# Patient Record
Sex: FEMALE | Race: WHITE | NOT HISPANIC OR LATINO | Employment: FULL TIME | URBAN - METROPOLITAN AREA
[De-identification: names, ages, dates, MRNs, and addresses within clinical notes are randomized per-mention and may not be internally consistent; named-entity substitution may affect disease eponyms.]

---

## 2017-08-02 ENCOUNTER — ALLSCRIPTS OFFICE VISIT (OUTPATIENT)
Dept: OTHER | Facility: OTHER | Age: 32
End: 2017-08-02

## 2017-08-02 DIAGNOSIS — S93.402A SPRAIN OF LIGAMENT OF LEFT ANKLE: ICD-10-CM

## 2018-01-13 VITALS
DIASTOLIC BLOOD PRESSURE: 76 MMHG | WEIGHT: 269.8 LBS | SYSTOLIC BLOOD PRESSURE: 118 MMHG | HEART RATE: 82 BPM | HEIGHT: 63 IN | BODY MASS INDEX: 47.8 KG/M2

## 2018-07-02 ENCOUNTER — OFFICE VISIT (OUTPATIENT)
Dept: FAMILY MEDICINE CLINIC | Facility: CLINIC | Age: 33
End: 2018-07-02

## 2018-07-02 VITALS
BODY MASS INDEX: 45.53 KG/M2 | WEIGHT: 257 LBS | DIASTOLIC BLOOD PRESSURE: 78 MMHG | HEART RATE: 68 BPM | TEMPERATURE: 98.9 F | SYSTOLIC BLOOD PRESSURE: 132 MMHG

## 2018-07-02 DIAGNOSIS — J06.9 UPPER RESPIRATORY TRACT INFECTION, UNSPECIFIED TYPE: Primary | ICD-10-CM

## 2018-07-02 DIAGNOSIS — J45.41 MODERATE PERSISTENT REACTIVE AIRWAY DISEASE WITH ACUTE EXACERBATION: ICD-10-CM

## 2018-07-02 PROBLEM — J45.901 REACTIVE AIRWAY DISEASE WITH ACUTE EXACERBATION: Status: ACTIVE | Noted: 2018-07-02

## 2018-07-02 PROBLEM — E66.01 MORBID OBESITY (HCC): Status: ACTIVE | Noted: 2017-08-02

## 2018-07-02 PROCEDURE — 94640 AIRWAY INHALATION TREATMENT: CPT | Performed by: PHYSICIAN ASSISTANT

## 2018-07-02 PROCEDURE — 99212 OFFICE O/P EST SF 10 MIN: CPT | Performed by: PHYSICIAN ASSISTANT

## 2018-07-02 RX ORDER — PREDNISONE 10 MG/1
TABLET ORAL
Qty: 25 TABLET | Refills: 0 | Status: SHIPPED | OUTPATIENT
Start: 2018-07-02 | End: 2020-03-03 | Stop reason: ALTCHOICE

## 2018-07-02 RX ORDER — AZITHROMYCIN 250 MG/1
TABLET, FILM COATED ORAL
Qty: 6 TABLET | Refills: 0 | Status: SHIPPED | OUTPATIENT
Start: 2018-07-02 | End: 2018-07-06

## 2018-07-02 RX ORDER — ALBUTEROL SULFATE 2.5 MG/3ML
2.5 SOLUTION RESPIRATORY (INHALATION) ONCE
Status: COMPLETED | OUTPATIENT
Start: 2018-07-02 | End: 2018-07-02

## 2018-07-02 RX ORDER — BENZONATATE 200 MG/1
200 CAPSULE ORAL 3 TIMES DAILY PRN
Qty: 30 CAPSULE | Refills: 0 | Status: SHIPPED | OUTPATIENT
Start: 2018-07-02 | End: 2018-07-12

## 2018-07-02 RX ADMIN — ALBUTEROL SULFATE 2.5 MG: 2.5 SOLUTION RESPIRATORY (INHALATION) at 10:11

## 2018-07-02 NOTE — PATIENT INSTRUCTIONS
Problem List Items Addressed This Visit        Respiratory    Upper respiratory infection - Primary     Antibiotic as directed  Suggest Mucinex DM generic as directed  Relevant Medications    azithromycin (ZITHROMAX) 250 mg tablet    benzonatate (TESSALON) 200 MG capsule    Other Relevant Orders    Mini neb    Reactive airway disease with acute exacerbation     Prednisone taper as directed  Nebulizer given the office today           Relevant Medications    predniSONE 10 mg tablet    albuterol inhalation solution 2 5 mg (Start on 7/2/2018 10:15 AM)    Other Relevant Orders    Mini neb

## 2018-07-02 NOTE — PROGRESS NOTES
Assessment/Plan:    Reactive airway disease with acute exacerbation  Prednisone taper as directed  Nebulizer given the office today  Upper respiratory infection  Antibiotic as directed  Suggest Mucinex DM generic as directed  Diagnoses and all orders for this visit:    Upper respiratory tract infection, unspecified type  -     Mini neb  -     azithromycin (ZITHROMAX) 250 mg tablet; Take two tablets on day one and then one tablet daily for the next four days  -     benzonatate (TESSALON) 200 MG capsule; Take 1 capsule (200 mg total) by mouth 3 (three) times a day as needed for cough for up to 10 days    Moderate persistent reactive airway disease with acute exacerbation  -     Mini neb  -     predniSONE 10 mg tablet; Day 1= 50 mg at once, date 2+3= 40 mg once daily, day 4+5= 30 mg once daily, day 6+7= 20 mg once daily, and day 8+9= 10 mg once daily  -     albuterol inhalation solution 2 5 mg; Take 3 mL (2 5 mg total) by nebulization once           Subjective:   CC: c/o cough and wheezing, pt  States 2132 wks ago she started with cold sx which have resolved but the cough remains  [facility]   Patient ID: Ami Cortez is a 28 y o  female  Patient states that over 1 week ago she developed head in throat congestion sinus pressure runny nose ear discomfort postnasal drip and that turned in to coughing with chest involvement  She states that she is not able to work secondary to incessant coughing despite using over-the-counter cough medications without relief  No one else that she knows is sick  No nausea vomiting or diarrhea she has no history of asthma  The following portions of the patient's history were reviewed and updated as appropriate: allergies, current medications, past family history, past medical history, past social history, past surgical history and problem list     Review of Systems   Respiratory: Positive for cough and wheezing               Mini neb     Date/Time 7/2/2018 10:06 AM Performed by  Mary Carmen Tran by Mata Jimenez       Consent: Verbal consent obtained  Risks and benefits: risks, benefits and alternatives were discussed     Procedure Details   Procedure Notes: Nebulizer used to deliver episode ordered medication/s  Patient did well, and was clinically reviewed after the nebulizer treatment was completed  Patient tolerance: Patient tolerated the procedure well with no immediate complications             Objective:      Vitals:    07/02/18 0949   BP: 132/78   BP Location: Left arm   Patient Position: Sitting   Pulse: 68   Temp: 98 9 °F (37 2 °C)   TempSrc: Tympanic   Weight: 117 kg (257 lb)            Physical Exam   Constitutional: She is oriented to person, place, and time  She appears well-developed and well-nourished  No distress  HENT:   Head: Normocephalic and atraumatic  Right Ear: External ear normal    Left Ear: External ear normal    Nose: Nose normal    Mouth/Throat: Oropharynx is clear and moist  No oropharyngeal exudate  Eyes: Conjunctivae are normal  Right eye exhibits no discharge  Left eye exhibits no discharge  Neck: Neck supple  Carotid bruit is not present  Cardiovascular: Normal rate, regular rhythm and normal heart sounds  Exam reveals no gallop and no friction rub  No murmur heard  Pulmonary/Chest: Effort normal and breath sounds normal  No respiratory distress  She has no wheezes  She has no rales  Difficult to assess breathing due to patient's incessant nonproductive cough  Breathing in word without inspiratory wheezes  Patient given nebulizer  S/p neb outward breath sounds rough bu no hilario wheezing  Neurological: She is alert and oriented to person, place, and time  Skin: Skin is warm and dry  She is not diaphoretic  Psychiatric: She has a normal mood and affect  Judgment normal    Nursing note and vitals reviewed

## 2018-07-02 NOTE — LETTER
July 2, 2018     Patient: Pedro Robbins   YOB: 1985   Date of Visit: 7/2/2018       To Whom it May Concern:    Pedro Robbins is under my professional care  She was seen in my office on 7/2/2018  She may return to work on July 5th, acute upper respiratory infection with reactive airway       If you have any questions or concerns, please don't hesitate to call           Sincerely,          Pat Antonio PA-C        CC: No Recipients

## 2018-07-05 ENCOUNTER — TELEPHONE (OUTPATIENT)
Dept: FAMILY MEDICINE CLINIC | Facility: CLINIC | Age: 33
End: 2018-07-05

## 2018-07-05 NOTE — TELEPHONE ENCOUNTER
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   PLEASE ADVISE

## 2020-03-03 ENCOUNTER — OFFICE VISIT (OUTPATIENT)
Dept: FAMILY MEDICINE CLINIC | Facility: CLINIC | Age: 35
End: 2020-03-03

## 2020-03-03 VITALS
HEIGHT: 63 IN | BODY MASS INDEX: 47.8 KG/M2 | DIASTOLIC BLOOD PRESSURE: 72 MMHG | HEART RATE: 88 BPM | SYSTOLIC BLOOD PRESSURE: 114 MMHG | WEIGHT: 269.8 LBS

## 2020-03-03 DIAGNOSIS — S09.90XA INJURY OF HEAD, INITIAL ENCOUNTER: Primary | ICD-10-CM

## 2020-03-03 PROCEDURE — 1036F TOBACCO NON-USER: CPT | Performed by: FAMILY MEDICINE

## 2020-03-03 PROCEDURE — 99213 OFFICE O/P EST LOW 20 MIN: CPT | Performed by: FAMILY MEDICINE

## 2020-03-03 PROCEDURE — 3008F BODY MASS INDEX DOCD: CPT | Performed by: FAMILY MEDICINE

## 2020-03-03 NOTE — ASSESSMENT & PLAN NOTE
Minor head injury with some photophobia, i e  Bright lights bother her  Consider follow-up with Neurology, therapy  For the time being, try to limit screen, i e  Turned down the brightness  Try to be in a dark room as far as vision with fluorescent lighting  If something bothers her more as she is working, she should stop  Should be reasonable for her to return to work at this point  If her workplace is not able to accommodate lighting changes, or decreasing the brightness on her screen, and alternate would be darker glasses

## 2020-03-03 NOTE — PATIENT INSTRUCTIONS
Problem List Items Addressed This Visit     Head injury - Primary     Minor head injury with some photophobia, i e  Bright lights bother her  Consider follow-up with Neurology, therapy  For the time being, try to limit screen, i e  Turned down the brightness  Try to be in a dark room as far as vision with fluorescent lighting  If something bothers her more as she is working, she should stop  Should be reasonable for her to return to work at this point  If her workplace is not able to accommodate lighting changes, or decreasing the brightness on her screen, and alternate would be darker glasses

## 2020-03-03 NOTE — LETTER
March 3, 2020     Patient: Nichole Her   YOB: 1985   Date of Visit: 3/3/2020       To Whom it May Concern:    Nichole Her is under my professional care  She was seen in my office on 3/3/2020  She may return to work with limitations 4 March 2020  Limitations include decreasing brightness ever screen, and decreasing brightness of lighting in her office area  If that cannot be accomplished, and alternate of allowing her to wear dark glasses would be reasonable       If you have any questions or concerns, please don't hesitate to call           Sincerely,          Teddy Fisher MD        CC: No Recipients

## 2020-03-03 NOTE — PROGRESS NOTES
Assessment and Plan:    Problem List Items Addressed This Visit     Head injury - Primary     Minor head injury with some photophobia, i e  Bright lights bother her  Consider follow-up with Neurology, therapy  For the time being, try to limit screen, i e  Turned down the brightness  Try to be in a dark room as far as vision with fluorescent lighting  If something bothers her more as she is working, she should stop  Should be reasonable for her to return to work at this point  If her workplace is not able to accommodate lighting changes, or decreasing the brightness on her screen, and alternate would be darker glasses  Diagnoses and all orders for this visit:    Injury of head, initial encounter              Subjective:      Patient ID: Kristin Faulkner is a 38 y o  female  CC:    Chief Complaint   Patient presents with    Back Pain     Pt states she hit her head on Saturday getting out of the car and since she has had headaches, slight dizziness and low back pain from the hit  kw    Headache    Dizziness       HPI:    Please see chief complaint  Patient was exiting a vehicle, and unfortunately hit her head  Patient currently has some irritation across the frontal aspect of the head, vision changes including light sensitivity, up and some low back pain  Some pain initially on the right side of the C-spine  No LOC  Remembers the entire event  No other neurologic loss at the moment  Used ibuprofen, heating pad  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, which means that she is working on computer in office with [employer]  The following portions of the patient's history were reviewed and updated as appropriate: allergies, current medications and problem list       Review of Systems   Constitutional: Negative  HENT: Negative  Eyes: Positive for photophobia and visual disturbance  Respiratory: Negative  Cardiovascular: Negative  Gastrointestinal: Negative  Endocrine: Negative  Genitourinary: Negative  Musculoskeletal: Negative  Skin: Negative  Allergic/Immunologic: Negative  Neurological:        Some changes in vision, i e  Light sensitivity, some problems tracking  Hematological: Negative  Psychiatric/Behavioral: Negative  Data to review:       Objective:    Vitals:    03/03/20 1455   BP: 114/72   BP Location: Left arm   Patient Position: Sitting   Pulse: 88   Weight: 122 kg (269 lb 12 8 oz)   Height: 5' 3" (1 6 m)        Physical Exam   Constitutional: She is oriented to person, place, and time  She appears well-developed and well-nourished  HENT:   Head: Normocephalic and atraumatic  Cardiovascular: Normal rate, regular rhythm and normal heart sounds  Pulses:       Carotid pulses are 2+ on the right side, and 2+ on the left side  Pulmonary/Chest: Effort normal and breath sounds normal  She has no wheezes  She has no rales  She exhibits no tenderness  Neurological: She is alert and oriented to person, place, and time  She displays normal reflexes  No cranial nerve deficit or sensory deficit  She exhibits normal muscle tone  Coordination normal    Some slight difficulty with tracking ocular motion, but otherwise normal    Nursing note and vitals reviewed  BMI Counseling: Body mass index is 47 79 kg/m²  The BMI is above normal  Nutrition recommendations include decreasing portion sizes, encouraging healthy choices of fruits and vegetables, decreasing fast food intake, consuming healthier snacks, limiting drinks that contain sugar, moderation in carbohydrate intake, increasing intake of lean protein, reducing intake of saturated and trans fat and reducing intake of cholesterol  Exercise recommendations include exercising 3-5 times per week  No pharmacotherapy was ordered

## 2020-03-04 ENCOUNTER — APPOINTMENT (EMERGENCY)
Dept: CT IMAGING | Facility: HOSPITAL | Age: 35
End: 2020-03-04
Payer: COMMERCIAL

## 2020-03-04 ENCOUNTER — HOSPITAL ENCOUNTER (EMERGENCY)
Facility: HOSPITAL | Age: 35
Discharge: HOME | End: 2020-03-04
Attending: EMERGENCY MEDICINE | Admitting: EMERGENCY MEDICINE
Payer: COMMERCIAL

## 2020-03-04 VITALS
RESPIRATION RATE: 16 BRPM | HEART RATE: 80 BPM | SYSTOLIC BLOOD PRESSURE: 124 MMHG | OXYGEN SATURATION: 99 % | WEIGHT: 271.61 LBS | TEMPERATURE: 97.8 F | DIASTOLIC BLOOD PRESSURE: 60 MMHG | BODY MASS INDEX: 48.11 KG/M2

## 2020-03-04 DIAGNOSIS — S06.0X9A CONCUSSION: Primary | ICD-10-CM

## 2020-03-04 PROCEDURE — NC001 PR NO CHARGE: Performed by: EMERGENCY MEDICINE

## 2020-03-04 PROCEDURE — 99284 EMERGENCY DEPT VISIT MOD MDM: CPT

## 2020-03-04 PROCEDURE — 99284 EMERGENCY DEPT VISIT MOD MDM: CPT | Performed by: EMERGENCY MEDICINE

## 2020-03-04 PROCEDURE — 70450 CT HEAD/BRAIN W/O DYE: CPT

## 2020-03-04 NOTE — ED PROVIDER NOTES
History  Chief Complaint   Patient presents with    Dizziness     pt states  on saturday when she tried to get out of vehicle hit head on door  pt states she saw pcp for clearance for work yesterday needed ct scan but unable to get  pt states this am pt states she woke up dizzy and weak  attempted to see pcp but fell did not hit head  c/o pressure to head       35y  o female with no significant PMH presents to the ER for evaluation  Patient states she hit her head on her car on Saturday  She was seen by her pcp and was cleared to return to work  Today, while getting out of the car, she became dizzy and fell over  She denies hitting her head again or LOC  She did hit her right knee but states it does not hurt  This fall occurred around 09:55 today  She denies starting or stopping any medications  She describes her dizziness as room spinning that comes and goes with quick movements  Associated symptoms: photophobia  She denies fever, chills, headache, vision changes, chest pain, dyspnea, N/V/D, abdominal pain, weakness or paresthesias  History provided by:  Patient   used: No        None       History reviewed  No pertinent past medical history  Past Surgical History:   Procedure Laterality Date    CHOLECYSTECTOMY         History reviewed  No pertinent family history  I have reviewed and agree with the history as documented  E-Cigarette/Vaping    E-Cigarette Use Never User      E-Cigarette/Vaping Substances     Social History     Tobacco Use    Smoking status: Never Smoker    Smokeless tobacco: Never Used   Substance Use Topics    Alcohol use: Never     Frequency: Never    Drug use: Never       Review of Systems   Constitutional: Negative for chills and fever  HENT: Negative for facial swelling  Eyes: Positive for photophobia  Negative for visual disturbance  Respiratory: Negative for shortness of breath  Cardiovascular: Negative for chest pain     Gastrointestinal: Negative for abdominal pain, diarrhea, nausea and vomiting  Musculoskeletal: Negative for back pain, neck pain and neck stiffness  Skin: Negative for rash  Allergic/Immunologic: Negative for food allergies  Neurological: Positive for dizziness  Negative for weakness, numbness and headaches  Physical Exam  Physical Exam   Constitutional:  Non-toxic appearance  No distress  HENT:   Head: Normocephalic and atraumatic  Right Ear: Tympanic membrane, external ear and ear canal normal  No drainage, swelling or tenderness  No foreign bodies  Tympanic membrane is not erythematous  No hemotympanum  Left Ear: Tympanic membrane, external ear and ear canal normal  No drainage, swelling or tenderness  No foreign bodies  Tympanic membrane is not erythematous  No hemotympanum  Nose: Nose normal    Mouth/Throat: Uvula is midline, oropharynx is clear and moist and mucous membranes are normal  No uvula swelling  No posterior oropharyngeal edema, posterior oropharyngeal erythema or tonsillar abscesses  No tonsillar exudate  Eyes: Pupils are equal, round, and reactive to light  Conjunctivae and EOM are normal    Neck: Normal range of motion and phonation normal  Neck supple  No tracheal deviation present  Cardiovascular: Normal rate, regular rhythm, S1 normal, S2 normal and normal heart sounds  Exam reveals no gallop and no friction rub  No murmur heard  Pulmonary/Chest: Effort normal and breath sounds normal  No respiratory distress  She has no decreased breath sounds  She has no wheezes  She has no rhonchi  She has no rales  She exhibits no tenderness  Abdominal: Soft  Bowel sounds are normal  She exhibits no distension  There is no tenderness  There is no rebound and no guarding  Musculoskeletal: Normal range of motion  She exhibits no edema or deformity          Right knee: Normal         Left knee: Normal         Cervical back: Normal         Thoracic back: Normal         Lumbar back: Normal  Neurological: She is alert  She has normal strength  No cranial nerve deficit or sensory deficit  She exhibits normal muscle tone  Gait normal  GCS eye subscore is 4  GCS verbal subscore is 5  GCS motor subscore is 6  Normal finger to nose  No facial asymmetry  Normal NORA  Normal gait  Skin: Skin is warm and dry  No rash noted  Psychiatric: She has a normal mood and affect  Nursing note and vitals reviewed  Vital Signs  ED Triage Vitals [03/04/20 1339]   Temperature Pulse Respirations Blood Pressure SpO2   97 8 °F (36 6 °C) 87 14 127/58 98 %      Temp Source Heart Rate Source Patient Position - Orthostatic VS BP Location FiO2 (%)   Temporal Monitor Sitting Right arm --      Pain Score       4           Vitals:    03/04/20 1339 03/04/20 1540   BP: 127/58 124/60   Pulse: 87 80   Patient Position - Orthostatic VS: Sitting          Visual Acuity  Visual Acuity      Most Recent Value   L Pupil Size (mm)  3   R Pupil Size (mm)  3          ED Medications  Medications - No data to display    Diagnostic Studies  Results Reviewed     None                 CT head without contrast   Final Result by Catia Riggins MD (03/04 1635)      No acute intracranial abnormality  Workstation performed: [id number]                    Procedures  Procedures         ED Course                               MDM  Number of Diagnoses or Management Options  Concussion: new and requires workup  Diagnosis management comments: DDX consists of but not limited to: concussion, intracranial abnormality    Will check pregnancy and CT head  Patient signed out to [facility] awaiting [facility]  Patient stable         Amount and/or Complexity of Data Reviewed  Clinical lab tests: ordered and reviewed  Tests in the radiology section of CPT®: ordered and reviewed  Discuss the patient with other providers: yes    Patient Progress  Patient progress: stable        Disposition  Final diagnoses:   Concussion     Time reflects when diagnosis was documented in both MDM as applicable and the Disposition within this note     Time User Action Codes Description Comment    3/4/2020  4:49 PM Rosalba Sethi Add [S06 0X9A] Concussion       ED Disposition     ED Disposition Condition Date/Time Comment    Discharge Stable Wed Mar 4, 2020  4:49 PM Marybeth Big discharge to home/self care  Follow-up Information     Follow up With Specialties Details Why [address]  Schedule an appointment as soon as possible for a visit   [place]  [phone]          There are no discharge medications for this patient  No discharge procedures on file      PDMP Review     None          ED Provider  Electronically Signed by           Kay Smith PA-C  03/05/20 1636

## 2020-03-04 NOTE — ED NOTES
Pt finished first cup of water  Pt attempted to provided a urine specimen and was unsuccessful  Per RN provided Pt with another cup of water        Lea Tinoco  83/42/36 6009

## 2020-03-04 NOTE — ED NOTES
Per provided please provide Pt with cup of water due to Pt being unable to provide a urine specimen        Telly Lima  99/08/57 1442

## 2020-03-04 NOTE — ED NOTES
Pt still unable to provide adequate urine sample for urine HCG test; Pt denies pregnancy and is requesting to "sign something" to be able to have CT of head without urine HCG results  Darya Gaspar PA-C notified and will permit CT to be done without urine HCG result; CT tech notified and is aware of situation; Pt notified that per her request CT of head will be preformed without urine HCG result;  Pt verbalized understanding      Zakiya Nye RN  03/04/20 5622

## 2020-03-04 NOTE — ED NOTES
Pt states she is unable to void at this time  Informed Pt that a specimen is ordered and informed Pt of where the restroom is        Naveen Grain  99/70/34 2857

## 2020-03-04 NOTE — DISCHARGE INSTRUCTIONS
Please refer to the attached information for strict return instructions  If symptoms worsen or new symptoms develop please return to the ER  Please follow up with sports medicine

## 2020-03-05 NOTE — ED PROVIDER NOTES
Emergency Department Sign Out Note        Sign out and transfer of care from [place], [place]  See Separate Emergency Department note  The patient, Tee Booth, was evaluated by the previous provider for head trauma, dizziness, photophobia after head strike on Saturday  Workup Completed:  CT head without contrast   Final Result      No acute intracranial abnormality  Workstation performed: [id number]               ED Course / Workup Pending (followup):                          ED Course as of Mar 04 2310   Wed Mar 04, 2020   1545 Pt declines pregnancy test, verbalizes understanding of risks of CT during pregnancy  Procedures  MDM  Number of Diagnoses or Management Options  Concussion:   Diagnosis management comments: Intermittent lightheadedness/dizziness, photophobia after head injury Saturday  CT head w/o contrast unremarkable  History/exam c/w concussion  Follow up with sports medicine recommended  Strict return indications discussed with pt and family  Amount and/or Complexity of Data Reviewed  Tests in the radiology section of CPT®: reviewed    Patient Progress  Patient progress: stable      Disposition  Final diagnoses:   Concussion     Time reflects when diagnosis was documented in both MDM as applicable and the Disposition within this note     Time User Action Codes Description Comment    3/4/2020  4:49 PM Travis Jaimes Add [S06 0X9A] Concussion       ED Disposition     ED Disposition Condition Date/Time Comment    Discharge Stable Wed Mar 4, 2020  4:49 PM Tee Booth discharge to home/self care  Follow-up Information     Follow up With Specialties Details Why [address]  Schedule an appointment as soon as possible for a visit   [place]  [phone]        There are no discharge medications for this patient  No discharge procedures on file         ED Provider  Electronically Signed by     Bella Santos Darrion Baptiste PA-C  03/04/20 9616

## 2020-03-09 ENCOUNTER — OFFICE VISIT (OUTPATIENT)
Dept: OBGYN CLINIC | Facility: OTHER | Age: 35
End: 2020-03-09

## 2020-03-09 VITALS
BODY MASS INDEX: 46.1 KG/M2 | HEART RATE: 101 BPM | HEIGHT: 64 IN | WEIGHT: 270 LBS | DIASTOLIC BLOOD PRESSURE: 85 MMHG | SYSTOLIC BLOOD PRESSURE: 126 MMHG

## 2020-03-09 DIAGNOSIS — S06.0X0A CONCUSSION WITHOUT LOSS OF CONSCIOUSNESS, INITIAL ENCOUNTER: Primary | ICD-10-CM

## 2020-03-09 PROCEDURE — 99204 OFFICE O/P NEW MOD 45 MIN: CPT | Performed by: ORTHOPAEDIC SURGERY

## 2020-03-09 PROCEDURE — 3008F BODY MASS INDEX DOCD: CPT | Performed by: ORTHOPAEDIC SURGERY

## 2020-03-09 PROCEDURE — 1036F TOBACCO NON-USER: CPT | Performed by: ORTHOPAEDIC SURGERY
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
## 2020-03-09 NOTE — LETTER
March 9, 2020     Patient: Yolanda Santo   YOB: 1985   Date of Visit: 3/9/2020       To Whom it May Concern:    Yolanda Santo is under my professional care  She was seen in my office on 3/9/2020  She may return to work with limitations   Fist no heavy physical exertion  Allow wearing anti glare glasses/shades at work  Please allow for 5-10 minutes break every 60 minutes as needed at work  No climbing ladders or working at a height       If you have any questions or concerns, please don't hesitate to call           Sincerely,          Miguel Medina MD        CC: Yolanda Barraganil

## 2020-03-11 ENCOUNTER — TELEPHONE (OUTPATIENT)
Dept: OBGYN CLINIC | Facility: HOSPITAL | Age: 35
End: 2020-03-11

## 2020-03-11 NOTE — TELEPHONE ENCOUNTER
Patient of Dr Maura Ashton  Re: Letter  # [phone]    Patient called asking for the letter written by Dr Maura Ashton to be adjusted  Patients employee will not let her return to work with "intermittent breaks"   Patient needs the following sentence removed from the letter     "  Please allow for 5-10 minutes break every 60 minutes as needed at work"    Patient asked that we fax it to:   Naomie Alvarez

## 2020-03-12 ENCOUNTER — TELEPHONE (OUTPATIENT)
Dept: OBGYN CLINIC | Facility: CLINIC | Age: 35
End: 2020-03-12

## 2020-03-12 ENCOUNTER — TELEPHONE (OUTPATIENT)
Dept: OBGYN CLINIC | Facility: HOSPITAL | Age: 35
End: 2020-03-12

## 2020-03-12 NOTE — TELEPHONE ENCOUNTER
Tray Rome [phone] called to follow up on the message about her employer not allowing her to take intermittent breaks  She has been out of work for two weeks and needs to get back to work for financial reasons  Please remove, "Please allow for 5-10 minutes break every 60 minutes as needed at work" and fax to:  [phone]    Thank you

## 2020-03-12 NOTE — TELEPHONE ENCOUNTER
Patient is calling to request the work status letter from today, to be mailed to her to her physical address      [address] [place] [address]

## 2020-03-12 NOTE — LETTER
March 12, 2020     Patient: Onelia Reyes   YOB: 1985   Date of Visit: 03/09/2020       To Whom it May Concern:    Onelia Reyes is under my professional care  She was seen in my office on 03/09/2020  She may return back to work but suggest avoiding heavy physical exertion activity, climbing ladders or working at a height  If you have any questions or concerns, please don't hesitate to call           Sincerely,          Curt Gil MD        CC: No Recipients

## 2020-03-12 NOTE — TELEPHONE ENCOUNTER
Patient called stating the fax # she provided isnt working  She is going to callback with a new one

## 2020-03-13 ENCOUNTER — TELEPHONE (OUTPATIENT)
Dept: OBGYN CLINIC | Facility: OTHER | Age: 35
End: 2020-03-13

## 2020-03-18 ENCOUNTER — EVALUATION (OUTPATIENT)
Dept: PHYSICAL THERAPY | Facility: REHABILITATION | Age: 35
End: 2020-03-18
Payer: COMMERCIAL

## 2020-03-18 DIAGNOSIS — S06.0X0D CONCUSSION WITHOUT LOSS OF CONSCIOUSNESS, SUBSEQUENT ENCOUNTER: ICD-10-CM

## 2020-03-18 PROCEDURE — 97162 PT EVAL MOD COMPLEX 30 MIN: CPT
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
## 2020-03-18 NOTE — TELEPHONE ENCOUNTER
Contacted patient since I have not heard back in regards to a new fax number  She stated she did call back and that who she spoke to said they would mail the letter out

## 2020-03-20 ENCOUNTER — TELEPHONE (OUTPATIENT)
Dept: OBGYN CLINIC | Facility: OTHER | Age: 35
End: 2020-03-20

## 2020-03-20 NOTE — TELEPHONE ENCOUNTER
Left message for patient regarding appointment with Dr Gerhardt Last for Monday, 3/23  Dr Gerhardt Last would like to offer patient to be seen via virtual visit

## 2020-03-25 ENCOUNTER — APPOINTMENT (OUTPATIENT)
Dept: PHYSICAL THERAPY | Facility: REHABILITATION | Age: 35
End: 2020-03-25
Payer: COMMERCIAL

## 2020-04-03 ENCOUNTER — APPOINTMENT (OUTPATIENT)
Dept: PHYSICAL THERAPY | Facility: REHABILITATION | Age: 35
End: 2020-04-03
Payer: COMMERCIAL

## 2020-04-08 ENCOUNTER — OFFICE VISIT (OUTPATIENT)
Dept: PHYSICAL THERAPY | Facility: REHABILITATION | Age: 35
End: 2020-04-08
Payer: COMMERCIAL

## 2020-04-08 DIAGNOSIS — S06.0X0D CONCUSSION WITHOUT LOSS OF CONSCIOUSNESS, SUBSEQUENT ENCOUNTER: Primary | ICD-10-CM

## 2020-04-08 PROCEDURE — 97112 NEUROMUSCULAR REEDUCATION: CPT

## 2020-04-08 PROCEDURE — 97110 THERAPEUTIC EXERCISES: CPT

## 2020-04-15 ENCOUNTER — APPOINTMENT (OUTPATIENT)
Dept: PHYSICAL THERAPY | Facility: REHABILITATION | Age: 35
End: 2020-04-15
Payer: COMMERCIAL

## 2020-04-22 ENCOUNTER — APPOINTMENT (OUTPATIENT)
Dept: PHYSICAL THERAPY | Facility: REHABILITATION | Age: 35
End: 2020-04-22
Payer: COMMERCIAL

## 2020-04-29 ENCOUNTER — EVALUATION (OUTPATIENT)
Dept: PHYSICAL THERAPY | Facility: REHABILITATION | Age: 35
End: 2020-04-29
Payer: COMMERCIAL

## 2020-04-29 DIAGNOSIS — S06.0X0D CONCUSSION WITHOUT LOSS OF CONSCIOUSNESS, SUBSEQUENT ENCOUNTER: Primary | ICD-10-CM

## 2020-04-29 PROCEDURE — 97110 THERAPEUTIC EXERCISES: CPT

## 2020-04-29 PROCEDURE — 97112 NEUROMUSCULAR REEDUCATION: CPT

## 2021-06-30 ENCOUNTER — APPOINTMENT (EMERGENCY)
Dept: NON INVASIVE DIAGNOSTICS | Facility: HOSPITAL | Age: 36
End: 2021-06-30
Payer: COMMERCIAL

## 2021-06-30 ENCOUNTER — HOSPITAL ENCOUNTER (EMERGENCY)
Facility: HOSPITAL | Age: 36
Discharge: HOME | End: 2021-06-30
Attending: EMERGENCY MEDICINE
Payer: COMMERCIAL

## 2021-06-30 VITALS
DIASTOLIC BLOOD PRESSURE: 86 MMHG | SYSTOLIC BLOOD PRESSURE: 142 MMHG | OXYGEN SATURATION: 98 % | WEIGHT: 264.77 LBS | RESPIRATION RATE: 18 BRPM | HEART RATE: 97 BPM | TEMPERATURE: 98.8 F | BODY MASS INDEX: 45.81 KG/M2

## 2021-06-30 DIAGNOSIS — R60.0 EDEMA OF LEFT LOWER EXTREMITY: Primary | ICD-10-CM

## 2021-06-30 PROCEDURE — 93971 EXTREMITY STUDY: CPT

## 2021-06-30 PROCEDURE — 99284 EMERGENCY DEPT VISIT MOD MDM: CPT | Performed by: PHYSICIAN ASSISTANT

## 2021-06-30 PROCEDURE — 99283 EMERGENCY DEPT VISIT LOW MDM: CPT

## 2021-06-30 RX ORDER — NAPROXEN 500 MG/1
500 TABLET ORAL 2 TIMES DAILY PRN
Qty: 30 TABLET | Refills: 0 | Status: SHIPPED | OUTPATIENT
Start: 2021-06-30 | End: 2021-07-12

## 2021-07-01 PROCEDURE — 93971 EXTREMITY STUDY: CPT | Performed by: SURGERY

## 2021-07-01 NOTE — ED PROVIDER NOTES
History  Chief Complaint   Patient presents with    Leg Swelling     Pt reports on and off swelling in lower left leg for 5 days, pain in upper left thigh  Sent by PCP for concern of DVT  Pt reports swelling increases as day goes and and when leg is not elevated  Sandor Cuevas is a 38 yo F presenting with waxing/waning left lower extremity swelling and pain over the past 5 days  She reports swelling to lower leg which seems to worsen with standing and improve with elevation and overnight  She also notes cramping pain along posterior calf as well as radiation of pain into posterior upper leg  She reports her swelling is currently improved, but it had been significantly worse earlier today  No recent trauma or surgery  No prolonged immobilization or recent travel  No history of DVT or PE  No family history of blood clots or coagulation disorder  No exogenous estrogen/OCP use  Patient reports she was referred to ED for DVT exclusion  History provided by:  Patient   used: No    Leg Pain  Location:  Leg  Time since incident:  5 days  Injury: no    Leg location:  L lower leg  Pain details:     Quality:  Aching and cramping    Onset quality:  Gradual    Timing:  Constant    Progression:  Waxing and waning  Chronicity:  New  Relieved by:  Elevation and rest  Exacerbated by: Standing  Associated symptoms: swelling    Associated symptoms: no back pain, no fever, no muscle weakness, no neck pain, no numbness and no tingling        None       History reviewed  No pertinent past medical history  Past Surgical History:   Procedure Laterality Date    CHOLECYSTECTOMY         History reviewed  No pertinent family history  I have reviewed and agree with the history as documented      E-Cigarette/Vaping    E-Cigarette Use Never User      E-Cigarette/Vaping Substances     Social History     Tobacco Use    Smoking status: Never Smoker    Smokeless tobacco: Never Used   Vaping Use    Vaping Use: Never used   Substance Use Topics    Alcohol use: Never    Drug use: Never       Review of Systems   Constitutional: Negative for chills and fever  HENT: Negative for congestion, rhinorrhea and sore throat  Eyes: Negative for pain and visual disturbance  Respiratory: Negative for cough, shortness of breath and wheezing  Cardiovascular: Positive for leg swelling  Negative for chest pain and palpitations  Gastrointestinal: Negative for abdominal pain, nausea and vomiting  Genitourinary: Negative for dysuria, frequency and urgency  Musculoskeletal: Negative for back pain, neck pain and neck stiffness  Skin: Negative for rash and wound  Neurological: Negative for dizziness, weakness, light-headedness and numbness  Physical Exam  Physical Exam  Constitutional:       General: She is not in acute distress  Appearance: She is well-developed  She is not diaphoretic  HENT:      Head: Normocephalic and atraumatic  Right Ear: External ear normal       Left Ear: External ear normal    Eyes:      Conjunctiva/sclera: Conjunctivae normal       Pupils: Pupils are equal, round, and reactive to light  Cardiovascular:      Rate and Rhythm: Normal rate and regular rhythm  Heart sounds: Normal heart sounds  No murmur heard  No friction rub  No gallop  Pulmonary:      Effort: Pulmonary effort is normal  No respiratory distress  Breath sounds: Normal breath sounds  No wheezing  Abdominal:      General: There is no distension  Palpations: Abdomen is soft  Tenderness: There is no abdominal tenderness  Musculoskeletal:         General: Tenderness present  Cervical back: Normal range of motion and neck supple  Comments: Mild TTP along posterior L calf as well as posterior L upper leg  Trace non-pitting edema noted L>R  2+ dorsalis pedis, posterior tibial pulses b/l with brisk cap refill  No decreased ROM or deformity to lower extremities      Lymphadenopathy: Cervical: No cervical adenopathy  Skin:     General: Skin is warm and dry  Capillary Refill: Capillary refill takes less than 2 seconds  Findings: No erythema or rash  Neurological:      Mental Status: She is alert and oriented to person, place, and time  Motor: No abnormal muscle tone  Coordination: Coordination normal    Psychiatric:         Behavior: Behavior normal          Thought Content: Thought content normal          Judgment: Judgment normal          Vital Signs  ED Triage Vitals [06/30/21 2037]   Temperature Pulse Respirations Blood Pressure SpO2   98 8 °F (37 1 °C) 97 18 142/86 98 %      Temp Source Heart Rate Source Patient Position - Orthostatic VS BP Location FiO2 (%)   Oral Monitor Lying Right arm --      Pain Score       3           Vitals:    06/30/21 2037   BP: 142/86   Pulse: 97   Patient Position - Orthostatic VS: Lying         Visual Acuity      ED Medications  Medications - No data to display    Diagnostic Studies  Results Reviewed     None                 VAS lower limb venous duplex study, unilateral/limited    (Results Pending)              Procedures  Procedures         ED Course                             SBIRT 22yo+      Most Recent Value   SBIRT (22 yo +)   In order to provide better care to our patients, we are screening all of our patients for alcohol and drug use  Would it be okay to ask you these screening questions? No Filed at: 06/30/2021 2039                    MDM  Number of Diagnoses or Management Options  Edema of left lower extremity  Diagnosis management comments: Atraumatic left lower leg pain with occasional radiation into posterior L calf  Waxing/waning swelling worse with standing for long periods and improved with rest/elevation  Mild TTP along posterior calf/thigh with minimal L>R lower extremity edema  Will order LE duplex US for DVT exclusion   Pending negative result, supportive treatment with NSAIDs for msk pain, PCP follow up for re-evaluation  Amount and/or Complexity of Data Reviewed  Tests in the radiology section of CPT®: ordered    Patient Progress  Patient progress: stable      Disposition  Final diagnoses:   Edema of left lower extremity     Time reflects when diagnosis was documented in both MDM as applicable and the Disposition within this note     Time User Action Codes Description Comment    6/30/2021 10:27 PM Jones Saldana Add [R60 0] Edema of left lower extremity       ED Disposition     ED Disposition Condition Date/Time Comment    Discharge Stable Wed Jun 30, 2021 10:27 PM Arland Opitz discharge to home/self care  Follow-up Information     Follow up With Specialties Details Why Contact Info Additional Information    Gem Fuentes MD Family Medicine Schedule an appointment as soon as possible for a visit   [address] [place] [phone] [id number] [facility] Emergency Department Emergency Medicine  If symptoms worsen [employer] [id number]  [address] [place] [address] Emergency Department, [address] [place] [address], [phone]          Discharge Medication List as of 6/30/2021 10:36 PM      START taking these medications    Details   naproxen (NAPROSYN) 500 mg tablet Take 1 tablet (500 mg total) by mouth 2 (two) times a day as needed for mild pain or moderate pain, Starting Wed 6/30/2021, Normal           No discharge procedures on file      PDMP Review     None          ED Provider  Electronically Signed by           Conner Olea PA-C  07/01/21 0107

## 2021-07-12 ENCOUNTER — OFFICE VISIT (OUTPATIENT)
Dept: FAMILY MEDICINE CLINIC | Facility: CLINIC | Age: 36
End: 2021-07-12

## 2021-07-12 VITALS
HEIGHT: 63 IN | DIASTOLIC BLOOD PRESSURE: 82 MMHG | RESPIRATION RATE: 18 BRPM | SYSTOLIC BLOOD PRESSURE: 130 MMHG | WEIGHT: 258 LBS | BODY MASS INDEX: 45.71 KG/M2 | HEART RATE: 80 BPM

## 2021-07-12 DIAGNOSIS — Z13.29 SCREENING FOR THYROID DISORDER: ICD-10-CM

## 2021-07-12 DIAGNOSIS — E66.01 MORBID OBESITY (HCC): ICD-10-CM

## 2021-07-12 DIAGNOSIS — Z13.1 SCREENING FOR DIABETES MELLITUS: ICD-10-CM

## 2021-07-12 DIAGNOSIS — R60.0 EDEMA OF LEFT LOWER EXTREMITY: Primary | ICD-10-CM

## 2021-07-12 DIAGNOSIS — S09.90XD INJURY OF HEAD, SUBSEQUENT ENCOUNTER: ICD-10-CM

## 2021-07-12 DIAGNOSIS — Z13.220 SCREENING FOR LIPID DISORDERS: ICD-10-CM

## 2021-07-12 DIAGNOSIS — Z13.0 SCREENING FOR DEFICIENCY ANEMIA: ICD-10-CM

## 2021-07-12 PROCEDURE — 99214 OFFICE O/P EST MOD 30 MIN: CPT | Performed by: NURSE PRACTITIONER

## 2021-07-12 NOTE — PROGRESS NOTES
Assessment and Plan:    Problem List Items Addressed This Visit        Other    Morbid obesity ([facility] [address] )    Head injury     This appears to be resolved at this point  RESOLVED: Edema of left lower extremity - Primary     This appears to be resolved at this point  Patient reports no complications related to this  Other Visit Diagnoses     Screening for deficiency anemia        Relevant Orders    CBC and Platelet    Screening for diabetes mellitus        Relevant Orders    Comprehensive metabolic panel    UA w Reflex to Microscopic w Reflex to Culture -Lab Collect    Screening for lipid disorders        Relevant Orders    Lipid Panel with Direct LDL reflex    Screening for thyroid disorder        Relevant Orders    TSH, 3rd generation                 Diagnoses and all orders for this visit:    Edema of left lower extremity    Screening for deficiency anemia  -     CBC and Platelet; Future    Screening for diabetes mellitus  -     Comprehensive metabolic panel; Future  -     UA w Reflex to Microscopic w Reflex to Culture -Lab Collect    Screening for lipid disorders  -     Lipid Panel with Direct LDL reflex; Future    Screening for thyroid disorder  -     TSH, 3rd generation; Future    Morbid obesity ([facility] [address] )    Injury of head, subsequent encounter              Subjective:      Patient ID: Jessika Irving is a 39 y o  female  CC:    Chief Complaint   Patient presents with    Follow-up     ER visit, pain and swelling in left leg       HPI:    Patient is here for follow-up after ED visit on 06/30/2021 after having pain in her left thigh area and swelling in her left calf area for a few days  Duplex study was negative for DVT of the left lower extremity  Patient was started on naproxen as needed for pain and discharged  The patient reports that since leaving the ED she has been having no pain in the area  She denies any SOB, chest pain, or orthopnea  She reports the swelling in calf area has improved   She has not been using the Naproxen as she has not needed it  She reports prior to the episode of pain in her left lower extremity she stated she was feeling down and somewhat depressed for about a week and she states she was not doing much physical activity  Therefore, she feels when she began to move around again she may have strained a muscle in her leg  However, she now feels like this has resolved  Concussion:  She was diagnosed with this after hitting her head in March 2020  The patient was seen by Physical therapy for 2 weeks and was then discharged  She denies any current issues regarding this other than occasional photophobia  She states she will use the exercises PT taught her to relieve her symptoms  Morbid obesity:  Spoke with patient about dietary and exercise modifications that she can use to promote weight loss  The following portions of the patient's history were reviewed and updated as appropriate: allergies, current medications, past family history, past medical history, past social history, past surgical history and problem list       Review of Systems   Constitutional: Negative for chills and fever  HENT: Negative for ear pain and sore throat  Eyes: Positive for visual disturbance (Intermittent photophobia)  Negative for pain  Respiratory: Negative for cough, chest tightness, shortness of breath and wheezing  Cardiovascular: Negative for chest pain, palpitations and leg swelling  Gastrointestinal: Negative for abdominal pain, constipation, diarrhea, nausea and vomiting  Endocrine: Negative for cold intolerance and heat intolerance  Genitourinary: Negative for decreased urine volume, dysuria and hematuria  Musculoskeletal: Negative for arthralgias, back pain and myalgias  Skin: Negative for color change and rash  Allergic/Immunologic: Negative for environmental allergies     Neurological: Negative for dizziness, seizures, syncope, weakness, light-headedness, numbness and headaches  Hematological: Negative for adenopathy  Psychiatric/Behavioral: Negative for confusion  The patient is not nervous/anxious  All other systems reviewed and are negative  Data to review:       Objective:    Vitals:    07/12/21 1540   BP: 130/82   BP Location: Right arm   Patient Position: Sitting   Cuff Size: Adult   Pulse: 80   Resp: 18   Weight: 117 kg (258 lb)   Height: 5' 3" (1 6 m)        Physical Exam  Vitals and nursing note reviewed  Constitutional:       General: She is not in acute distress  Appearance: Normal appearance  She is well-developed  She is not ill-appearing  HENT:      Head: Normocephalic and atraumatic  Eyes:      Extraocular Movements: Extraocular movements intact  Right eye: Normal extraocular motion and no nystagmus  Left eye: Normal extraocular motion and no nystagmus  Conjunctiva/sclera: Conjunctivae normal    Cardiovascular:      Rate and Rhythm: Normal rate and regular rhythm  Pulses: Normal pulses  Carotid pulses are 2+ on the right side and 2+ on the left side  Posterior tibial pulses are 2+ on the right side and 2+ on the left side  Heart sounds: Normal heart sounds  No murmur heard  Comments: No edema noted in left lower extremity  Homans sign negative and left lower extremity  Pulmonary:      Effort: Pulmonary effort is normal  No respiratory distress  Breath sounds: Normal breath sounds  No wheezing or rhonchi  Abdominal:      General: Abdomen is flat  Bowel sounds are normal  There is no distension  Palpations: Abdomen is soft  Tenderness: There is no abdominal tenderness  There is no guarding  Musculoskeletal:         General: Normal range of motion  Cervical back: Normal range of motion and neck supple  Lumbar back: No spasms, tenderness or bony tenderness   Negative right straight leg raise test and negative left straight leg raise test       Right lower leg: No edema  Left lower leg: No swelling, tenderness or bony tenderness  No edema  Skin:     General: Skin is warm and dry  Capillary Refill: Capillary refill takes less than 2 seconds  Neurological:      General: No focal deficit present  Mental Status: She is alert and oriented to person, place, and time  Cranial Nerves: Cranial nerves are intact  Sensory: Sensation is intact  Motor: Motor function is intact  Coordination: Coordination is intact  Gait: Gait is intact  Deep Tendon Reflexes: Reflexes are normal and symmetric  Psychiatric:         Mood and Affect: Mood normal          Behavior: Behavior normal          Thought Content: Thought content normal          Judgment: Judgment normal            BMI Counseling: Body mass index is 45 7 kg/m²  The BMI is above normal  Nutrition recommendations include decreasing portion sizes, encouraging healthy choices of fruits and vegetables, moderation in carbohydrate intake and increasing intake of lean protein  Exercise recommendations include exercising 3-5 times per week and obtaining a gym membership  No pharmacotherapy was ordered  BMI Counseling: Body mass index is 45 7 kg/m²  The BMI is above normal  Nutrition recommendations include reducing portion sizes, decreasing overall calorie intake, 3-5 servings of fruits/vegetables daily and reducing fast food intake  Exercise recommendations include exercising 3-5 times per week and joining a gym

## 2021-07-12 NOTE — PATIENT INSTRUCTIONS
Musculoskeletal Pain   WHAT YOU NEED TO KNOW:   Muscle pain can be dull, achy, or sharp  You may have pain and tenderness to the touch as well  It can occur anywhere on your body and is often brought on by exercise  Muscle pain may occur from an injury, such as a sprain, tendonitis, or bone fracture  Muscle pain can also be the result of medical conditions, such as polymyositis, fibromyalgia, and connective tissue disorders  DISCHARGE INSTRUCTIONS:   Self care:   · Rest  as directed and avoid activity that causes pain  You may be able to return to normal activity when you can move without pain  Follow directions for rest and activity  You are at risk for injury for 3 weeks after your symptoms go away  · Ice  your painful muscle area to decrease pain and swelling  Use an ice pack, or put ice in a plastic bag and cover it with a towel  Always  put a cloth between the ice and your skin  Apply the ice as often as directed for the first 24 to 48 hours  · Compression  with a splint, brace, or elastic bandage helps decrease pain and swelling  This may be needed for muscle pain in arms or legs  A splint, brace, or bandage will also help protect the painful area when you move around  · Elevate  a painful arm or leg to reduce swelling and pain  Elevate your limb while you are sitting or lying down  Prop a painful leg on pillows to keep it above the level of your heart  Medicines:   · NSAIDs  help decrease swelling and pain or fever  This medicine is available with or without a doctor's order  NSAIDs can cause stomach bleeding or kidney problems in certain people  If you take blood thinner medicine, always ask your healthcare provider if NSAIDs are safe for you  Always read the medicine label and follow directions  · Acetaminophen  is used to decrease pain  It is available without a doctor's order  Ask your healthcare provider how much to take and when to take it  Follow directions   Acetaminophen can cause liver damage if not taken correctly  Do not take more than one medicine that contains acetaminophen unless directed  · Muscle relaxers  help relax your muscles to decrease pain and muscle spasms  · Steroids  may be given to decrease redness, pain, and swelling  · Take your medicine as directed  Contact your healthcare provider if you think your medicine is not helping or if you have side effects  Tell him if you are allergic to any medicine  Keep a list of the medicines, vitamins, and herbs you take  Include the amounts, and when and why you take them  Bring the list or the pill bottles to follow-up visits  Carry your medicine list with you in case of an emergency  Follow up with your healthcare provider as directed: You may need more tests to help healthcare providers find the cause of your muscle pain  You may need physical therapy to learn muscle strengthening exercises  Write down your questions so you remember to ask them during your visits  Contact your healthcare provider if:   · You have a fever  · You have trouble sleeping because of your pain  · Your painful area becomes more tender, red, and warm to the touch  · You have decreased movement of the painful area  · You have questions or concerns about your condition or care  Return to the emergency department if:   · You have increased severe pain when you move the painful muscle area  · You lose feeling in your painful muscle area  · You have new or worse swelling in the painful area  Your skin may feel tight  · You have increased muscle pain or pain that does not improve with treatment  © Copyright [address] Information is for End User's use only and may not be sold, redistributed or otherwise used for commercial purposes  All illustrations and images included in CareNotes® are the copyrighted property of [employer]  or Kev Contreras  The above information is an  only   It is not intended as medical advice for individual conditions or treatments  Talk to your doctor, nurse or pharmacist before following any medical regimen to see if it is safe and effective for you

## 2021-11-02 ENCOUNTER — OFFICE VISIT (OUTPATIENT)
Dept: FAMILY MEDICINE CLINIC | Facility: CLINIC | Age: 36
End: 2021-11-02
Payer: COMMERCIAL

## 2021-11-02 ENCOUNTER — TELEPHONE (OUTPATIENT)
Dept: FAMILY MEDICINE CLINIC | Facility: CLINIC | Age: 36
End: 2021-11-02

## 2021-11-02 ENCOUNTER — APPOINTMENT (EMERGENCY)
Dept: CT IMAGING | Facility: HOSPITAL | Age: 36
End: 2021-11-02
Payer: COMMERCIAL

## 2021-11-02 ENCOUNTER — HOSPITAL ENCOUNTER (EMERGENCY)
Facility: HOSPITAL | Age: 36
Discharge: HOME | End: 2021-11-02
Attending: EMERGENCY MEDICINE
Payer: COMMERCIAL

## 2021-11-02 VITALS
WEIGHT: 268 LBS | HEIGHT: 63 IN | HEART RATE: 64 BPM | DIASTOLIC BLOOD PRESSURE: 81 MMHG | BODY MASS INDEX: 47.48 KG/M2 | TEMPERATURE: 98 F | RESPIRATION RATE: 16 BRPM | SYSTOLIC BLOOD PRESSURE: 131 MMHG | OXYGEN SATURATION: 100 %

## 2021-11-02 VITALS
DIASTOLIC BLOOD PRESSURE: 80 MMHG | SYSTOLIC BLOOD PRESSURE: 110 MMHG | HEIGHT: 63 IN | TEMPERATURE: 98.4 F | BODY MASS INDEX: 47.48 KG/M2 | HEART RATE: 80 BPM | WEIGHT: 268 LBS

## 2021-11-02 DIAGNOSIS — S09.90XD INJURY OF HEAD, SUBSEQUENT ENCOUNTER: ICD-10-CM

## 2021-11-02 DIAGNOSIS — H57.02 PUPIL ASYMMETRY: ICD-10-CM

## 2021-11-02 DIAGNOSIS — R51.9 HEADACHE: Primary | ICD-10-CM

## 2021-11-02 DIAGNOSIS — R51.9 ACUTE INTRACTABLE HEADACHE, UNSPECIFIED HEADACHE TYPE: Primary | ICD-10-CM

## 2021-11-02 DIAGNOSIS — R42 VERTIGO: ICD-10-CM

## 2021-11-02 DIAGNOSIS — E66.01 CLASS 3 SEVERE OBESITY DUE TO EXCESS CALORIES WITHOUT SERIOUS COMORBIDITY WITH BODY MASS INDEX (BMI) OF 45.0 TO 49.9 IN ADULT (HCC): ICD-10-CM

## 2021-11-02 PROBLEM — J45.901 REACTIVE AIRWAY DISEASE WITH ACUTE EXACERBATION: Status: RESOLVED | Noted: 2018-07-02 | Resolved: 2021-11-02

## 2021-11-02 PROBLEM — E66.9 OBESITY: Status: ACTIVE | Noted: 2017-08-02

## 2021-11-02 PROBLEM — J06.9 UPPER RESPIRATORY INFECTION: Status: RESOLVED | Noted: 2018-07-02 | Resolved: 2021-11-02

## 2021-11-02 LAB
ALBUMIN SERPL BCP-MCNC: 3.4 G/DL (ref 3.5–5)
ALP SERPL-CCNC: 64 U/L (ref 46–116)
ALT SERPL W P-5'-P-CCNC: 21 U/L (ref 12–78)
ANION GAP SERPL CALCULATED.3IONS-SCNC: 11 MMOL/L (ref 4–13)
APTT PPP: 23 SECONDS (ref 23–37)
AST SERPL W P-5'-P-CCNC: 19 U/L (ref 5–45)
BACTERIA UR QL AUTO: ABNORMAL /HPF
BASOPHILS # BLD AUTO: 0.04 THOUSANDS/ΜL (ref 0–0.1)
BASOPHILS NFR BLD AUTO: 1 % (ref 0–1)
BILIRUB SERPL-MCNC: 0.4 MG/DL (ref 0.2–1)
BILIRUB UR QL STRIP: NEGATIVE
BUN SERPL-MCNC: 12 MG/DL (ref 5–25)
CALCIUM ALBUM COR SERPL-MCNC: 9.3 MG/DL (ref 8.3–10.1)
CALCIUM SERPL-MCNC: 8.8 MG/DL (ref 8.3–10.1)
CHLORIDE SERPL-SCNC: 104 MMOL/L (ref 100–108)
CLARITY UR: CLEAR
CO2 SERPL-SCNC: 24 MMOL/L (ref 21–32)
COLOR UR: YELLOW
CREAT SERPL-MCNC: 0.96 MG/DL (ref 0.6–1.3)
EOSINOPHIL # BLD AUTO: 0.06 THOUSAND/ΜL (ref 0–0.61)
EOSINOPHIL NFR BLD AUTO: 1 % (ref 0–6)
ERYTHROCYTE [DISTWIDTH] IN BLOOD BY AUTOMATED COUNT: 11.9 % (ref 11.6–15.1)
EXT PREG TEST URINE: NEGATIVE
EXT. CONTROL ED NAV: NORMAL
GFR SERPL CREATININE-BSD FRML MDRD: 76 ML/MIN/1.73SQ M
GLUCOSE SERPL-MCNC: 98 MG/DL (ref 65–140)
GLUCOSE UR STRIP-MCNC: NEGATIVE MG/DL
HCT VFR BLD AUTO: 38.8 % (ref 34.8–46.1)
HGB BLD-MCNC: 13 G/DL (ref 11.5–15.4)
HGB UR QL STRIP.AUTO: NEGATIVE
IMM GRANULOCYTES # BLD AUTO: 0.02 THOUSAND/UL (ref 0–0.2)
IMM GRANULOCYTES NFR BLD AUTO: 0 % (ref 0–2)
INR PPP: 0.91 (ref 0.84–1.19)
KETONES UR STRIP-MCNC: NEGATIVE MG/DL
LEUKOCYTE ESTERASE UR QL STRIP: ABNORMAL
LYMPHOCYTES # BLD AUTO: 2.37 THOUSANDS/ΜL (ref 0.6–4.47)
LYMPHOCYTES NFR BLD AUTO: 35 % (ref 14–44)
MAGNESIUM SERPL-MCNC: 2.2 MG/DL (ref 1.6–2.6)
MCH RBC QN AUTO: 31 PG (ref 26.8–34.3)
MCHC RBC AUTO-ENTMCNC: 33.5 G/DL (ref 31.4–37.4)
MCV RBC AUTO: 93 FL (ref 82–98)
MONOCYTES # BLD AUTO: 0.42 THOUSAND/ΜL (ref 0.17–1.22)
MONOCYTES NFR BLD AUTO: 6 % (ref 4–12)
NEUTROPHILS # BLD AUTO: 3.95 THOUSANDS/ΜL (ref 1.85–7.62)
NEUTS SEG NFR BLD AUTO: 57 % (ref 43–75)
NITRITE UR QL STRIP: NEGATIVE
NON-SQ EPI CELLS URNS QL MICRO: ABNORMAL /HPF
NRBC BLD AUTO-RTO: 0 /100 WBCS
PH UR STRIP.AUTO: 7 [PH] (ref 4.5–8)
PLATELET # BLD AUTO: 273 THOUSANDS/UL (ref 149–390)
PMV BLD AUTO: 9.3 FL (ref 8.9–12.7)
POTASSIUM SERPL-SCNC: 5 MMOL/L (ref 3.5–5.3)
PROT SERPL-MCNC: 7.2 G/DL (ref 6.4–8.2)
PROT UR STRIP-MCNC: NEGATIVE MG/DL
PROTHROMBIN TIME: 12 SECONDS (ref 11.6–14.5)
RBC # BLD AUTO: 4.19 MILLION/UL (ref 3.81–5.12)
RBC #/AREA URNS AUTO: ABNORMAL /HPF
SODIUM SERPL-SCNC: 139 MMOL/L (ref 136–145)
SP GR UR STRIP.AUTO: 1.02 (ref 1–1.03)
TSH SERPL DL<=0.05 MIU/L-ACNC: 1.47 UIU/ML (ref 0.36–3.74)
UROBILINOGEN UR QL STRIP.AUTO: 0.2 E.U./DL
WBC # BLD AUTO: 6.86 THOUSAND/UL (ref 4.31–10.16)
WBC #/AREA URNS AUTO: ABNORMAL /HPF

## 2021-11-02 PROCEDURE — 36415 COLL VENOUS BLD VENIPUNCTURE: CPT | Performed by: EMERGENCY MEDICINE

## 2021-11-02 PROCEDURE — 96365 THER/PROPH/DIAG IV INF INIT: CPT

## 2021-11-02 PROCEDURE — 96366 THER/PROPH/DIAG IV INF ADDON: CPT

## 2021-11-02 PROCEDURE — 81001 URINALYSIS AUTO W/SCOPE: CPT

## 2021-11-02 PROCEDURE — 99284 EMERGENCY DEPT VISIT MOD MDM: CPT | Performed by: EMERGENCY MEDICINE

## 2021-11-02 PROCEDURE — 80053 COMPREHEN METABOLIC PANEL: CPT | Performed by: EMERGENCY MEDICINE

## 2021-11-02 PROCEDURE — 99284 EMERGENCY DEPT VISIT MOD MDM: CPT

## 2021-11-02 PROCEDURE — 84443 ASSAY THYROID STIM HORMONE: CPT | Performed by: EMERGENCY MEDICINE

## 2021-11-02 PROCEDURE — 83735 ASSAY OF MAGNESIUM: CPT | Performed by: EMERGENCY MEDICINE

## 2021-11-02 PROCEDURE — 96368 THER/DIAG CONCURRENT INF: CPT

## 2021-11-02 PROCEDURE — 3008F BODY MASS INDEX DOCD: CPT | Performed by: FAMILY MEDICINE

## 2021-11-02 PROCEDURE — 81025 URINE PREGNANCY TEST: CPT | Performed by: EMERGENCY MEDICINE

## 2021-11-02 PROCEDURE — 70498 CT ANGIOGRAPHY NECK: CPT

## 2021-11-02 PROCEDURE — 85610 PROTHROMBIN TIME: CPT | Performed by: EMERGENCY MEDICINE

## 2021-11-02 PROCEDURE — 85730 THROMBOPLASTIN TIME PARTIAL: CPT | Performed by: EMERGENCY MEDICINE

## 2021-11-02 PROCEDURE — 1036F TOBACCO NON-USER: CPT | Performed by: FAMILY MEDICINE

## 2021-11-02 PROCEDURE — 99215 OFFICE O/P EST HI 40 MIN: CPT | Performed by: FAMILY MEDICINE

## 2021-11-02 PROCEDURE — 96375 TX/PRO/DX INJ NEW DRUG ADDON: CPT

## 2021-11-02 PROCEDURE — G1004 CDSM NDSC: HCPCS

## 2021-11-02 PROCEDURE — 70496 CT ANGIOGRAPHY HEAD: CPT

## 2021-11-02 PROCEDURE — 85025 COMPLETE CBC W/AUTO DIFF WBC: CPT | Performed by: EMERGENCY MEDICINE

## 2021-11-02 RX ORDER — KETOROLAC TROMETHAMINE 30 MG/ML
30 INJECTION, SOLUTION INTRAMUSCULAR; INTRAVENOUS ONCE
Status: COMPLETED | OUTPATIENT
Start: 2021-11-02 | End: 2021-11-02

## 2021-11-02 RX ORDER — MAGNESIUM SULFATE HEPTAHYDRATE 40 MG/ML
2 INJECTION, SOLUTION INTRAVENOUS ONCE
Status: COMPLETED | OUTPATIENT
Start: 2021-11-02 | End: 2021-11-02

## 2021-11-02 RX ORDER — MECLIZINE HYDROCHLORIDE 25 MG/1
25 TABLET ORAL 3 TIMES DAILY PRN
Qty: 20 TABLET | Refills: 0 | Status: SHIPPED | OUTPATIENT
Start: 2021-11-02 | End: 2021-12-30 | Stop reason: SDUPTHER

## 2021-11-02 RX ORDER — DIPHENHYDRAMINE HYDROCHLORIDE 50 MG/ML
25 INJECTION INTRAMUSCULAR; INTRAVENOUS ONCE
Status: COMPLETED | OUTPATIENT
Start: 2021-11-02 | End: 2021-11-02

## 2021-11-02 RX ORDER — METOCLOPRAMIDE HYDROCHLORIDE 5 MG/ML
10 INJECTION INTRAMUSCULAR; INTRAVENOUS ONCE
Status: COMPLETED | OUTPATIENT
Start: 2021-11-02 | End: 2021-11-02

## 2021-11-02 RX ORDER — MECLIZINE HCL 12.5 MG/1
25 TABLET ORAL ONCE
Status: COMPLETED | OUTPATIENT
Start: 2021-11-02 | End: 2021-11-02

## 2021-11-02 RX ADMIN — DIPHENHYDRAMINE HYDROCHLORIDE 25 MG: 50 INJECTION, SOLUTION INTRAMUSCULAR; INTRAVENOUS at 12:03

## 2021-11-02 RX ADMIN — MAGNESIUM SULFATE HEPTAHYDRATE 2 G: 40 INJECTION, SOLUTION INTRAVENOUS at 12:04

## 2021-11-02 RX ADMIN — IOHEXOL 100 ML: 350 INJECTION, SOLUTION INTRAVENOUS at 13:34

## 2021-11-02 RX ADMIN — KETOROLAC TROMETHAMINE 30 MG: 30 INJECTION, SOLUTION INTRAMUSCULAR; INTRAVENOUS at 12:04

## 2021-11-02 RX ADMIN — SODIUM CHLORIDE, SODIUM LACTATE, POTASSIUM CHLORIDE, AND CALCIUM CHLORIDE 1000 ML: .6; .31; .03; .02 INJECTION, SOLUTION INTRAVENOUS at 11:56

## 2021-11-02 RX ADMIN — METOCLOPRAMIDE 10 MG: 5 INJECTION, SOLUTION INTRAMUSCULAR; INTRAVENOUS at 12:03

## 2021-11-02 RX ADMIN — MECLIZINE 25 MG: 12.5 TABLET ORAL at 11:29

## 2021-11-03 ENCOUNTER — TELEPHONE (OUTPATIENT)
Dept: FAMILY MEDICINE CLINIC | Facility: CLINIC | Age: 36
End: 2021-11-03

## 2021-12-30 ENCOUNTER — OFFICE VISIT (OUTPATIENT)
Dept: FAMILY MEDICINE CLINIC | Facility: CLINIC | Age: 36
End: 2021-12-30
Payer: COMMERCIAL

## 2021-12-30 VITALS
WEIGHT: 272.25 LBS | HEIGHT: 63 IN | TEMPERATURE: 96.6 F | SYSTOLIC BLOOD PRESSURE: 122 MMHG | DIASTOLIC BLOOD PRESSURE: 80 MMHG | BODY MASS INDEX: 48.24 KG/M2

## 2021-12-30 DIAGNOSIS — R42 VERTIGO: Primary | ICD-10-CM

## 2021-12-30 DIAGNOSIS — H61.23 BILATERAL IMPACTED CERUMEN: ICD-10-CM

## 2021-12-30 DIAGNOSIS — Z13.220 SCREENING FOR LIPID DISORDERS: ICD-10-CM

## 2021-12-30 DIAGNOSIS — Z12.4 SCREENING FOR CERVICAL CANCER: ICD-10-CM

## 2021-12-30 PROCEDURE — 3008F BODY MASS INDEX DOCD: CPT | Performed by: FAMILY MEDICINE

## 2021-12-30 PROCEDURE — 99214 OFFICE O/P EST MOD 30 MIN: CPT | Performed by: NURSE PRACTITIONER

## 2021-12-30 PROCEDURE — 69210 REMOVE IMPACTED EAR WAX UNI: CPT | Performed by: NURSE PRACTITIONER

## 2021-12-30 RX ORDER — MECLIZINE HYDROCHLORIDE 25 MG/1
25 TABLET ORAL 3 TIMES DAILY PRN
Qty: 20 TABLET | Refills: 0 | Status: SHIPPED | OUTPATIENT
Start: 2021-12-30 | End: 2022-02-08 | Stop reason: SDUPTHER

## 2022-01-05 ENCOUNTER — EVALUATION (OUTPATIENT)
Dept: PHYSICAL THERAPY | Facility: REHABILITATION | Age: 37
End: 2022-01-05
Payer: COMMERCIAL

## 2022-01-05 DIAGNOSIS — R42 VERTIGO: Primary | ICD-10-CM

## 2022-01-05 PROCEDURE — 97162 PT EVAL MOD COMPLEX 30 MIN: CPT | Performed by: PHYSICAL THERAPIST

## 2022-01-05 NOTE — PROGRESS NOTES
PHYSICAL THERAPY EVALUATION     Name: Supa Cobb  Date: 22  : 1985  Referring Provider: LYNDSEY Barrera  AUTHORIZATION:   Insurance: Payor: [employer] / Plan: [facility] PLAN 361 / Product Type: Blue Fee for Service /     SUBJECTIVE:  HPI: Supa Cobb is a 39 y o  female referred to outpatient physical therapy for the following diagnosis   Encounter Diagnosis   Name Primary? 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[employer], COVID specialist, works with clients about getting PPE, works on independent skills  Spends a lot of time sitting in front of screen  Has had headaches and issues with lights  Used to have to use sunglasses occasionally  Memory is also an issue  Now wears sunglasses indoors, can last about an hour without glasses  Better at home, can keep lights dim and take more breaks, has difficulty being on a computer a long time  Feels room start tilting, sometimes just 10 seconds, sometimes 2 minutes  Generally settles with sitting, but now increasing in frequency and intensity  Vestibular Screening    Aural fullness  No   Tinnitus  No   Known hearing loss  No   Changes in vision  No      Not taking Meclizine due to appointment today  Meclizine not as effective for recent more intense episodes  Not sleeping well, has always had an issue with insomnia  Concern that this is the new normal, averaging 3-6 hours  Used to walk around the office, 5x/day  Goes up and down stairway outside [address] [place] [address] [facility], hasn't in past two weeks due to this issue      Had prescription for headaches, but usually just takes ibuprofen  Some difficulty in finding words and keeping timelines and details straight  Worse with word-finding with current job  Not comfortable walking unaided, sometimes uses old cane around  Patient goals: resolution of symptoms  Past Medical History:   Diagnosis Date    Edema of left lower extremity 7/12/2021    TBI (traumatic [facility])     [id number] stated by patient       Current Outpatient Medications:     meclizine (ANTIVERT) 25 mg tablet, Take 1 tablet (25 mg total) by mouth 3 (three) times a day as needed for dizziness for up to 20 doses, Disp: 20 tablet, Rfl: 0      OBJECTIVE:  Oculomotor Function    Resting Nystagmus  Normal   Gaze Holding Nystagmus  Normal   Smooth pursuit  Normal   Cross-Cover Testing  Not tested       Coordination Testing Normal / Abnormal Notes   Fingertip to Nose Normal    Rapidly Alternating Hand Movements Normal       Static Balance    Romberg Abnormal     /84 manual    Balance & Mobility Measures 1/05/22   Assistive device used? None   Walking speed    Functional Gait Assessment (see below)    Patient-Reported Outcome Measure:       Positional Vertigo Tests:  Right Left   Neck range of motion Within functional limits    [place] negative negative   Roll Test negative negative   Nystagmus: No   Type: none    4 Item Dynamic Gait Index  1/3 Gait level surface  2/3 Change in gait speed  2/3 Gait with horizontal head turns  2/3 Gait with vertical head turns      ASSESSMENT:  Pipo Valle is a 39year old female with some recrudescence of post-concussion symptoms  Limitations include dizziness, headaches, light sensitivity, and imbalance  We didn't see any positional vertigo  Performance on dynamic balance test is below expected and can somewhat result from slower speed of movements  Pipo Valle had returned to some of her prior exercises including some gaze stabilization exercises    We discussed habituation, how it's great to get moving and challenge ourselves but we want to get the intensity right so that we respond adaptively to normal stimuli, such as light or self-movement  SHORT-TERM GOALS: by 2/06/22  1  Patient scores at least 25/30 on FGA  2  Patient tolerates at least 20 minutes of walking at least 5 times per week  LONG-TERM GOALS: by 3/06/22  1  Patient reports 2/10 symptoms with return to all prior activities  2  Patient scores within normal limits on FGA, 6 minute walk test, and VOMS  Precautions - none  Exercise Diary  1/5/2022     Home exercise program      Endurance      Static and dynamic balance      Repositioning maneuvers                    PLAN OF CARE:  Patient will benefit from physical therapy 1-2 times per week for 1 month  Neuromuscular re-education, therapeutic exercises, and therapeutic activities as outlined in grids      Afla Parks, PT  1/5/2022

## 2022-01-12 ENCOUNTER — APPOINTMENT (OUTPATIENT)
Dept: PHYSICAL THERAPY | Facility: REHABILITATION | Age: 37
End: 2022-01-12
Payer: COMMERCIAL

## 2022-01-19 ENCOUNTER — OFFICE VISIT (OUTPATIENT)
Dept: PHYSICAL THERAPY | Facility: REHABILITATION | Age: 37
End: 2022-01-19
Payer: COMMERCIAL

## 2022-01-19 DIAGNOSIS — R42 VERTIGO: Primary | ICD-10-CM

## 2022-01-19 PROCEDURE — 97110 THERAPEUTIC EXERCISES: CPT | Performed by: PHYSICAL THERAPIST

## 2022-01-19 PROCEDURE — 97112 NEUROMUSCULAR REEDUCATION: CPT | Performed by: PHYSICAL THERAPIST

## 2022-01-19 NOTE — PROGRESS NOTES
PHYSICAL THERAPY TREATMENT    Name: Mini Emanuel  Date: 22  : 1985  Referring Provider: LYNDSEY Simmons  AUTHORIZATION:   Insurance: Payor: [employer] / Plan: [employer] PLAN 361 / Product Type: Blue Fee for Service /     SUBJECTIVE:  HPI: Mini Emanuel is a 39 y o  female referred to outpatient physical therapy for the following diagnosis   Encounter Diagnosis   Name Primary?  Vertigo Yes         Feels about the same, baseline level of dizziness  Sleeping a bit better, 5-6 hours  Feels somewhat dizzy with walking head movement exercises  Headaches with too much exposure to screens, too much time under bright lights, or if a couple bad nights of sleep  Long history of concussions  Patient goals: resolution of symptoms        Past Medical History:   Diagnosis Date    Edema of left lower extremity 2021    [facility] (traumatic [facility]) [facility])     [id number] stated by patient     OBJECTIVE:    Precautions - none  Exercise Diary  22     Home exercise program Overground walking    Walking with intermittent head turns    Right neck rotation stretch, trigger points to superior aspect of left SCM       Endurance SciFit recumbent stepper, level 1 increased to 4 and maintaining at least 90 steps/min until about 8 minutes into exercise, heart rate 120s bpm, at which time patient began with headache; we continued but decreased resistance to 1-2 and decreased speed to under 80 step/min, headache reduced accordingly  12 min total  Headache upon stopping, with continued 2-3 minutes at a very slow pace to allow heart rate to decrease without provoking headache     Static and dynamic balance   Walking head movements horizontally every few steps, 100 feet x 3 sets  Imbalance/dizziness about 1-2 times per 100 feet walked, patient somewhat symptomatic by the end of the 100 feet walked but it dissipated after standing rest         Repositioning maneuvers Trigger point to left SCM followed by right neck rotation stretches in neutral neck position, 10 min                   ASSESSMENT:  Marjorie Childers does have some exertional intolerance, as shown by provocation of symptoms with stationary recumbent stepper  We discussed how she'd benefit from return to purposeful walking for exercise, with gradual ramp-up and ramp-down to minimize symptoms  We discussed that symptoms are not harm, but we want to generally work towards mild to moderate symptoms with things like walking with head movements, we are working on habituation there  She is already doing something similar with screen use and with being able to go without sunglasses, which she did for the entirety of the session today, which is great  Some tightness with end-range neck rotation, also experienced with looking to right while walking, so we want to eliminate any neck stiffness contribution to dizziness  SHORT-TERM GOALS: by 2/06/22  1  Patient scores at least 25/30 on FGA  2  Patient tolerates at least 20 minutes of walking at least 5 times per week  LONG-TERM GOALS: by 3/06/22  1  Patient reports 2/10 symptoms with return to all prior activities  2  Patient scores within normal limits on FGA, 6 minute walk test, and VOMS  PLAN OF CARE:  Patient will benefit from physical therapy 1-2 times per week to 1x/2 weeks for 1 month  Neuromuscular re-education, therapeutic exercises, and therapeutic activities as outlined in zora Day, PT  1/19/2022

## 2022-02-02 ENCOUNTER — OFFICE VISIT (OUTPATIENT)
Dept: NEUROLOGY | Facility: CLINIC | Age: 37
End: 2022-02-02
Payer: COMMERCIAL

## 2022-02-02 ENCOUNTER — EVALUATION (OUTPATIENT)
Dept: PHYSICAL THERAPY | Facility: REHABILITATION | Age: 37
End: 2022-02-02
Payer: COMMERCIAL

## 2022-02-02 VITALS
HEIGHT: 63 IN | BODY MASS INDEX: 47.66 KG/M2 | SYSTOLIC BLOOD PRESSURE: 131 MMHG | HEART RATE: 70 BPM | DIASTOLIC BLOOD PRESSURE: 70 MMHG | WEIGHT: 269 LBS

## 2022-02-02 DIAGNOSIS — R06.83 SNORING: ICD-10-CM

## 2022-02-02 DIAGNOSIS — G43.009 MIGRAINE WITHOUT AURA AND WITHOUT STATUS MIGRAINOSUS, NOT INTRACTABLE: ICD-10-CM

## 2022-02-02 DIAGNOSIS — E55.9 VITAMIN D DEFICIENCY: ICD-10-CM

## 2022-02-02 DIAGNOSIS — R42 VERTIGO: Primary | ICD-10-CM

## 2022-02-02 DIAGNOSIS — R51.9 WORSENING HEADACHES: Primary | ICD-10-CM

## 2022-02-02 DIAGNOSIS — E53.8 B12 DEFICIENCY: ICD-10-CM

## 2022-02-02 PROCEDURE — 97112 NEUROMUSCULAR REEDUCATION: CPT | Performed by: PHYSICAL THERAPIST

## 2022-02-02 PROCEDURE — 97110 THERAPEUTIC EXERCISES: CPT | Performed by: PHYSICAL THERAPIST

## 2022-02-02 PROCEDURE — 99205 OFFICE O/P NEW HI 60 MIN: CPT | Performed by: PHYSICIAN ASSISTANT

## 2022-02-02 NOTE — ASSESSMENT & PLAN NOTE
Preventive therapy:   Reproductive age women: Should take folic acid daily when taking anti-seizure drugs especially Depakote   -Over-the-counter supplements: to decrease intensity and frequency of migraines  - Magnesium Oxide 400mg a day  If any diarrhea or upset stomach, decrease dose  as tolerated  (oral magnesium oxide may be an effective preventive strategy for people with migraine  Some theories about how it works include the idea that magnesium can help to prevent waves of cortical spreading depression and aura  Magnesium, in theory, also reduces the release of inflammatory or activating chemicals that can cause migraine)  - Vitamin B2 400 mg a day  May cause the urine to turn yellow which is normal for B 2 to do and is not a sign that you are dehydrated  (may be an effective preventive medication in some people with migraine)    Abortive: At onset of migraine (headache over 4/10) take ibuprofen 600 mg or Tylenol  May repeat in 6-8 hours if needed    Limit of 3 doses a week

## 2022-02-02 NOTE — PROGRESS NOTES
[facility]  PATIENT:  Supa Cobb  MRN:  [id number]  :  1985  DATE OF SERVICE:  2022      Assessment/Plan:        Problem List Items Addressed This Visit        Cardiovascular and Mediastinum    Migraine without aura and without status migrainosus, not intractable     Preventive therapy:   Reproductive age women: Should take folic acid daily when taking anti-seizure drugs especially Depakote   -Over-the-counter supplements: to decrease intensity and frequency of migraines  - Magnesium Oxide 400mg a day  If any diarrhea or upset stomach, decrease dose  as tolerated  (oral magnesium oxide may be an effective preventive strategy for people with migraine  Some theories about how it works include the idea that magnesium can help to prevent waves of cortical spreading depression and aura  Magnesium, in theory, also reduces the release of inflammatory or activating chemicals that can cause migraine)  - Vitamin B2 400 mg a day  May cause the urine to turn yellow which is normal for B 2 to do and is not a sign that you are dehydrated  (may be an effective preventive medication in some people with migraine)    Abortive: At onset of migraine (headache over 4/10) take ibuprofen 600 mg or Tylenol  May repeat in 6-8 hours if needed  Limit of 3 doses a week         Relevant Orders    MRI brain with and without contrast    BUN    Creatinine, serum       Other    Worsening headaches - Primary     Patient with worsening headaches over the past 4 months  Did have asymmetric and sluggish pupils on exam by PCP in 2021  Patient also has worsening dizziness and gait  Thus we will obtain an MRI of the brain to ensure no hidden pathology causing the worsening headaches, abnormal pupils, worsening dizziness and unsteady gait           Relevant Orders    MRI brain with and without contrast    BUN    Creatinine, serum    B12 deficiency    Relevant Orders    Vitamin B12    Vitamin D deficiency Relevant Orders    Vitamin D 25 hydroxy    Snoring     Will need a sleep referral consult after initial work up is complete  History of Present Illness: We had the pleasure of evaluating Suma Espino in neurological consultation today for headaches  As you know,  she is a 39 y o  left handed  female  Patient is a community  for [facility] independent living  Medical history review:  QTc:  5/2014 429 ms   Tobacco use: never  Obesity  Vertigo    Headaches:     Hx of concussion starting in 9th grade  States that she hit her head during indoor soccer and perhaps had a few seconds of LOC  Has 3+ in her past per patient  Mid 20s states that she has "post concussive symptoms" with any blows to the head  2/29/2020 hit her head on her car  Didn't have LOC  Headaches, motion and light sensitivity started 20 minutes after she hit her head    Did fall over on 3/4/2020 when she was stepping out of her house  Went to ER and then did therapy afterwards  Quit her job after this  Patient started a job in August 2021 after being out of work for 1 5 years  She feels like this was over stimulating for her  She felt noise and light were overwhelming for her  November of 2021 patient presented to her family doctor with a severe headache that was unrelenting for 1 and half weeks  Patient had stabbing on the left side of her head  She had asymmetric pupils during the exam as well per documentation by PCP  Finally, patient reported dizziness and felt unsteady  She was sent to the emergency room for evaluation via ambulance  CTA of the head and neck was done in the emergency room which was negative    Since the ER, states that she developed dizziness 1 week before [place] out of nowhere  Has been working from home since 12/27/2021 due to her dizziness  Does wear her sunglasses frequently during the day indoors    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 unsure  Any family history of brain aneurysms? none    Have you seen someone else for headaches or pain? PCP     Headaches started at what age? Unclear, patient does have light sensitivity, usually in her mid 25s    What is your current pain level? 0/10    How often do the headaches occur? Mild headaches: 1-2 times a week currently (daily when working at her office)  Moderate to severe headaches: only 1 since working from home; prior when at office 1-2 a week    Are you ever headache free? yes     Aura/Warning and how long does it last? no    What time of the day do the headaches start? Mild headaches: 10-11 am  Moderate to severe headaches:  Usually mid morning to early afternoon    How long do the headaches last?   Mild headaches: 20 minutes to rest of the day  Moderate to severe headaches: 1 hour to rest of the day    Where is your headache located? Mild headaches: band of pressure across frontalis to temporalis  Moderate to severe headaches: frontalis and radiates occipitalis    Describe your usual headache? Mild headaches: dull pressure  Moderate to severe headaches: ice pick; pressure, tightening in a vice    What is the intensity of pain?    Mild headaches: 1/10  Moderate to severe headaches: 6-9/10  Ice pick 10/10    Associated symptoms:   - Decreased appetite, Nausea  - Photophobia, Phonophobia, Osmophobia    - Stiff or sore neck   - Dizziness, light headed  - Problems with concentration  - Blurred vision   - Change in pupil size        - Tinnitus   - better with lying down  - Prefer to be in a cool, quiet, dark room    Number of days missed per month because of headaches:  Work (or school) days: has had to leave early once and worked from home once  Social or Family activities: a few (more from dizziness)    Headache are worse if the patient: unsure  Headache triggers:  Bright light, constant sound  What time of the year do headaches occur more frequently? no    Have you had trigger point injection performed and how often? No  Have you had Botox injection performed and how often? No   Have you had epidural injections or transforaminal injections performed? No    Alternative therapies used in the past for headaches? Daith piercing, Massage, physical therapy, acupuncture, acupressure, chiropractor, yoga, biofeedback,  Have you used CBD or THC for your headaches and how often? No  How many caffeine products to drink a day? 1-2 sodas a day  How much water to drink a day? 4-6 glasses a day    Are you current pregnant or planning on getting pregnant? No, not currently sexually active    What medications do you take or have you taken for your headaches/pain/mood? Preventive therapy:   none  Abortive Therapy:   Toradol  Naproxen  Reglan  Tylenol, ibuprofen, naproxen         Sleep Habit:  Is your sleep restful? varies    Do you wake up with headaches? no    How many hours do you actually sleep? 4-5 a night  What time do you go to bed at night? 10 pm to 1 am (difficulty falling asleep)  What time do you wake up in am? 6 am- 730 am  How often do you get up at night? 1-2 times a night    Do you snore while asleep? yes  Have you been told that you stop breathing during sleeping? no  Do you wake up tired in the morning? yes  Do you take frequent naps during the day? occasionally  Do you have jaw pain? no  Do you grind/clench your teeth at night? yes  Do you have restless leg syndrome? no  Do you have nightmare or sleep walk? Has vivid dreams    Have you ever had any Brain imaging? Yes  11/2/2021 CTA Head and neck  Normal     - Reviewed old notes from physician seen in the past  - Reviewed images on Portal   I personally reviewed these images        Past Medical History:   Diagnosis Date    Edema of left lower extremity 7/12/2021    TBI (traumatic brain [facility]) [facility])     [id number] stated by patient       Patient Active Problem List   Diagnosis    Obesity    Head injury    Headache    Vertigo    Worsening headaches    Migraine without aura and without status migrainosus, not intractable    B12 deficiency    Vitamin D deficiency    Snoring       Medications:      Current Outpatient Medications   Medication Sig Dispense Refill    meclizine (ANTIVERT) 25 mg tablet Take 1 tablet (25 mg total) by mouth 3 (three) times a day as needed for dizziness for up to 20 doses 20 tablet 0     No current facility-administered medications for this visit  Allergies:    No Known Allergies    Family History:     Family History   Problem Relation Age of Onset   Gela Ortez Cancer Mother     Heart disease Father        Social History:     Social History     Socioeconomic History    Marital status: Single     Spouse name: Not on file    Number of children: Not on file    Years of education: Not on file    Highest education level: Not on file   Occupational History    Not on file   Tobacco Use    Smoking status: Never Smoker    Smokeless tobacco: Never Used   Vaping Use    Vaping Use: Never used   Substance and Sexual Activity    Alcohol use: Never    Drug use: Never    Sexual activity: Not on file   Other Topics Concern    Not on file   Social History Narrative    Not on file     Social Determinants of Health     Financial Resource Strain: Not on file   Food Insecurity: Not on file   Transportation Needs: Not on file   Physical Activity: Not on file   Stress: Not on file   Social Connections: Not on file   Intimate Partner Violence: Not on file   Housing Stability: Not on file      I have reviewed the patient's medical, social and surgical history as well as medications in detail and updated the computerized patient record        Objective:   Physical Exam: Vitals:               /70 (BP Location: Left arm, Patient Position: Sitting, Cuff Size: Standard)   Pulse 70   Ht 5' 3" (1 6 m)   Wt 122 kg (269 lb)   BMI 47 65 kg/m²   BP Readings from Last 3 Encounters:   02/02/22 131/70   12/30/21 122/80   11/02/21 131/81     Pulse Readings from Last 3 Encounters:   02/02/22 70   11/02/21 64   11/02/21 80              CONSTITUTIONAL: Well developed, well nourished, well groomed  No dysmorphic features  Morbidly obese     Eyes:  PERRLA, EOM normal      Neck:  Normal ROM, neck supple  HEENT:  Normocephalic atraumatic  No meningismus  Oropharynx is clear and moist  No oral mucosal lesions  Chest:  Respirations regular and unlabored  Cardiovascular:  Distal extremities warm without palpable edema or tenderness, no observed significant swelling  Musculoskeletal:  Full range of motion  Skin:  warm and dry   Psychiatric:  Normal behavior and appropriate affect        Neurological Examination:     Mental status/cognitive function: Orientated to time, place and person  Cranial Nerves: 2 to 12 intact    Motor Exam:    5/5 right upper extremity  5/5 left upper extremity  5/5 right lower extremity  5/5 left lower extremity    Sensory:   grossly intact light touch in all extremities  Temperature and vibration normal    Reflexes:   2/4 right upper extremity  2/4 left upper extremity  2/4 right lower extremity  2/4 left lower extremity    Coordination:   - Finger to nose intact pass point on left, normal on right  - No tremor noted    Rapid movement normal    Gait: steady slow wide based gait, unable to do tandem gait, romberg swayed        Review of Systems:   Review of Systems    Constitutional: Negative  Negative for appetite change and fever  HENT: Negative  Negative for hearing loss, tinnitus, trouble swallowing and voice change  Eyes: Positive for visual disturbance (light sensitivity)  Negative for photophobia and pain  Respiratory: Negative  Negative for shortness of breath  Cardiovascular: Negative  Negative for palpitations  Gastrointestinal: Negative  Negative for nausea and vomiting  Endocrine: Negative  Negative for cold intolerance  Genitourinary: Negative  Negative for dysuria, frequency and urgency  Musculoskeletal: Negative  Negative for myalgias and neck pain  Skin: Negative  Negative for rash  Neurological: Positive for dizziness (still having dizzy spells since the ED)  Negative for tremors, seizures, syncope, facial asymmetry, speech difficulty, weakness, light-headedness, numbness and headaches  Hematological: Negative  Does not bruise/bleed easily  Psychiatric/Behavioral: Negative  Negative for confusion, hallucinations and sleep disturbance  All other systems reviewed and are negative      I have spent 60 minutes with Patient  today in which greater than 50% of this time was spent in counseling/coordination of care regarding Diagnostic results, Prognosis, Risks and benefits of tx options, Intructions for management, Patient and family education, Importance of tx compliance, Risk factor reductions, Impressions and Plan of care as above and 30 minutes of non-face to face time      Author:  Harshad Morales PA-C 2/2/2022 3:16 PM

## 2022-02-02 NOTE — PATIENT INSTRUCTIONS
Preventive therapy:   Reproductive age women: Should take folic acid daily when taking anti-seizure drugs especially Depakote   -Over-the-counter supplements: to decrease intensity and frequency of migraines  - Magnesium Oxide 400mg a day  If any diarrhea or upset stomach, decrease dose  as tolerated  (oral magnesium oxide may be an effective preventive strategy for people with migraine  Some theories about how it works include the idea that magnesium can help to prevent waves of cortical spreading depression and aura  Magnesium, in theory, also reduces the release of inflammatory or activating chemicals that can cause migraine)  - Vitamin B2 400 mg a day  May cause the urine to turn yellow which is normal for B 2 to do and is not a sign that you are dehydrated  (may be an effective preventive medication in some people with migraine)    Abortive: At onset of migraine (headache over 4/10) take ibuprofen 600 mg or Tylenol  May repeat in 6-8 hours if needed  Limit of 3 doses a week      We will get your blood work and MRI  With your MRI there will likely be some small white matter changes or small foci  We will call if any alarming but you may reach out to us as well if needed     Headache management instructions  - When patient has a moderate to severe headache, they should seek rest, initiate relaxation and apply cold compresses to the head  - Maintain regular sleep schedule  Adults need at least 7-8 hours of uninterrupted a night  - Limit over the counter medications such as Tylenol, Ibuprofen, Aleve, Excedrin  (No more than 3 times a week)  - Maintain headache diary  We discussed an JEFFERSON for a smart phone is "Migraine brian"  - Limit caffeine to 1-2 cups 8 to 16 oz a day or less  - Avoid dietary trigger  (aged cheese, peanuts, MSG, aspartame and nitrates)  - Patient is to have regular frequent meals to prevent headache onset      - Please drink at least 64 ounces of water a day to help remain hydrated  Please call with any questions or concerns   Office number is [phone]

## 2022-02-02 NOTE — ASSESSMENT & PLAN NOTE
Patient with worsening headaches over the past 4 months  Did have asymmetric and sluggish pupils on exam by PCP in 11/2021  Patient also has worsening dizziness and gait  Thus we will obtain an MRI of the brain to ensure no hidden pathology causing the worsening headaches, abnormal pupils, worsening dizziness and unsteady gait

## 2022-02-02 NOTE — PROGRESS NOTES
Review of Systems   Constitutional: Negative  Negative for appetite change and fever  HENT: Negative  Negative for hearing loss, tinnitus, trouble swallowing and voice change  Eyes: Positive for visual disturbance (light sensitivity)  Negative for photophobia and pain  Respiratory: Negative  Negative for shortness of breath  Cardiovascular: Negative  Negative for palpitations  Gastrointestinal: Negative  Negative for nausea and vomiting  Endocrine: Negative  Negative for cold intolerance  Genitourinary: Negative  Negative for dysuria, frequency and urgency  Musculoskeletal: Negative  Negative for myalgias and neck pain  Skin: Negative  Negative for rash  Neurological: Positive for dizziness (still having dizzy spells since the ED)  Negative for tremors, seizures, syncope, facial asymmetry, speech difficulty, weakness, light-headedness, numbness and headaches  Hematological: Negative  Does not bruise/bleed easily  Psychiatric/Behavioral: Negative  Negative for confusion, hallucinations and sleep disturbance  All other systems reviewed and are negative

## 2022-02-03 NOTE — PROGRESS NOTES
PHYSICAL THERAPY TREATMENT  [facility]     Name: Sarah Thapa  Date: 22  : 1985  Referring Provider: LYNDSEY Coon  AUTHORIZATION:   Insurance: Payor: [employer] / Plan: CAPITAL BC PLAN 361 / Product Type: Blue Fee for Service /     SUBJECTIVE:  HPI: Sarah Thapa is a 39 y o  female referred to outpatient physical therapy for the following diagnosis   Encounter Diagnosis   Name Primary?  Vertigo Yes         Seen by neurology earlier today for continued headache, please see note dated 22  Please see details on headache from that note  Walks intermittently for exercise, does get some dizziness with turning and continues with some imbalance  Continues with sleep disruption, feels her brain doesn't slow down at that time, sleep can be very limited at times  Patient goals: resolution of symptoms  Past Medical History:   Diagnosis Date    Edema of left lower extremity 2021    TBI (traumatic brain [facility]) [facility])     [id number] stated by patient     OBJECTIVE:          Precautions - none  Exercise Diary  22    Home exercise program Overground walking    Walking with intermittent head turns    Right neck rotation stretch, trigger points to superior aspect of left SCM       Endurance SciFit recumbent stepper, level 1 increased to 4 and maintaining at least 90 steps/min until about 8 minutes into exercise, heart rate 120s bpm, at which time patient began with headache; we continued but decreased resistance to 1-2 and decreased speed to under 80 step/min, headache reduced accordingly  12 min total  Headache upon stopping, with continued 2-3 minutes at a very slow pace to allow heart rate to decrease without provoking headache SciFit recumbent stepper, level 1 - 5 increasing from 70 to 90 steps/minute to increase heart rate into high 120s bpm to 130 bpm, worked up over 2-3 minute span and decreasing slowly at the conclusion again over a 2-3 minute span, 24 min total, asymptomatic with this today    Functional Gait Assessment  1/3 Gait level surface  2/3 Change in gait speed  1/3 Gait with horizontal head turns  2/3 Gait with vertical head turns  2/3 Gait and pivot turn  1/3 Step over obstacle  0/3 Gait with narrow base of support  1/3 Gait with eyes closed  1/3 Ambulating backwards  2/3 Steps  13/30 Total score (less than 22/30 indicates increased risk of fall)         Static and dynamic balance   Walking head movements horizontally every few steps, 100 feet x 3 sets  Imbalance/dizziness about 1-2 times per 100 feet walked, patient somewhat symptomatic by the end of the 100 feet walked but it dissipated after standing rest     Reviewed habituation, as Vance Talamantes had done some gaze stabilization exercises previously  Scores as 4 line difference (under 3 is normal) for Dynamic Visual Acuity  Completed slow horizontal VOR x 1 10 sec x 4  Discussed importance of grading exercise  Repositioning maneuvers Trigger point to left SCM followed by right neck rotation stretches in neutral neck position, 10 min                   ASSESSMENT:  Vance Talamantes continues with symptoms but has made good progress in two areas--decreased photosensitivity, and good improvement in tolerance of exertion  She continues to score low with dynamic balance testing, mainly due to slowed pace of movement and intermittent reactive steps as needed  Balance does seem functional for activities around the home or targeted community ambulation  We discussed that we expect symptoms to resolve and that symptoms are not indication of harm  Good understanding  We also discussed habituation, how we want to properly grade activities such that our body re-learns to perceive these things as normal   We discussed sleep hygeine, and how it is difficult to control persistent thoughts but exercise and meditation helps some with this; also, establishing good sleep habits, even if initially unsuccessful  SHORT-TERM GOALS: by 2/06/22  1  Patient scores at least 25/30 on FGA  NOT MET  2  Patient tolerates at least 20 minutes of walking at least 5 times per week  NOT MET CONSISTENTLY  LONG-TERM GOALS: by 3/06/22  1  Patient reports 2/10 symptoms with return to all prior activities  2  Patient scores within normal limits on FGA, 6 minute walk test, and VOMS  PLAN OF CARE:  Patient will benefit from physical therapy 1 times per week to 1x/2 weeks for 1 month  Neuromuscular re-education, therapeutic exercises, and therapeutic activities as outlined in grids      Cande Stark, PT  2/3/2022

## 2022-02-08 ENCOUNTER — OFFICE VISIT (OUTPATIENT)
Dept: FAMILY MEDICINE CLINIC | Facility: CLINIC | Age: 37
End: 2022-02-08
Payer: COMMERCIAL

## 2022-02-08 VITALS
HEART RATE: 88 BPM | DIASTOLIC BLOOD PRESSURE: 72 MMHG | OXYGEN SATURATION: 99 % | RESPIRATION RATE: 16 BRPM | SYSTOLIC BLOOD PRESSURE: 114 MMHG | HEIGHT: 63 IN | WEIGHT: 270 LBS | BODY MASS INDEX: 47.84 KG/M2

## 2022-02-08 DIAGNOSIS — Z12.4 SCREENING FOR CERVICAL CANCER: ICD-10-CM

## 2022-02-08 DIAGNOSIS — E66.01 CLASS 3 SEVERE OBESITY DUE TO EXCESS CALORIES WITHOUT SERIOUS COMORBIDITY WITH BODY MASS INDEX (BMI) OF 45.0 TO 49.9 IN ADULT (HCC): ICD-10-CM

## 2022-02-08 DIAGNOSIS — R51.9 WORSENING HEADACHES: ICD-10-CM

## 2022-02-08 DIAGNOSIS — G43.009 MIGRAINE WITHOUT AURA AND WITHOUT STATUS MIGRAINOSUS, NOT INTRACTABLE: ICD-10-CM

## 2022-02-08 DIAGNOSIS — R42 VERTIGO: Primary | ICD-10-CM

## 2022-02-08 DIAGNOSIS — Z11.59 NEED FOR HEPATITIS C SCREENING TEST: ICD-10-CM

## 2022-02-08 DIAGNOSIS — Z11.4 SCREENING FOR HIV (HUMAN IMMUNODEFICIENCY VIRUS): ICD-10-CM

## 2022-02-08 PROCEDURE — 99214 OFFICE O/P EST MOD 30 MIN: CPT | Performed by: FAMILY MEDICINE

## 2022-02-08 RX ORDER — MECLIZINE HYDROCHLORIDE 25 MG/1
25 TABLET ORAL 3 TIMES DAILY PRN
Qty: 30 TABLET | Refills: 1 | Status: SHIPPED | OUTPATIENT
Start: 2022-02-08

## 2022-02-08 NOTE — ASSESSMENT & PLAN NOTE
Patient's vertigo is getting worse  Has been present since December  This is beyond the 6 week normal course for vertigo  Needs MRI with internal auditory canals  Because she is also having headache pattern that is changing with that, and she was getting much better and then things got significantly worse again, would like to get the MRI stat

## 2022-02-08 NOTE — ASSESSMENT & PLAN NOTE
Patient is having significant increases in headaches recently  Neurology recommended MRI  Would recommend that she have the MRI stat  This is because she is continuing to have changes, as well as increasing vertigo

## 2022-02-08 NOTE — PROGRESS NOTES
Assessment and Plan:    Problem List Items Addressed This Visit     Migraine without aura and without status migrainosus, not intractable     Patient is having significant increases in headaches recently  Neurology recommended MRI  Would recommend that she have the MRI stat  This is because she is continuing to have changes, as well as increasing vertigo  Relevant Orders    MRI brain IAC wo and w contrast    Obesity     BMI is elevated  Follow-up in the future as needed  Limit carbs, increase exercise once vertigo is resolved  Vertigo - Primary     Patient's vertigo is getting worse  Has been present since December  This is beyond the 6 week normal course for vertigo  Needs MRI with internal auditory canals  Because she is also having headache pattern that is changing with that, and she was getting much better and then things got significantly worse again, would like to get the MRI stat  Relevant Medications    meclizine (ANTIVERT) 25 mg tablet    Other Relevant Orders    MRI brain IAC wo and w contrast    Worsening headaches     Per neurology note, MRI, but should be done stat  Relevant Orders    MRI brain IAC wo and w contrast      Other Visit Diagnoses     Need for hepatitis C screening test        Relevant Orders    Hepatitis C Antibody ([facility])    Screening for HIV (human immunodeficiency virus)        Relevant Orders    HIV 1/2 Antigen/Antibody (4th Generation) w Reflex [facility]    Screening for cervical cancer        Relevant Orders    Ambulatory referral to Obstetrics / Gynecology                 Diagnoses and all orders for this visit:    Vertigo  -     MRI brain IAC wo and w contrast; Future  -     meclizine (ANTIVERT) 25 mg tablet;  Take 1 tablet (25 mg total) by mouth 3 (three) times a day as needed for dizziness for up to 20 doses    Migraine without aura and without status migrainosus, not intractable  -     MRI brain IAC wo and w contrast; Future    Worsening headaches  -     MRI brain IAC wo and w contrast; Future    Class 3 severe obesity due to excess calories without serious comorbidity with body mass index (BMI) of 45 0 to 49 9 in adult [facility])    Need for hepatitis C screening test  -     Hepatitis C Antibody ([facility]); Future    Screening for HIV (human immunodeficiency virus)  -     HIV 1/2 Antigen/Antibody (4th Generation) w Reflex [facility]; Future    Screening for cervical cancer  -     Ambulatory referral to Obstetrics / Gynecology; Future              Subjective:      Patient ID: Mary Funez is a 39 y o  female  CC:    Chief Complaint   Patient presents with    Dizziness     pt here with alot of vertigo, i had to help the patient walk to the exam room by hold her arm  R Go       HPI:    Prior treatment for vertigo  Was doing better  Sunday, symptoms restarted  Monday was severe  Today is a bit better than Monday, but still not normal   She is feeling quite dizzy with this  Has been in PT for this so far  The following portions of the patient's history were reviewed and updated as appropriate: allergies, current medications, past family history, past medical history, past social history, past surgical history and problem list       Review of Systems      Data to review:       Objective:    Vitals:    02/08/22 1354   BP: 114/72   BP Location: Left arm   Patient Position: Sitting   Cuff Size: Large   Pulse: 88   Resp: 16   SpO2: 99%   Weight: 122 kg (270 lb)   Height: 5' 3" (1 6 m)        Physical Exam  Vitals and nursing note reviewed  Constitutional:       General: She is not in acute distress  Appearance: She is well-developed  She is not ill-appearing  HENT:      Head: Normocephalic and atraumatic  Right Ear: Tympanic membrane, ear canal and external ear normal       Left Ear: Tympanic membrane, ear canal and external ear normal    Eyes:      General: No scleral icterus  Right eye: No discharge  Left eye: No discharge  Conjunctiva/sclera: Conjunctivae normal       Pupils: Pupils are equal, round, and reactive to light  Cardiovascular:      Rate and Rhythm: Normal rate and regular rhythm  Pulses: Normal pulses  Heart sounds: No murmur heard  No friction rub  No gallop  Pulmonary:      Effort: Pulmonary effort is normal       Breath sounds: Normal breath sounds  Abdominal:      General: Abdomen is flat  There is no distension  Palpations: There is no mass  Tenderness: There is no abdominal tenderness  There is no right CVA tenderness, left CVA tenderness, guarding or rebound  Hernia: No hernia is present  Musculoskeletal:         General: Normal range of motion  Cervical back: Normal range of motion and neck supple  Lymphadenopathy:      Cervical: No cervical adenopathy  Skin:     General: Skin is warm and dry  Coloration: Skin is not jaundiced  Findings: No bruising  Neurological:      General: No focal deficit present  Mental Status: She is alert and oriented to person, place, and time  Mental status is at baseline  Cranial Nerves: No cranial nerve deficit  Sensory: No sensory deficit  Motor: No weakness  Deep Tendon Reflexes: Reflexes normal       Comments: Unable to test coordination and gait due to dizziness  Patient is sitting in the chair, and I observed that she is swaying back and forth  Not able to do full extraocular movements secondary to potential for severe dizziness  Psychiatric:         Mood and Affect: Mood normal          Behavior: Behavior normal          Thought Content:  Thought content normal          Judgment: Judgment normal

## 2022-02-08 NOTE — ASSESSMENT & PLAN NOTE
BMI is elevated  Follow-up in the future as needed  Limit carbs, increase exercise once vertigo is resolved

## 2022-02-08 NOTE — PATIENT INSTRUCTIONS
Problem List Items Addressed This Visit     Migraine without aura and without status migrainosus, not intractable     Patient is having significant increases in headaches recently  Neurology recommended MRI  Would recommend that she have the MRI stat  This is because she is continuing to have changes, as well as increasing vertigo  Relevant Orders    MRI brain IAC wo and w contrast    Obesity     BMI is elevated  Follow-up in the future as needed  Limit carbs, increase exercise once vertigo is resolved  Vertigo - Primary     Patient's vertigo is getting worse  Has been present since December  This is beyond the 6 week normal course for vertigo  Needs MRI with internal auditory canals  Because she is also having headache pattern that is changing with that, and she was getting much better and then things got significantly worse again, would like to get the MRI stat  Relevant Medications    meclizine (ANTIVERT) 25 mg tablet    Other Relevant Orders    MRI brain IAC wo and w contrast    Worsening headaches     Per neurology note, MRI, but should be done stat  Relevant Orders    MRI brain IAC wo and w contrast      Other Visit Diagnoses     Need for hepatitis C screening test        Relevant Orders    Hepatitis C Antibody ([facility])    Screening for HIV (human immunodeficiency virus)        Relevant Orders    HIV 1/2 Antigen/Antibody (4th Generation) w Reflex [facility]    Screening for cervical cancer        Relevant Orders    Ambulatory referral to Obstetrics / Gynecology          COVID 19 Instructions    Dom Suni was advised to limit contact with others to essential tasks such as getting food, medications, and medical care  Proper handwashing reviewed, and Hand sanitzer when washing is not available  If the patient develops symptoms of COVID 19, the patient should call the office as soon as possible      For 6946-9329 Flu season, it is strongly recommended that Flu Vaccinations be obtained  Virtual Visits:  Arsenio: This works on smart phones (any phone with Internet browsing capability)  You should get a text message when the provider is ready to see you  Click on the link in the text message, and the call should start  You will need to type in your name, and allow camera and microphone access  This is HIPPA compliant, and secure  If you have not already done so, get immunized to COVID 19  We are committed to getting you vaccinated as soon as possible and will be closely following CDC and [facility]  guidelines as they are released and revised  Please refer to our COVID-19 vaccine webpage for the most up to date information on the vaccine and our distribution efforts  This site will also have the most up to date recommendations for COVID booster vaccine  MagaliherRadhika rousseau    Call [phone] ([phone]), option 7    OUR NEW LOCATION:    [address], [address] [place], [place] [address], [place], [address]  Fax: [phone]    Lab services and OB/GYN are at this location as well

## 2022-02-09 ENCOUNTER — HOSPITAL ENCOUNTER (OUTPATIENT)
Dept: MRI IMAGING | Facility: HOSPITAL | Age: 37
Discharge: HOME | End: 2022-02-09
Payer: COMMERCIAL

## 2022-02-09 DIAGNOSIS — R51.9 WORSENING HEADACHES: ICD-10-CM

## 2022-02-09 DIAGNOSIS — R42 VERTIGO: ICD-10-CM

## 2022-02-09 DIAGNOSIS — G43.009 MIGRAINE WITHOUT AURA AND WITHOUT STATUS MIGRAINOSUS, NOT INTRACTABLE: ICD-10-CM

## 2022-02-09 PROCEDURE — A9585 GADOBUTROL INJECTION: HCPCS | Performed by: FAMILY MEDICINE

## 2022-02-09 PROCEDURE — G1004 CDSM NDSC: HCPCS

## 2022-02-09 PROCEDURE — 70553 MRI BRAIN STEM W/O & W/DYE: CPT

## 2022-02-09 RX ADMIN — GADOBUTROL 12 ML: 604.72 INJECTION INTRAVENOUS at 07:47

## 2022-02-15 ENCOUNTER — TELEPHONE (OUTPATIENT)
Dept: NEUROLOGY | Facility: CLINIC | Age: 37
End: 2022-02-15

## 2022-02-15 NOTE — TELEPHONE ENCOUNTER
Hi!    This is a patient of [facility]  She is currently scheduled for follow up with Dr Matias Mir  Perhaps, this apt should of have been with [place]

## 2022-02-16 ENCOUNTER — RA CDI HCC (OUTPATIENT)
Dept: OTHER | Facility: HOSPITAL | Age: 37
End: 2022-02-16

## 2022-02-16 NOTE — PROGRESS NOTES
[facility]  coding opportunities       Chart reviewed, no opportunity found: CHART REVIEWED, NO OPPORTUNITY FOUND                        Patients insurance company: [employer] (Medicare Advantage and Commercial)

## 2022-02-22 ENCOUNTER — OFFICE VISIT (OUTPATIENT)
Dept: FAMILY MEDICINE CLINIC | Facility: CLINIC | Age: 37
End: 2022-02-22
Payer: COMMERCIAL

## 2022-02-22 VITALS
HEART RATE: 76 BPM | DIASTOLIC BLOOD PRESSURE: 76 MMHG | HEIGHT: 63 IN | BODY MASS INDEX: 48.9 KG/M2 | SYSTOLIC BLOOD PRESSURE: 130 MMHG | WEIGHT: 276 LBS

## 2022-02-22 DIAGNOSIS — R51.9 ACUTE INTRACTABLE HEADACHE, UNSPECIFIED HEADACHE TYPE: ICD-10-CM

## 2022-02-22 DIAGNOSIS — G43.009 MIGRAINE WITHOUT AURA AND WITHOUT STATUS MIGRAINOSUS, NOT INTRACTABLE: ICD-10-CM

## 2022-02-22 DIAGNOSIS — S09.90XD INJURY OF HEAD, SUBSEQUENT ENCOUNTER: ICD-10-CM

## 2022-02-22 DIAGNOSIS — R42 VERTIGO: Primary | ICD-10-CM

## 2022-02-22 PROCEDURE — 1036F TOBACCO NON-USER: CPT | Performed by: FAMILY MEDICINE

## 2022-02-22 PROCEDURE — 3008F BODY MASS INDEX DOCD: CPT | Performed by: FAMILY MEDICINE

## 2022-02-22 PROCEDURE — 99214 OFFICE O/P EST MOD 30 MIN: CPT | Performed by: FAMILY MEDICINE

## 2022-02-22 NOTE — ASSESSMENT & PLAN NOTE
Patient is having significant amount of symptoms with regard to vertigo  It had gotten better, but now is coming back  The MRI did not show any specific causes for vertigo  Would recommend ENT evaluation, as well as continuing with PT as tolerable  Certainly, some of this could be related to migraines, so she does need to talk with Neurology about that as well

## 2022-02-22 NOTE — PROGRESS NOTES
Assessment and Plan:    Problem List Items Addressed This Visit     Head injury     Patient has been doing well, but has daily HA  Seeing Neuro  No changes  Headache     Post head injury in past  Recommend neuro follow up  Migraine without aura and without status migrainosus, not intractable     Rare migraine HA, but has background irritation frequently  Vertigo - Primary     Patient is having significant amount of symptoms with regard to vertigo  It had gotten better, but now is coming back  The MRI did not show any specific causes for vertigo  Would recommend ENT evaluation, as well as continuing with PT as tolerable  Certainly, some of this could be related to migraines, so she does need to talk with Neurology about that as well  Relevant Orders    Ambulatory Referral to Otolaryngology                 Diagnoses and all orders for this visit:    Vertigo  -     Ambulatory Referral to Otolaryngology; Future    Migraine without aura and without status migrainosus, not intractable    Acute intractable headache, unspecified headache type    Injury of head, subsequent encounter              Subjective:      Patient ID: Brayan Tam is a 39 y o  female  CC:    Chief Complaint   Patient presents with    Follow-up     patient is here for a 2 week f/u re: dizziness  Patient states its not as bad, has bad days 3-4 days/week, but still has dizziness every day  ak       HPI:    Patient is here to follow-up on vertigo  Per chief complaint, she is continuing to have daily symptoms  She did have MRI completed  Patient does have a Neurology follow-up coming in June  Migraine headaches:  Patient does have follow-up with Neurology as above  Unfortunately, she is not getting anywhere as far as headaches  Currently tracking background level of headache  Again, she is following with Neurology  Vertigo:  Patient is usually having symptoms of vertigo daily    She describes it as a 2/10 most times, but it has gotten up as bad as a 5 or 6/10  The following portions of the patient's history were reviewed and updated as appropriate: allergies, current medications, past family history, past medical history, past social history, past surgical history and problem list       Review of Systems   Constitutional: Negative  HENT: Negative  Respiratory: Negative  Cardiovascular: Negative  Neurological: Positive for dizziness and headaches  Data to review:   MRI brain and IAC's:  Normal     Objective:    Vitals:    02/22/22 1415   BP: 130/76   Pulse: 76   Weight: 125 kg (276 lb)   Height: 5' 3" (1 6 m)        Physical Exam  Vitals and nursing note reviewed  Constitutional:       Appearance: Normal appearance  HENT:      Head: Normocephalic  Cardiovascular:      Rate and Rhythm: Normal rate and regular rhythm  Pulses: Normal pulses  Carotid pulses are 2+ on the right side and 2+ on the left side  Heart sounds: Normal heart sounds  No murmur heard  No friction rub  No gallop  Pulmonary:      Effort: Pulmonary effort is normal  No respiratory distress  Breath sounds: Normal breath sounds  No stridor  No wheezing, rhonchi or rales  Chest:      Chest wall: No tenderness  Neurological:      Mental Status: She is alert  BMI Counseling: Body mass index is 48 89 kg/m²  The BMI is above normal  Nutrition recommendations include decreasing portion sizes, encouraging healthy choices of fruits and vegetables, decreasing fast food intake, consuming healthier snacks, limiting drinks that contain sugar, moderation in carbohydrate intake, increasing intake of lean protein, reducing intake of saturated and trans fat and reducing intake of cholesterol  Exercise recommendations include exercising 3-5 times per week  No pharmacotherapy was ordered  Rationale for BMI follow-up plan is due to patient being overweight or obese

## 2022-02-22 NOTE — PATIENT INSTRUCTIONS
Problem List Items Addressed This Visit     Head injury     Patient has been doing well, but has daily HA  Seeing Neuro  No changes  Headache     Post head injury in past  Recommend neuro follow up  Migraine without aura and without status migrainosus, not intractable     Rare migraine HA, but has background irritation frequently  Vertigo - Primary     Patient is having significant amount of symptoms with regard to vertigo  It had gotten better, but now is coming back  The MRI did not show any specific causes for vertigo  Would recommend ENT evaluation, as well as continuing with PT as tolerable  Certainly, some of this could be related to migraines, so she does need to talk with Neurology about that as well  Relevant Orders    Ambulatory Referral to Otolaryngology          COVID 19 Instructions    Jennifer Pastrana was advised to limit contact with others to essential tasks such as getting food, medications, and medical care  Proper handwashing reviewed, and Hand sanitzer when washing is not available  If the patient develops symptoms of COVID 19, the patient should call the office as soon as possible  For 2155-9102 Flu season, it is strongly recommended that Flu Vaccinations be obtained  Virtual Visits:  Arsenio: This works on smart phones (any phone with Internet browsing capability)  You should get a text message when the provider is ready to see you  Click on the link in the text message, and the call should start  You will need to type in your name, and allow camera and microphone access  This is HIPPA compliant, and secure  If you have not already done so, get immunized to COVID 19  We are committed to getting you vaccinated as soon as possible and will be closely following CDC and [facility]  guidelines as they are released and revised    Please refer to our COVID-19 vaccine webpage for the most up to date information on the vaccine and our distribution efforts  This site will also have the most up to date recommendations for COVID booster vaccine  Chio rousseau    Call [phone] ([phone]), option 7    OUR NEW LOCATION:    [address], [facility] [address], [place], [address]  Fax: [phone]    Lab services and OB/GYN are at this location as well

## 2022-02-23 ENCOUNTER — TELEPHONE (OUTPATIENT)
Dept: FAMILY MEDICINE CLINIC | Facility: CLINIC | Age: 37
End: 2022-02-23

## 2022-02-23 NOTE — TELEPHONE ENCOUNTER
Patient needs a 4 week f/u with [place]  Patient is currently scheduled with Dr Freda Stevenson  Please assist in rescheduling patient to Mary's schedule  Thank you!     Candice

## 2022-02-23 NOTE — TELEPHONE ENCOUNTER
PATIENT'S MRI IS BEING DENIED, PLEASE ADVISE IF DR Emelyn Mcqueen WILL DO [id number]    [facility]  Can you please see if the dr can do a P2P on this STAT denial case# [id number]       Your doctor's request for a(n) Brain MRI has been denied  · Your doctor asked for coverage for you to have a(n) Brain MRI to treat the following problem that you have: dizziness  · This request was denied because all the clinical notes needed to make a decision were not sent  · The following notes were requested but have not been sent: doctor's notes that say the reason this test is being done again so soon  · [facility] Clinical Guideline 001 for Brain (head) MRI was used to make this decision  Therefore, Brain MRI is not covered by the [facility]  The contract governing benefit coverage under the Member's health care plan contains a schedule or list of exclusions which provides, in part, that no benefits are provided under this coverage with [email or web address] for services, supplies, or equipment described or [id number] which are not medically necessary as determined by [employer]) or his/her designee(s)  If your treating physician would like to discuss this case with a board certified Elias Abraham physician reviewer you may call JER at [phone]

## 2022-02-23 NOTE — TELEPHONE ENCOUNTER
So, to be clear on this, it appears that [facility] did not send the notes? In the note, it clearly state what was going on  This was related to prolonged episode of benign positional vertigo

## 2022-02-24 NOTE — TELEPHONE ENCOUNTER
Dr Alyssa Conti, I reached out to Ed Pinzon who tried for the Auth and she did send information needed as documented below  Are you willing to do this Peer to Peer so her MRI from 2/9/2022 can be covered? Please advise    [9:08 AM] Carmelita Pain  Good morning, I sent the OV and test results from your dr , i also updated her PT eval and Neuro visit and a previous CTA of the head she had done  2/9 is the test that was denied and that i am asking the p2p be done  [9:09 AM] Carmelita Pain  but i did upload those result from 2/9 hoping that would help but the plan still denied it

## 2022-02-24 NOTE — TELEPHONE ENCOUNTER
I spoke to Pat Tipton and she reached out to the insurance again and she is going to resubmit the request as they were running this off the wrong order  P2P not needed currently

## 2022-03-09 NOTE — TELEPHONE ENCOUNTER
Called and spoke to patient - she is scheduled and confirmed to see Dr Mervat Salter on 3/29/22 at 8:30am in [facility] location  Tried to reschedule patient's apt with [place] to a sooner date  I was not able to find a sooner apt to reschedule patient  I informed her that Vika Adjutant will call her to reschedule her June apt to a sooner date

## 2022-03-09 NOTE — TELEPHONE ENCOUNTER
Problem: SELF HARM/SUICIDALITY  Goal: Will have no self-injury during hospital stay  Description  INTERVENTIONS:  - Q 15 MINUTES: Routine safety checks  - Q WAKING SHIFT & PRN: Assess risk to determine if routine checks are adequate to maintain patient safety  - Encourage patient to participate actively in care by formulating a plan to combat response to suicidal ideation, identify supports and resources  Outcome: Progressing     Problem: DEPRESSION  Goal: Will be euthymic at discharge  Description  INTERVENTIONS:  - Administer medication as ordered  - Provide emotional support via 1:1 interaction with staff  - Encourage involvement in milieu/groups/activities  - Monitor for social isolation  Outcome: Progressing     Problem: ANXIETY  Goal: Will report anxiety at manageable levels  Description  INTERVENTIONS:  - Administer medication as ordered  - Teach and encourage coping skills  - Provide emotional support  - Assess patient/family for anxiety and ability to cope  Outcome: Progressing  Goal: By discharge: Patient will verbalize 2 strategies to deal with anxiety  Description  Interventions:  - Identify any obvious source/trigger to anxiety  - Staff will assist patient in applying identified coping technique/skills  - Encourage attendance of scheduled groups and activities  Outcome: Progressing     Problem: Ineffective Coping  Goal: Participates in unit activities  Description  Interventions:  - Provide therapeutic environment   - Provide required programming   - Redirect inappropriate behaviors   Outcome: Progressing This patient NEEDS to see Dr Matias Mir in a 60 minute spot for discussion of her previous concussions and headaches  Please assist in finding her a spot before she follows up with me  I'd like her seen in the next 3-5 weeks

## 2022-03-10 NOTE — TELEPHONE ENCOUNTER
Called patient and I left a message making her aware that I will place her appointment on a waitlist to bring in

## 2022-03-17 ENCOUNTER — APPOINTMENT (EMERGENCY)
Dept: CT IMAGING | Facility: HOSPITAL | Age: 37
End: 2022-03-17
Payer: COMMERCIAL

## 2022-03-17 ENCOUNTER — HOSPITAL ENCOUNTER (EMERGENCY)
Facility: HOSPITAL | Age: 37
Discharge: HOME | End: 2022-03-17
Attending: EMERGENCY MEDICINE
Payer: COMMERCIAL

## 2022-03-17 VITALS
HEIGHT: 63 IN | OXYGEN SATURATION: 100 % | TEMPERATURE: 98.4 F | HEART RATE: 68 BPM | RESPIRATION RATE: 18 BRPM | SYSTOLIC BLOOD PRESSURE: 112 MMHG | BODY MASS INDEX: 47.66 KG/M2 | WEIGHT: 268.96 LBS | DIASTOLIC BLOOD PRESSURE: 68 MMHG

## 2022-03-17 DIAGNOSIS — S09.90XA INJURY OF HEAD, INITIAL ENCOUNTER: Primary | ICD-10-CM

## 2022-03-17 PROCEDURE — 99284 EMERGENCY DEPT VISIT MOD MDM: CPT | Performed by: EMERGENCY MEDICINE

## 2022-03-17 PROCEDURE — 96365 THER/PROPH/DIAG IV INF INIT: CPT

## 2022-03-17 PROCEDURE — 70450 CT HEAD/BRAIN W/O DYE: CPT

## 2022-03-17 PROCEDURE — 99284 EMERGENCY DEPT VISIT MOD MDM: CPT

## 2022-03-17 PROCEDURE — 96375 TX/PRO/DX INJ NEW DRUG ADDON: CPT

## 2022-03-17 RX ORDER — MAGNESIUM SULFATE HEPTAHYDRATE 40 MG/ML
2 INJECTION, SOLUTION INTRAVENOUS ONCE
Status: COMPLETED | OUTPATIENT
Start: 2022-03-17 | End: 2022-03-17

## 2022-03-17 RX ORDER — METOCLOPRAMIDE HYDROCHLORIDE 5 MG/ML
10 INJECTION INTRAMUSCULAR; INTRAVENOUS ONCE
Status: COMPLETED | OUTPATIENT
Start: 2022-03-17 | End: 2022-03-17

## 2022-03-17 RX ORDER — DIPHENHYDRAMINE HYDROCHLORIDE 50 MG/ML
25 INJECTION INTRAMUSCULAR; INTRAVENOUS ONCE
Status: COMPLETED | OUTPATIENT
Start: 2022-03-17 | End: 2022-03-17

## 2022-03-17 RX ORDER — KETOROLAC TROMETHAMINE 30 MG/ML
15 INJECTION, SOLUTION INTRAMUSCULAR; INTRAVENOUS ONCE
Status: COMPLETED | OUTPATIENT
Start: 2022-03-17 | End: 2022-03-17

## 2022-03-17 RX ADMIN — MAGNESIUM SULFATE 2 G: 2 INJECTION INTRAVENOUS at 12:54

## 2022-03-17 RX ADMIN — KETOROLAC TROMETHAMINE 15 MG: 30 INJECTION, SOLUTION INTRAMUSCULAR at 12:54

## 2022-03-17 RX ADMIN — DIPHENHYDRAMINE HYDROCHLORIDE 25 MG: 50 INJECTION, SOLUTION INTRAMUSCULAR; INTRAVENOUS at 10:52

## 2022-03-17 RX ADMIN — METOCLOPRAMIDE 10 MG: 5 INJECTION, SOLUTION INTRAMUSCULAR; INTRAVENOUS at 10:53

## 2022-03-17 NOTE — Clinical Note
Rakan Burks was seen and treated in our emergency department on 3/17/2022  Diagnosis:     Thor Aranda    She may return on this date:     Pt was seen and evaluated in the ED on 3/17, if she still has headaches, dizziness and nausea tomorrow she is restricted from returning to work until 3/21     If you have any questions or concerns, please don't hesitate to call        Susanne    ______________________________           _______________          _______________  Hospital Representative                              Date                                Time

## 2022-03-17 NOTE — ED PROVIDER NOTES
History  Chief Complaint   Patient presents with    Fall     Pt reports falling this morning and hittign R side of head on end table  Pt reports dizziness, nausea     A 78-year-old female with past medical history significant for vertigo and prior concussions; presents with a headache, dizziness and nausea after striking her head earlier this morning  Patient states she was seated on her couch, when the back of the couch collapsed causing her to strike the right side of her head  Patient denies loss of consciousness  Patient states since the fall she has had progressively worsening room spinning dizziness and a generalized headache  Headache is associated with photophobia and phonophobia  Patient has nausea but denies vomiting  Patient also reports spasming to the right upper extremity with some paresthesias, however states this is improving  Patient otherwise denies visual disturbance, neck pain, back pain, chest pain, shortness of breath, abdominal pain, diarrhea, peripheral edema, rashes and focal weakness  Patient states that she does get daily headaches, however today's headache is more severe  Patient also reports the dizziness is more severe than her baseline  She has not taken anything prior to arrival   A/P:  Head injury, now with headache and dizziness  Patient is neurologically intact  Suspect recurrent concussive symptoms  Will obtain CT head to evaluate for underlying intracranial injury  Will treat symptomatically  History provided by:  Patient, medical records and parent  Fall  Associated symptoms: headaches and nausea        Prior to Admission Medications   Prescriptions Last Dose Informant Patient Reported?  Taking?   meclizine (ANTIVERT) 25 mg tablet Unknown at Unknown time  No No   Sig: Take 1 tablet (25 mg total) by mouth 3 (three) times a day as needed for dizziness for up to 20 doses      Facility-Administered Medications: None       Past Medical History:   Diagnosis Date    Edema of left lower extremity 7/12/2021    TBI (traumatic brain injury) [facility])     [id number] stated by patient       Past Surgical History:   Procedure Laterality Date    CHOLECYSTECTOMY      WISDOM TOOTH EXTRACTION         Family History   Problem Relation Age of Onset    Cancer Mother     Heart disease Father      I have reviewed and agree with the history as documented  E-Cigarette/Vaping    E-Cigarette Use Never User      E-Cigarette/Vaping Substances    Nicotine No     THC No     CBD No     Flavoring No     Other No     Unknown No      Social History     Tobacco Use    Smoking status: Never Smoker    Smokeless tobacco: Never Used   Vaping Use    Vaping Use: Never used   Substance Use Topics    Alcohol use: Never    Drug use: Never       Review of Systems   Gastrointestinal: Positive for nausea  Neurological: Positive for dizziness and headaches  All other systems reviewed and are negative  Physical Exam  Physical Exam  General Appearance: alert and oriented, nad, non toxic appearing  Skin:  Warm, dry, intact  HEENT: atraumatic, normocephalic  Neck: Supple, trachea midline  No midline cervical spine tenderness    Cardiac: RRR; no murmurs, rub, gallops  Pulmonary: lungs CTAB; no wheezes, rales, rhonchi  Gastrointestinal: abdomen soft, nontender, nondistended; no guarding or rebound tenderness; good bowel sounds, no mass or bruits  Extremities:  no pedal edema, 2+ pulses; no calf tenderness, no clubbing, no cyanosis  Neuro:  no focal motor or sensory deficits, CN 2-12 grossly intact  Psych:  Normal mood and affect, normal judgement and insight      Vital Signs  ED Triage Vitals [03/17/22 1013]   Temperature Pulse Respirations Blood Pressure SpO2   98 4 °F (36 9 °C) 86 18 144/88 98 %      Temp Source Heart Rate Source Patient Position - Orthostatic VS BP Location FiO2 (%)   Oral Monitor Sitting Right arm --      Pain Score       7           Vitals:    03/17/22 1013 03/17/22 1119 03/17/22 1249   BP: 144/88 135/75 112/68   Pulse: 86 79 68   Patient Position - Orthostatic VS: Sitting  Lying         Visual Acuity      ED Medications  Medications   metoclopramide (REGLAN) injection 10 mg (10 mg Intravenous Given 3/17/22 1053)   diphenhydrAMINE (BENADRYL) injection 25 mg (25 mg Intravenous Given 3/17/22 1052)   ketorolac (TORADOL) injection 15 mg (15 mg Intravenous Given 3/17/22 1254)   magnesium sulfate 2 g/50 mL IVPB (premix) 2 g (0 g Intravenous Stopped 3/17/22 1400)       Diagnostic Studies  Results Reviewed     None                 CT head without contrast   Final Result by Joshua Trejo MD (03/17 1226)      No acute intracranial abnormality  Workstation performed: [id number]                    Procedures  Procedures         ED Course  ED Course as of 03/17/22 1438   Thu Mar 17, 2022   1239 CT head without contrast  No acute intracranial abnormality  1239 Patient reports nausea has resolved  Headache is improving although still present  Updated on CT results  Likely concussive head injury given patient's history  Will give Toradol and magnesium now for better symptomatic treatment  1344 Pt feeling much better after toradol and magnesium  Will refer to comprehensive concussion program for further management  SBIRT 20yo+      Most Recent Value   SBIRT (22 yo +)    In order to provide better care to our patients, we are screening all of our patients for alcohol and drug use  Would it be okay to ask you these screening questions?  Unable to answer at this time Filed at: 03/17/2022 1025                    MDM    Disposition  Final diagnoses:   Injury of head, initial encounter     Time reflects when diagnosis was documented in both MDM as applicable and the Disposition within this note     Time User Action Codes Description Comment    3/17/2022  1:47 PM Rachel, [address] [S09 90XA] Injury of head, initial encounter       ED Disposition     ED Disposition Condition Date/Time Comment    Discharge Stable Thu Mar 17, 2022  1:47 PM Cande Aguirre discharge to home/self care              Follow-up Information     Follow up With Specialties Details Why Contact Info Additional Information    Ballard Bence, MD Family Medicine Schedule an appointment as soon as possible for a visit in 3 days For re-evaluation [address] [facility] [id number]  Car Hasnen [address] Emergency Department Emergency Medicine Go to  If symptoms worsen [facility] [id number] [address] Emergency Department, [address] [place] [address], [id number]          Discharge Medication List as of 3/17/2022  1:48 PM      CONTINUE these medications which have NOT CHANGED    Details   meclizine (ANTIVERT) 25 mg tablet Take 1 tablet (25 mg total) by mouth 3 (three) times a day as needed for dizziness for up to 20 doses, Starting Tue 2/8/2022, Normal                 PDMP Review     None          ED Provider  Electronically Signed by           Joe Licea DO  03/17/22 4009

## 2022-03-17 NOTE — DISCHARGE INSTRUCTIONS
You will receive a phone call from the Comprehensive Concussion Program to set up an evaluation for treatment

## 2022-03-21 ENCOUNTER — OFFICE VISIT (OUTPATIENT)
Dept: FAMILY MEDICINE CLINIC | Facility: CLINIC | Age: 37
End: 2022-03-21
Payer: COMMERCIAL

## 2022-03-21 VITALS
WEIGHT: 273.38 LBS | HEIGHT: 63 IN | DIASTOLIC BLOOD PRESSURE: 88 MMHG | BODY MASS INDEX: 48.44 KG/M2 | SYSTOLIC BLOOD PRESSURE: 108 MMHG | TEMPERATURE: 96.3 F

## 2022-03-21 DIAGNOSIS — G43.009 MIGRAINE WITHOUT AURA AND WITHOUT STATUS MIGRAINOSUS, NOT INTRACTABLE: ICD-10-CM

## 2022-03-21 DIAGNOSIS — S09.90XA INJURY OF HEAD, INITIAL ENCOUNTER: Primary | ICD-10-CM

## 2022-03-21 DIAGNOSIS — Z87.820 HISTORY OF MULTIPLE CONCUSSIONS: ICD-10-CM

## 2022-03-21 DIAGNOSIS — E66.01 CLASS 3 SEVERE OBESITY DUE TO EXCESS CALORIES WITHOUT SERIOUS COMORBIDITY WITH BODY MASS INDEX (BMI) OF 45.0 TO 49.9 IN ADULT (HCC): ICD-10-CM

## 2022-03-21 PROCEDURE — 3725F SCREEN DEPRESSION PERFORMED: CPT | Performed by: FAMILY MEDICINE

## 2022-03-21 PROCEDURE — 99214 OFFICE O/P EST MOD 30 MIN: CPT | Performed by: FAMILY MEDICINE

## 2022-03-21 NOTE — ASSESSMENT & PLAN NOTE
Vertigo is noted in the emergency room after her head injury  They put her on meclizine for that short-term  At this point, she is following with Neurology  She does have quite a bit of problems  Will need to continue to follow with Neurology

## 2022-03-21 NOTE — ASSESSMENT & PLAN NOTE
Patient does have multiple concussions  She has had several injuries previously, and has now had some worsening of her symptoms  It is quite severe at this point  As far as work is concerned, the patient would need to be out until such time as Neurology feels she is able to return  She will be seeing them in the next several days  I did ask that she have a referral to the concussion program as well

## 2022-03-21 NOTE — LETTER
March 21, 2022     Patient: Colette Covarrubias   YOB: 1985   Date of Visit: 3/21/2022       To Whom it May Concern:    Colette Covarrubias is under my professional care  She was seen in my office on 3/21/2022  She may return to work on when Released by neurology  If you have any questions or concerns, please don't hesitate to call           Sincerely,          Genevieve Perez MD        CC: No Recipients

## 2022-03-21 NOTE — PATIENT INSTRUCTIONS
Problem List Items Addressed This Visit     Head injury - Primary    Relevant Orders    Ambulatory Referral to Comprehensive Concussion Program    Ambulatory Referral to Neurology    History of multiple concussions     Patient does have multiple concussions  She has had several injuries previously, and has now had some worsening of her symptoms  It is quite severe at this point  As far as work is concerned, the patient would need to be out until such time as Neurology feels she is able to return  She will be seeing them in the next several days  I did ask that she have a referral to the concussion program as well  Relevant Orders    Ambulatory Referral to Comprehensive Concussion Program    Ambulatory Referral to Neurology    Migraine without aura and without status migrainosus, not intractable     Patient does have history of migraine  She has had headaches lately, but it is somewhat better, but not really since her accident  Follow with Neurology  Obesity     Stable  Given current situation with concussion, no changes recommended  COVID 19 Instructions    Mary Funez was advised to limit contact with others to essential tasks such as getting food, medications, and medical care  Proper handwashing reviewed, and Hand sanitzer when washing is not available  If the patient develops symptoms of COVID 19, the patient should call the office as soon as possible  For 0053-2913 Flu season, it is strongly recommended that Flu Vaccinations be obtained  Virtual Visits:  Arsenio: This works on smart phones (any phone with Internet browsing capability)  You should get a text message when the provider is ready to see you  Click on the link in the text message, and the call should start  You will need to type in your name, and allow camera and microphone access  This is HIPPA compliant, and secure  If you have not already done so, get immunized to COVID 19        We are committed to getting you vaccinated as soon as possible and will be closely following CDC and [facility]  guidelines as they are released and revised  Please refer to our COVID-19 vaccine webpage for the most up to date information on the vaccine and our distribution efforts  This site will also have the most up to date recommendations for COVID booster vaccine  Chio rousseau    Call [phone]), option 7    OUR NEW LOCATION:    [address], [address] [place] [address] [address], [place], [address]  Fax: [phone]    Lab services and OB/GYN are at this location as well

## 2022-03-21 NOTE — ASSESSMENT & PLAN NOTE
Patient does have history of migraine  She has had headaches lately, but it is somewhat better, but not really since her accident  Follow with Neurology

## 2022-03-21 NOTE — PROGRESS NOTES
Assessment and Plan:    Problem List Items Addressed This Visit     Head injury - Primary    Relevant Orders    Ambulatory Referral to Comprehensive Concussion Program    Ambulatory Referral to Neurology    History of multiple concussions     Patient does have multiple concussions  She has had several injuries previously, and has now had some worsening of her symptoms  It is quite severe at this point  As far as work is concerned, the patient would need to be out until such time as Neurology feels she is able to return  She will be seeing them in the next several days  I did ask that she have a referral to the concussion program as well  Relevant Orders    Ambulatory Referral to Comprehensive Concussion Program    Ambulatory Referral to Neurology    Migraine without aura and without status migrainosus, not intractable     Patient does have history of migraine  She has had headaches lately, but it is somewhat better, but not really since her accident  Follow with Neurology  Obesity     Stable  Given current situation with concussion, no changes recommended  Diagnoses and all orders for this visit:    Injury of head, initial encounter  -     Ambulatory Referral to Comprehensive Concussion Program; Future  -     Ambulatory Referral to Neurology; Future    Migraine without aura and without status migrainosus, not intractable    Class 3 severe obesity due to excess calories without serious comorbidity with body mass index (BMI) of 45 0 to 49 9 in [facility])    History of multiple concussions  -     Ambulatory Referral to Comprehensive Concussion Program; Future  -     Ambulatory Referral to Neurology; Future              Subjective:      Patient ID: Brayan Tam is a 44 y o  female      CC:    Chief Complaint   Patient presents with    Follow-up     pt was [facility] due to a fall, head injury on 3/17   mgb       HPI:    Patient is here to follow-up after recent ER visit   She had a head injury, and then had increasing headache, as well as some dizziness, migraine, distortion in vision  The vision changes had cleared almost immediately  Headache continues to be a bit of a problem  Visual disturbances continue to be a problem, such as computer screen  She did try to work today but was not able to continue to do that  Last head injury was 2019  Feels vice like pain in the forehead  Screen work causes nausea  The following portions of the patient's history were reviewed and updated as appropriate: allergies, current medications, past family history, past medical history, past social history, past surgical history and problem list       Review of Systems   Constitutional: Positive for fatigue  HENT: Negative  Eyes: Positive for photophobia  Respiratory: Negative  Cardiovascular: Negative  Musculoskeletal:        Balance issues  Neurological: Positive for dizziness and headaches  Data to review:       Objective:    Vitals:    03/21/22 1350   BP: 108/88   BP Location: Right arm   Patient Position: Sitting   Cuff Size: Large   Temp: (!) 96 3 °F (35 7 °C)   TempSrc: Temporal   Weight: 124 kg (273 lb 6 oz)   Height: 5' 3" (1 6 m)        Physical Exam  Vitals and nursing note reviewed  Constitutional:       General: She is not in acute distress  Appearance: She is well-developed  She is not ill-appearing  HENT:      Head: Normocephalic and atraumatic  Right Ear: Tympanic membrane, ear canal and external ear normal       Left Ear: Tympanic membrane, ear canal and external ear normal    Eyes:      General: No scleral icterus  Right eye: No discharge  Left eye: No discharge  Conjunctiva/sclera: Conjunctivae normal       Pupils: Pupils are equal, round, and reactive to light  Cardiovascular:      Rate and Rhythm: Normal rate and regular rhythm  Pulses: Normal pulses  Heart sounds: No murmur heard    No friction rub  No gallop  Pulmonary:      Effort: Pulmonary effort is normal       Breath sounds: Normal breath sounds  Abdominal:      General: Abdomen is flat  There is no distension  Palpations: There is no mass  Tenderness: There is no abdominal tenderness  There is no right CVA tenderness, left CVA tenderness, guarding or rebound  Hernia: No hernia is present  Musculoskeletal:         General: Normal range of motion  Cervical back: Normal range of motion and neck supple  Lymphadenopathy:      Cervical: No cervical adenopathy  Skin:     General: Skin is warm and dry  Coloration: Skin is not jaundiced  Findings: No bruising  Neurological:      General: No focal deficit present  Mental Status: She is alert and oriented to person, place, and time  Mental status is at baseline  Cranial Nerves: No cranial nerve deficit  Sensory: No sensory deficit  Motor: No weakness  Coordination: Coordination normal       Gait: Gait normal       Deep Tendon Reflexes: Reflexes normal    Psychiatric:         Mood and Affect: Mood normal          Behavior: Behavior normal          Thought Content: Thought content normal          Judgment: Judgment normal              Depression Screening and Follow-up Plan: Patient was screened for depression during today's encounter  They screened negative with a PHQ-2 score of 2

## 2022-03-24 ENCOUNTER — TELEPHONE (OUTPATIENT)
Dept: NEUROLOGY | Facility: CLINIC | Age: 37
End: 2022-03-24

## 2022-03-29 ENCOUNTER — TELEPHONE (OUTPATIENT)
Dept: FAMILY MEDICINE CLINIC | Facility: CLINIC | Age: 37
End: 2022-03-29

## 2022-03-29 ENCOUNTER — CONSULT (OUTPATIENT)
Dept: NEUROLOGY | Facility: CLINIC | Age: 37
End: 2022-03-29
Payer: COMMERCIAL

## 2022-03-29 ENCOUNTER — TELEPHONE (OUTPATIENT)
Dept: NEUROLOGY | Facility: CLINIC | Age: 37
End: 2022-03-29

## 2022-03-29 VITALS
BODY MASS INDEX: 46.78 KG/M2 | SYSTOLIC BLOOD PRESSURE: 129 MMHG | HEIGHT: 63 IN | DIASTOLIC BLOOD PRESSURE: 79 MMHG | HEART RATE: 88 BPM | WEIGHT: 264 LBS

## 2022-03-29 DIAGNOSIS — G43.009 MIGRAINE WITHOUT AURA AND WITHOUT STATUS MIGRAINOSUS, NOT INTRACTABLE: ICD-10-CM

## 2022-03-29 DIAGNOSIS — F43.10 POST-TRAUMATIC STRESS: ICD-10-CM

## 2022-03-29 DIAGNOSIS — Z87.820 HISTORY OF MULTIPLE CONCUSSIONS: ICD-10-CM

## 2022-03-29 DIAGNOSIS — G47.00 INSOMNIA: ICD-10-CM

## 2022-03-29 DIAGNOSIS — R06.83 SNORING: ICD-10-CM

## 2022-03-29 DIAGNOSIS — R51.9 CHRONIC DAILY HEADACHE: ICD-10-CM

## 2022-03-29 DIAGNOSIS — F44.7 FUNCTIONAL NEUROLOGICAL SYMPTOM DISORDER WITH MIXED SYMPTOMS: ICD-10-CM

## 2022-03-29 DIAGNOSIS — G44.311 INTRACTABLE ACUTE POST-TRAUMATIC HEADACHE: Primary | ICD-10-CM

## 2022-03-29 PROCEDURE — 3008F BODY MASS INDEX DOCD: CPT | Performed by: PSYCHIATRY & NEUROLOGY

## 2022-03-29 PROCEDURE — 99417 PROLNG OP E/M EACH 15 MIN: CPT | Performed by: PSYCHIATRY & NEUROLOGY

## 2022-03-29 PROCEDURE — 99215 OFFICE O/P EST HI 40 MIN: CPT | Performed by: PSYCHIATRY & NEUROLOGY

## 2022-03-29 PROCEDURE — 1036F TOBACCO NON-USER: CPT | Performed by: PSYCHIATRY & NEUROLOGY

## 2022-03-29 RX ORDER — RIZATRIPTAN BENZOATE 10 MG/1
10 TABLET ORAL ONCE AS NEEDED
Qty: 9 TABLET | Refills: 6 | Status: SHIPPED | OUTPATIENT
Start: 2022-03-29

## 2022-03-29 NOTE — TELEPHONE ENCOUNTER
I spoke to pt and let her know but she states Neuro did not tell her to be out but advised she contact PCP and let them know that going back to work at " half capacity" would help her heal  She states that this is not just all Headache issue but focus, impulse control, etc  She would like you to reconsider for all these reasons  Please advise

## 2022-03-29 NOTE — PROGRESS NOTES
Ana Baldwin called in because she said that the Neurologist recommend Short term dis and wants to know if Dr Katia Stein will complete the forms or does she need an appt to see him

## 2022-03-29 NOTE — TELEPHONE ENCOUNTER
MSW retrieved a list of in network psychologists with patient's insurance  MSW reached out to patient to inform of same  She requested MSW email her the list of in network providers  [email or web address]  MSW informed patient that when scheduling for new appointment, to confirm that provider still accepts insurance as this is subject to change  Patient acknowledged and understood  THE [facility]  Psychology  [address] ([email or web address])    [phone]    [facility] of [address]  Psychology  [address] [facility]  [place], [address] (GIHJBK)    [phone]    [facility]  Clinical Neuropsychology, Psychology  [address]  [place], [address] (KAUVUC)   [phone]    [address] [facility]  [address] [place], [address] [place])   [phone]    [facility]  Psychology  [address] [facility]  [address]  [place], [address] ([email or web address])    [phone]    [facility]  Clinical Psychology  [place] [address]  [place], [address] (TMBNEX)    [phone]    [facility]  Psychology  [address]  [place], [address] [place])    [phone]    [facility]  Psychology  [address] [facility], [address] [place])   [phone]    [address]  Psychology  [address] [facility] [address] [facility], [address] [place])   [phone]    Manuel Perez PhD [facility]  Psychology  [address]  [place], [address] ([email or web address])   [phone]      MSW will be available as needed if any further assistance is requested

## 2022-03-29 NOTE — PATIENT INSTRUCTIONS
If when you obtain your Vitamin B12 it is on the low end of normal, I recommend repletion below 400  I recommend adding over-the-counter vitamin B12 1000 mcg daily sublingual (better absorption than pill)    Will have our  reach out with a list of providers covered by your insurance    Referral to sleep medicine     Follow up with eye doctor for dilated eye exam     We dicussed gradual return to normalcy     Neurosymptoms  org - FreeCharge website for functional neurologic disorder       Headache/migraine treatment:   Abortive medications (for immediate treatment of a headache): It is ok to take ibuprofen, acetaminophen or naproxen (Advil, Tylenol,  Aleve, Excedrin) if they help your headaches you should limit these to No more than 3 times a week to avoid medication overuse/rebound headaches  For your more moderate to severe migraines take this medication early   Maxalt (rizatriptan) 10mg tabs - take one at the onset of headache  May repeat one time after 1-2 hours if pain has not resolved  (Max 2 a day and 9 a month)       Over the counter preventive supplements for headaches/migraines (if you try, try for 3 months straight)  (to take every day to help prevent headaches - not to take at the time of headache): There are combo pills online of these - none of which regulated by FDA and double check dosing - take appropriate dose only once a day- preventa migraine, migravent, mind ease, migrelief   [] Magnesium 400mg daily (If any diarrhea or upset stomach, decrease dose  as tolerated)  [] Riboflavin (Vitamin B2) 400mg daily - try online   (FYI B2 may make your urine bright/neon yellow)  AND/OR  [] Herbal medication: Petasites/Butterbur 150 mg daily - try online  (When choosing your Butterbur online or in the store, beware that there are some poor preps containing pyrrolizidine alkaloids (PAs) that can be harmful to the liver   Therefore, do not use butterbur products that are not labeled as PA-free )        Prescription preventive medications for headaches/migraines   (to take every day to help prevent headaches - not to take at the time of headache):  [x] we have options if you became interested       *Typically these types of medications take time untill you see the benefit, although some may see improvement in days, often it may take weeks, especially if the medication is being titrated up to a beneficial level  Please contact us if there are any concerns or questions regarding the medication  Lifestyle Recommendations:  [x] SLEEP - Maintain a regular sleep schedule: Adults need at least 7-8 hours of uninterrupted a night  Maintain good sleep hygiene:  Going to bed and waking up at consistent times, avoiding excessive daytime naps, avoiding caffeinated beverages in the evening, avoid excessive stimulation in the evening and generally using bed primarily for sleeping  One hour before bedtime would recommend turning lights down lower, decreasing your activity (may read quietly, listen to music at a low volume)  When you get into bed, should eliminate all technology (no texting, emailing, playing with your phone, iPad or tablet in bed)  [x] HYDRATION - Maintain good hydration  Drink  2L of fluid a day (4 typical small water bottles)  [x] DIET - Maintain good nutrition  In particular don't skip meals and try and eat healthy balanced meals regularly  [x] TRIGGERS - Look for other triggers and avoid them: Limit caffeine to 1-2 cups a day or less  Avoid dietary triggers that you have noticed bring on your headaches (this could include aged cheese, peanuts, MSG, aspartame and nitrates)  [x] EXERCISE - physical exercise as we all know is good for you in many ways, and not only is good for your heart, but also is beneficial for your mental health, cognitive health and  chronic pain/headaches  I would encourage at the least 5 days of physical exercise weekly for at least 30 minutes       Education and Follow-up  [x] Please call with any questions or concerns  Of course if any new concerning symptoms go to the emergency department    [x] Follow up 4-6 weeks sooner if needed

## 2022-03-29 NOTE — PROGRESS NOTES
[facility] Consult  PATIENT:  Krishna Estevez  MRN:  [id number]  :  1985  DATE OF SERVICE:  3/29/2022  REFERRED BY: Sandhya Norwood MD  PMD: Sejal Swanson MD    Assessment/Plan:     Krishna Estevez is a very pleasant  44 y o  female with a past medical history that includes migraine, BMI over 45, vertigo, B12 deficiency, vitamin-D deficiency, insomnia, anxiety, depression referred here for evaluation of headache  My initial evaluation 3/29/2022     Acute post-traumatic headache  History of possible concussion - resolved  Chronic daily headache  Migraine without aura and without status migrainosus, not intractable  Post-traumatic stress with some symptoms of functional neurologic disorder  We discussed her history of multiple possible concussions although we discussed it is also difficult to diagnose concussion definitively and how posttraumatic headache with migrainous features can often have similar presentation  Please see EMR and HPI for details  Most recently on 2022, she was seated on a couch when the back of the couch collapsed causing her to fall and hit the right side of her head on an end table she thinks, does not remember hitting her head, but had a red conchis at the right cheek area  She reports she does not remember a few seconds of time which we discussed can happen as a stress reaction are related to concussion and had progressively worsening up chronic vertigo and posttraumatic headache with migrainous features  She was evaluated emergency department where noncontrast head CT was unremarkable for acute pathology and she subsequently followed up with primary care provider who kept her out of work and asked her to follow up with Neurology    - As of 2022:  She reports following the injury vertigo actually got much better, she has chronic daily back on headaches for years dating back to at least 2020 that is worse about 1 day a week for which she is not interested in prescription preventative, did start trial of rizatriptan for abortive  She also has history of anxiety, depression and some symptoms of posttraumatic stress with functional neurologic disorder with hyper startle response, avoidance behavior and wearing sunglasses indoors, intrusion symptoms, negative impact on cognition and mood and arousal and reactivity changes  Also some symptoms of deconditioning and maladaptive coping  We discussed continue follow-up with PCP and recommended establishing care with a counselor for which I will have our  see if she can help her with a list   She was reassured by this information and we discussed gradual return to normalcy  Referral to sleep medicine for possible sleep apnea contributing to symptoms  Workup:  - Neurologic assessment reveals normal neurological exam except for depressed mood and affect, tearful, sunglasses indoors, hyper startle response to light touch, allodynia bifrontal forehead  - noncontrast head CT 03/04/2020: No acute intracranial abnormality   - CTA head and neck with without contrast 11/02/2021: Normal CT angiogram of the head and neck  - MRI brain IAC with without contrast 2/9/22: No IAC or CP angle pathology  Unremarkable MRI of the brain  - noncontrast head CT 03/17/2022: No acute intracranial abnormality  Preventative:  - we discussed headache hygiene and lifestyle factors that may improve headaches  - she is not interested in prescription preventative at this time although we discussed options and she may consider headache preventative supplements at least for 3 months trial  - Past/ failed/contraindicated:  None  - future options:  Amitriptyline, topiramate, CGRP med    Abortive:  - discussed not taking over-the-counter or prescription pain medications more than 3 days per week to prevent medication overuse/rebound headache  -     rizatriptan (MAXALT) 10 MG tablet;  Take 1 tablet (10 mg total) by mouth once as needed for migraine May repeat in 2 hours if needed  Max 2/24 hours, 9/month  Discussed proper use, possible side effects and risks  - Currently on through other providers:  Ibuprofen, meclizine  - Past/ failed/contraindicated:  OTC meds  - past:  Has tolerated steroids in the past, migraine cocktails have brought pain down  - future options: Alternative Triptan, prochlorperazine, Toradol IM or p o , could consider trial for 5 days of Depakote or dexamethasone for prolonged migraine, ubrelvy, reyvow, nurtec    We have discussed concussions and the natural course of recovery  We have discussed that symptoms from a concussion typically take 1-2 weeks to resolve, and although sometimes it can feel like concussion symptoms linger on, at this point these symptoms would be more likely related to contributing factors  - Contributing factors may include:   Post traumatic stress, Prolonged removal from normal routine,  posttraumatic headache,  comorbid injuries, preexisting chronic headaches or migraines,  medication overuse headache, anxiety or depression, stress, deconditioning,  comorbid medical diagnoses  - I have recommended gradual return of normal cognitive and physical activity with safety precautions  - We discussed that newer research regarding concussion shows that the sooner one returns gradually to their normal physical and cognitive routine, the sooner one tends to recover   Prolonged removal from normal routine and deconditioning have been shown to prolong symptoms and worsen symptoms   - We discussed that sometimes there is a constellation of symptoms that some refer to as "post concussion syndrome," but I prefer not to use this term since that can be misleading and make people think they are still brain injured or "concussed," when the most common and likely etiology this far out from the head trauma is either contributing factors or a form of functional neurologic disorder with mixed symptoms  - We discussed how cognitive issues can have multiple causes and often related to multifactorial etiologies including stress, anxiety,  mood, pain, hypervigilance  and sleep issues and provided reassurance that, it is not likely the cognitive dysfunction is related to concussion at this point    - Safe driving precautions, should not drive at all if feeling sleepy or cognitively not well  * We discussed the most likely therapy to help in these cases would be counseling    Insomnia, snoring, concern for [place]  -     Ambulatory referral to Sleep Medicine; Future    Patient instructions      If when you obtain your Vitamin B12 it is on the low end of normal, I recommend repletion below 400  I recommend adding over-the-counter vitamin B12 1000 mcg daily sublingual (better absorption than pill)    Will have our  reach out with a list of providers covered by your insurance    Referral to sleep medicine     Follow up with eye doctor for dilated eye exam     We dicussed gradual return to normalcy     Neurosymptoms  org - good website for functional neurologic disorder       Headache/migraine treatment:   Abortive medications (for immediate treatment of a headache): It is ok to take ibuprofen, acetaminophen or naproxen (Advil, Tylenol,  Aleve, Excedrin) if they help your headaches you should limit these to No more than 3 times a week to avoid medication overuse/rebound headaches  For your more moderate to severe migraines take this medication early   Maxalt (rizatriptan) 10mg tabs - take one at the onset of headache  May repeat one time after 1-2 hours if pain has not resolved  (Max 2 a day and 9 a month)       Over the counter preventive supplements for headaches/migraines (if you try, try for 3 months straight)  (to take every day to help prevent headaches - not to take at the time of headache):   There are combo pills online of these - none of which regulated by FDA and double check dosing - take appropriate dose only once a day- preventa migraine, migravent, mind ease, migrelief   [] Magnesium 400mg daily (If any diarrhea or upset stomach, decrease dose  as tolerated)  [] Riboflavin (Vitamin B2) 400mg daily - try online   (FYI B2 may make your urine bright/neon yellow)  AND/OR  [] Herbal medication: Petasites/Butterbur 150 mg daily - try online  (When choosing your Butterbur online or in the store, beware that there are some poor preps containing pyrrolizidine alkaloids (PAs) that can be harmful to the liver  Therefore, do not use butterbur products that are not labeled as PA-free )        Prescription preventive medications for headaches/migraines   (to take every day to help prevent headaches - not to take at the time of headache):  [x] we have options if you became interested       *Typically these types of medications take time untill you see the benefit, although some may see improvement in days, often it may take weeks, especially if the medication is being titrated up to a beneficial level  Please contact us if there are any concerns or questions regarding the medication  Lifestyle Recommendations:  [x] SLEEP - Maintain a regular sleep schedule: Adults need at least 7-8 hours of uninterrupted a night  Maintain good sleep hygiene:  Going to bed and waking up at consistent times, avoiding excessive daytime naps, avoiding caffeinated beverages in the evening, avoid excessive stimulation in the evening and generally using bed primarily for sleeping  One hour before bedtime would recommend turning lights down lower, decreasing your activity (may read quietly, listen to music at a low volume)  When you get into bed, should eliminate all technology (no texting, emailing, playing with your phone, iPad or tablet in bed)  [x] HYDRATION - Maintain good hydration  Drink  2L of fluid a day (4 typical small water bottles)  [x] DIET - Maintain good nutrition   In particular don't skip meals and try and eat healthy balanced meals regularly  [x] TRIGGERS - Look for other triggers and avoid them: Limit caffeine to 1-2 cups a day or less  Avoid dietary triggers that you have noticed bring on your headaches (this could include aged cheese, peanuts, MSG, aspartame and nitrates)  [x] EXERCISE - physical exercise as we all know is good for you in many ways, and not only is good for your heart, but also is beneficial for your mental health, cognitive health and  chronic pain/headaches  I would encourage at the least 5 days of physical exercise weekly for at least 30 minutes  Education and Follow-up  [x] Please call with any questions or concerns  Of course if any new concerning symptoms go to the emergency department  [x] Follow up 4-6 weeks sooner if needed        CC: We had the pleasure of evaluating Colette Covarrubias in neurological consultation today  Colette Covarrubias is a   left  handed female who presents today for evaluation of headaches  History obtained from patient as well as available medical record review  History of Present Illness:   Current medical illnesses  or past medical history include migraine, BMI over 45, vertigo, B12 deficiency, vitamin-D deficiency, insomnia, anxiety, depression       History of concussion  - 9th grade  States that she hit her head during indoor soccer and perhaps had a few seconds of LOC vs anmesia  - Most of 20s did not have medical insurance and had some hits to the head   - 2/29/2020 hit her head on her car  Didn't have LOC  Headaches, motion and light sensitivity started 20 minutes after  Next time lost balance   - 3/4/2020 when she was stepping out of her car, loss balance, did not hit head  Went to ER and then did therapy afterwards  Quit her job after this      - Patient started a job in August 2021 after being out of work for 1 5 years  She feels like this was over stimulating for her    She felt noise and light were overwhelming for her      - November of 2021 patient presented to her family doctor with a severe headache that was unrelenting for 1 and half weeks  Patient had stabbing on the left side of her head  dizziness and felt unsteady  She was sent to the emergency room for evaluation via ambulance  CTA of the head and neck was done in the emergency room which was negative   - Since the ER, states that she developed dizziness 1 week before José Manuel out of nowhere  working from home since 12/27/2021 due to her dizziness  - Does wear her sunglasses frequently during the day indoors  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 the max time without the glasses was 1 hour and then needed the entire rest of the day    States at home uses less, however she did place them on 2 times during our visit  - She has followed with Sports Medicine, physical therapy, primary care, saw my neurology colleague NORTH Brewer 02/02/2022  - went back to work after visit NORTH Brewer, was working from home until episode 3/17/22    On 03/17/2022 she was seated on a couch when the back of the couch collapsed causing her to fall and hit the right side of her head on an end table she thinks, does not remember hitting her head, but had a red conchis at the right cheek area  Acute symptoms included:  Does not remember a few seconds of time, unknown if LOC as unwitnessed, progressively worsening vertigo/room spinning and generalized headache with photophobia and phonophobia and nausea    - as of 3/29/2022: she continues to have chronic photophobia, migraines, but vertigo after the incident actually has now been better than prior       Mood:   - anxiety and depression in the past, worse later due to medical conditions   - post traumatic stress symptoms - hypervigilance, impact on cognition and mood, lack of emotional filter  Things at work are bad, HR said if no emotional filter you should not come back   Never worked with a counselor   No SI    Work  8-4  M-F  Out the past 2 weeks      Headaches started at what age? 20 years old  How often do the headaches occur?   - as of 3/29/2022: chronic daily background headaches for years since at least 3/2020, worse when vertigo kicked in Dec 2021 and 1 day a week above 3/10    Aura? without aura      Last eye exam: eye sight improving, followed a long time ago, 7 years ago     Where is your headache located and pain quality?   - bifrontal pressure like an iron band across forehead is most common and radiates back across the top of the scalp until bioccipital   - ice pick left parietal   What is the intensity of pain? Average: 1/10, worst 7/10  Associated symptoms:   [x] Nausea       [] Vomiting      [x] Photophobia     [x]Phonophobia      [] Osmophobia  [] Blurred vision   [x] Light-headed or dizzy - chronic     [] Tinnitus - not for prolonged periods and not severe  [] Hands or feet tingle or feel numb/paresthesias    [] Ptosis      [] Facial droop  [] Lacrimation  [] Nasal congestion/rhinorrhea        Things that make the headache worse? No specific movements, sometimes bending over    Headache triggers:  Lights, can be worse in am, stress, weather changes, unsure if hormonal     Have you seen someone else for headaches or pain? Yes many providers   Are you current pregnant or planning on getting pregnant? No plans, not active   Have you ever had any Brain imaging? yes    What medications do you take or have you taken for your headaches?    ABORTIVE:      Ibuprofen dulls it  Meclizine    Past  Something as needed in the past   Steroids in the past   Migraine cocktails in ED have helped bring pain down     PREVENTIVE:   -    MVI     Past/ failed/contraindicated:  -      LIFESTYLE  Sleep   - averages: 6-8 hours, snores, never had a sleep study  Problems falling asleep?:   Yes  Problems staying asleep?:  Yes, 1-4 for unknown reasons      Water: 6 cups per day  Caffeine: usually 1 soda per day          The following portions of the patient's history were reviewed and updated as appropriate: allergies, current medications, past family history, past medical history, past social history, past surgical history and problem list     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  Lives with cat      Past Medical History:     Past Medical History:   Diagnosis Date    Concussion     Edema of left lower extremity 7/12/2021    TBI (traumatic brain injury) ([facility] [address] )     [id number] stated by patient       Patient Active Problem List   Diagnosis    Obesity    Head injury    Headache    Vertigo    Worsening headaches    Migraine without aura and without status migrainosus, not intractable    B12 deficiency    Vitamin D deficiency    Snoring    History of multiple concussions       Medications:      Current Outpatient Medications   Medication Sig Dispense Refill    meclizine (ANTIVERT) 25 mg tablet Take 1 tablet (25 mg total) by mouth 3 (three) times a day as needed for dizziness for up to 20 doses (Patient not taking: Reported on 3/29/2022 ) 30 tablet 1     No current facility-administered medications for this visit          Allergies:    No Known Allergies    Family History:     Family History   Problem Relation Age of Onset   Kya Shrestha Cancer Mother     Heart disease Father        Social History:       Social History     Socioeconomic History    Marital status: Single     Spouse name: Not on file    Number of children: Not on file    Years of education: Not on file    Highest education level: Not on file   Occupational History    Not on file   Tobacco Use    Smoking status: Never Smoker    Smokeless tobacco: Never Used   Vaping Use    Vaping Use: Never used   Substance and Sexual Activity    Alcohol use: Never    Drug use: Never    Sexual activity: Not on file   Other Topics Concern    Not on file   Social History Narrative    CONSUMES 1 SODA PER DAY     Social Determinants of Health     Financial Resource Strain: Not on file   Food Insecurity: Not on file   Transportation Needs: Not on file   Physical Activity: Not on file   Stress: Not on file   Social Connections: Not on file   Intimate Partner Violence: Not on file   Housing Stability: Not on file         Objective:       Physical Exam:                                                                 Vitals:            Constitutional:    /79 (BP Location: Left arm, Patient Position: Sitting, Cuff Size: Standard)   Pulse 88   Ht 5' 3" (1 6 m)   Wt 120 kg (264 lb)   BMI 46 77 kg/m²   BP Readings from Last 3 Encounters:   03/29/22 129/79   03/21/22 108/88   03/17/22 112/68     Pulse Readings from Last 3 Encounters:   03/29/22 88   03/17/22 68   03/11/22 84         Well developed, well nourished, well groomed  No dysmorphic features  HEENT:  Normocephalic atraumatic  Oropharynx is clear and moist  No oral mucosal lesions  Chest:  Respirations regular and unlabored  Cardiovascular:  Distal extremities warm without palpable edema or tenderness, no observed significant swelling  Musculoskeletal:  (see below under neurologic exam for evaluation of motor function and gait)   Skin:  warm and dry, not diaphoretic  No apparent birthmarks or stigmata of neurocutaneous disease  Psychiatric:  Depressed mood and affect, hyperstartle  Response, sunglasses indoors        Neurological Examination:     Mental status/cognitive function:    Recent and remote memory intact  Attention span and concentration as well as fund of knowledge are appropriate for age  Normal language and spontaneous speech  Cranial Nerves:  II-visual fields full  Fundi poorly visualized due to pupillary constriction  III, IV, VI-Pupils were equal, round, and reactive to light and accomodation  Extraocular movements were full and conjugate without nystagmus  convergence 5 cm, conjugate gaze, normal smooth pursuits, normal saccades   V-facial sensation symmetric   allodynia bifrontal forhead  VII-facial expression symmetric, intact forehead wrinkle, strong eye closure, symmetric smile    VIII-hearing grossly intact bilaterally   IX, X-palate elevation symmetric, no dysarthria  XI-shoulder shrug strength intact    XII-tongue protrusion midline  Motor Exam: symmetric bulk and tone throughout, no pronator drift  Power/strength 5/5 bilateral upper and lower extremities, no atrophy, fasciculations or abnormal movements noted  Sensory: grossly intact light touch in all extremities  Reflexes: brachioradialis 2+, biceps 2+, knee 2+, ankle 2+ bilaterally  1-2 beats clonus  Coordination: Finger nose finger intact bilaterally, no apparent dysmetria, ataxia or tremor noted  Gait: steady casual gait  Pertinent lab results:   - routine labs ordered 02/08/2022 and not yet obtained  11/02/2021 CMP and CBC unremarkable  TSH normal     EKG 5/15/2014,  normal sinus rhythm, rate 80,     Imaging: I have personally reviewed imaging and radiology read   - noncontrast head CT 03/04/2020: No acute intracranial abnormality   - CTA head and neck with without contrast 11/02/2021: Normal CT angiogram of the head and neck  - MRI brain IAC with without contrast 2/9/22: No IAC or CP angle pathology  Unremarkable MRI of the brain  - noncontrast head CT 03/17/2022: No acute intracranial abnormality  Review of Systems:   ROS obtained by medical assistant Personally reviewed and updated if indicated  I recommended PCP follow up for non neurologic problems  Review of Systems   Constitutional: Negative  Negative for appetite change and fever  HENT: Negative  Negative for hearing loss, tinnitus, trouble swallowing and voice change  Eyes: Positive for light sensitivity  Negative for photophobia and pain  Respiratory: Negative  Negative for shortness of breath  Cardiovascular: Negative  Negative for palpitations  Gastrointestinal: Positive for nausea  Negative for vomiting  Endocrine: Negative  Negative for cold intolerance  Genitourinary: Negative  Negative for dysuria, frequency and urgency  Musculoskeletal: Negative  Negative for myalgias and neck pain  Skin: Negative  Negative for rash  Neurological: Positive for headaches  Negative for dizziness, tremors, seizures, syncope, facial asymmetry, speech difficulty, weakness, light-headedness and numbness  Hematological: Negative  Does not bruise/bleed easily  Psychiatric/Behavioral: Negative for confusion, hallucinations and sleep disturbance  The patient is nervous/anxious (anxiousness )  All other systems reviewed and are negative           I have spent 75 minutes with Patient today in which greater than 50% of this time was spent in counseling/coordination of care regarding Diagnostic results, Prognosis, Risks and benefits of tx options, Intructions for management, Patient and family education, Importance of tx compliance, Risk factor reductions and Impressions  I also spent 30 minutes non face to face for this patient the same day           Author:  Paty Lamb MD 3/29/2022 8:43 AM

## 2022-03-29 NOTE — TELEPHONE ENCOUNTER
Mateo Avila called in because she said that Dr Terrell Mendez wanted her to give him an update on her visit with the Neurologist and she said that neuro  recommends Short term dis and wants to know if Dr Terrell Mendez will complete the forms or does she need an appt to see him

## 2022-03-29 NOTE — TELEPHONE ENCOUNTER
I tried to review the note from Neurology, and did not see anything in specific about disability  If they are recommending that, they should be filling out paperwork for her

## 2022-03-29 NOTE — PROGRESS NOTES
Review of Systems   Constitutional: Negative  Negative for appetite change and fever  HENT: Negative  Negative for hearing loss, tinnitus, trouble swallowing and voice change  Eyes: Positive for visual disturbance (light sensitivity)  Negative for photophobia and pain  Respiratory: Negative  Negative for shortness of breath  Cardiovascular: Negative  Negative for palpitations  Gastrointestinal: Positive for nausea  Negative for vomiting  Endocrine: Negative  Negative for cold intolerance  Genitourinary: Negative  Negative for dysuria, frequency and urgency  Musculoskeletal: Negative  Negative for myalgias and neck pain  Skin: Negative  Negative for rash  Neurological: Positive for headaches  Negative for dizziness, tremors, seizures, syncope, facial asymmetry, speech difficulty, weakness, light-headedness and numbness  Hematological: Negative  Does not bruise/bleed easily  Psychiatric/Behavioral: Negative for confusion, hallucinations and sleep disturbance  The patient is nervous/anxious (anxiousness )  All other systems reviewed and are negative

## 2022-03-29 NOTE — TELEPHONE ENCOUNTER
----- Message from Thai Hunt MD sent at 3/29/2022  9:41 AM EDT -----  Could you please help this patient with a list of therapists or psychologists covered by their insurance?

## 2022-04-04 ENCOUNTER — TELEPHONE (OUTPATIENT)
Dept: FAMILY MEDICINE CLINIC | Facility: CLINIC | Age: 37
End: 2022-04-04

## 2022-04-04 ENCOUNTER — OFFICE VISIT (OUTPATIENT)
Dept: FAMILY MEDICINE CLINIC | Facility: CLINIC | Age: 37
End: 2022-04-04
Payer: COMMERCIAL

## 2022-04-04 VITALS
BODY MASS INDEX: 48.9 KG/M2 | HEIGHT: 63 IN | WEIGHT: 276 LBS | SYSTOLIC BLOOD PRESSURE: 120 MMHG | DIASTOLIC BLOOD PRESSURE: 74 MMHG | TEMPERATURE: 96.5 F

## 2022-04-04 DIAGNOSIS — F43.10 POST-TRAUMATIC STRESS: Primary | ICD-10-CM

## 2022-04-04 DIAGNOSIS — S09.90XD INJURY OF HEAD, SUBSEQUENT ENCOUNTER: ICD-10-CM

## 2022-04-04 DIAGNOSIS — G44.319 ACUTE POST-TRAUMATIC HEADACHE, NOT INTRACTABLE: ICD-10-CM

## 2022-04-04 PROCEDURE — 99214 OFFICE O/P EST MOD 30 MIN: CPT | Performed by: FAMILY MEDICINE

## 2022-04-04 NOTE — ASSESSMENT & PLAN NOTE
Patient is going to see neurology  This is likely the cause of some of the issues she is having  Seems to have caused posttraumatic headache, plus there is the question posttraumatic stress, leading to more emotional lability

## 2022-04-04 NOTE — TELEPHONE ENCOUNTER
Pt supervisor called in because she needs a letter explaining the limitations needed at work for the pt  And then refax it to [phone]    Thank you

## 2022-04-04 NOTE — PATIENT INSTRUCTIONS
Problem List Items Addressed This Visit     Acute post-traumatic headache     Continue to follow with Neurology  No change at the moment  Relevant Orders    Ambulatory Referral to [facility]    Ambulatory Referral to Social Work Care Management Program    Ambulatory Referral to Speech Therapy    Ambulatory Referral to Physical Therapy    Ambulatory Referral to Occupational Therapy    Head injury     Patient is going to see neurology  This is likely the cause of some of the issues she is having  Seems to have caused posttraumatic headache, plus there is the question posttraumatic stress, leading to more emotional lability  Relevant Orders    Ambulatory Referral to [facility]    Ambulatory Referral to Social Work Care Management Program    Ambulatory Referral to Speech Therapy    Ambulatory Referral to Physical Therapy    Ambulatory Referral to Occupational Therapy    Post-traumatic stress - Primary     She is having some issues still with PTSD, and some changes in memory and some aphasia  She wondered about having STD because of this  Should have psych follow up as well  Could be counseling  This is for the emotional component  I would agree that she would need to have reasonable accommodation for her posttraumatic stress issues, as well as the chronic headaches that that has entailed with it  She so far has used up her FMLA eligibility for this, and there does not appear to be a reasonable accommodation that she can do at this time  Because of this, the potential for short-term disability does come about  This is because she should have availability for changes in her work station and her work duties, but they are not able to accommodate that at the moment  I would support her in terms of short-term disability for perhaps 1 month, with re-evaluation by neurology as well  I would also recommend that she go to PT and OT and speech therapy             Relevant Orders    Ambulatory Referral to [facility]    Ambulatory Referral to Social Work Care Management Program    Ambulatory Referral to Speech Therapy    Ambulatory Referral to Physical Therapy    Ambulatory Referral to Occupational Therapy          COVID 19 Instructions    Joan Mendoza was advised to limit contact with others to essential tasks such as getting food, medications, and medical care  Proper handwashing reviewed, and Hand sanitzer when washing is not available  If the patient develops symptoms of COVID 19, the patient should call the office as soon as possible  For 3127-0336 Flu season, it is strongly recommended that Flu Vaccinations be obtained  Virtual Visits:  Arsenio: This works on smart phones (any phone with Internet browsing capability)  You should get a text message when the provider is ready to see you  Click on the link in the text message, and the call should start  You will need to type in your name, and allow camera and microphone access  This is HIPPA compliant, and secure  If you have not already done so, get immunized to COVID 19  We are committed to getting you vaccinated as soon as possible and will be closely following CDC and [facility]  guidelines as they are released and revised  Please refer to our COVID-19 vaccine webpage for the most up to date information on the vaccine and our distribution efforts  This site will also have the most up to date recommendations for COVID booster vaccine  [place]    Call [phone] ([phone]), option 7    OUR NEW LOCATION:    [address], [address] [place], [place] [address] [place], [place], [address]  Fax: [phone]    Lab services and OB/GYN are at this location as well

## 2022-04-04 NOTE — LETTER
April 4, 2022     Patient: April Martino   YOB: 1985   Date of Visit: 4/4/2022       To Whom it May Concern:    April Martino is under my professional care  She was seen in my office on 4/4/2022  She may return to work with limitations : changes due to TBI, and to post traumatic stress  Should be allowed accomidation  If not able, then should be out from work for about 1 month  If you have any questions or concerns, please don't hesitate to call           Sincerely,          Aiden Ferrell MD        CC: No Recipients

## 2022-04-04 NOTE — ASSESSMENT & PLAN NOTE
She is having some issues still with PTSD, and some changes in memory and some aphasia  She wondered about having STD because of this  Should have psych follow up as well  Could be counseling  This is for the emotional component  I would agree that she would need to have reasonable accommodation for her posttraumatic stress issues, as well as the chronic headaches that that has entailed with it  She so far has used up her FMLA eligibility for this, and there does not appear to be a reasonable accommodation that she can do at this time  Because of this, the potential for short-term disability does come about  This is because she should have availability for changes in her work station and her work duties, but they are not able to accommodate that at the moment  I would support her in terms of short-term disability for perhaps 1 month, with re-evaluation by neurology as well  I would also recommend that she go to PT and OT and speech therapy

## 2022-04-04 NOTE — PROGRESS NOTES
Assessment and Plan:    Problem List Items Addressed This Visit     Acute post-traumatic headache     Continue to follow with Neurology  No change at the moment  Relevant Orders    Ambulatory Referral to [facility]    Ambulatory Referral to Social Work Care Management Program    Ambulatory Referral to Speech Therapy    Ambulatory Referral to Physical Therapy    Ambulatory Referral to Occupational Therapy    Head injury     Patient is going to see neurology  This is likely the cause of some of the issues she is having  Seems to have caused posttraumatic headache, plus there is the question posttraumatic stress, leading to more emotional lability  Relevant Orders    Ambulatory Referral to [facility]    Ambulatory Referral to Social Work Care Management Program    Ambulatory Referral to Speech Therapy    Ambulatory Referral to Physical Therapy    Ambulatory Referral to Occupational Therapy    Post-traumatic stress - Primary     She is having some issues still with PTSD, and some changes in memory and some aphasia  She wondered about having STD because of this  Should have psych follow up as well  Could be counseling  This is for the emotional component  I would agree that she would need to have reasonable accommodation for her posttraumatic stress issues, as well as the chronic headaches that that has entailed with it  She so far has used up her FMLA eligibility for this, and there does not appear to be a reasonable accommodation that she can do at this time  Because of this, the potential for short-term disability does come about  This is because she should have availability for changes in her work station and her work duties, but they are not able to accommodate that at the moment  I would support her in terms of short-term disability for perhaps 1 month, with re-evaluation by neurology as well      I would also recommend that she go to PT and OT and speech therapy  Relevant Orders    Ambulatory Referral to [facility]    Ambulatory Referral to Social Work Care Management Program    Ambulatory Referral to Speech Therapy    Ambulatory Referral to Physical Therapy    Ambulatory Referral to Occupational Therapy                 Diagnoses and all orders for this visit:    Post-traumatic stress  -     Ambulatory Referral to [facility]; Future  -     Ambulatory Referral to Social Work Care Management Program; Future  -     Ambulatory Referral to Speech Therapy; Future  -     Ambulatory Referral to Physical Therapy; Future  -     Ambulatory Referral to Occupational Therapy; Future    Acute post-traumatic headache, not intractable  -     Ambulatory Referral to [facility]; Future  -     Ambulatory Referral to Social Work Care Management Program; Future  -     Ambulatory Referral to Speech Therapy; Future  -     Ambulatory Referral to Physical Therapy; Future  -     Ambulatory Referral to Occupational Therapy; Future    Injury of head, subsequent encounter  -     Ambulatory Referral to [facility]; Future  -     Ambulatory Referral to Social Work Care Management Program; Future  -     Ambulatory Referral to Speech Therapy; Future  -     Ambulatory Referral to Physical Therapy; Future  -     Ambulatory Referral to Occupational Therapy; Future              Subjective:      Patient ID: Franklin Murphy is a 44 y o  female  CC:    Chief Complaint   Patient presents with    Follow-up     pt saw neurology  mgb       HPI:    Here to follow up after her last visit  She is doing better, but still has some issues  She was seen by Neuro, and they felt it was PTSD like and some emotional control problems for this  She states 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, and they discussed about possibly going on leave  Neuro recommended doing as much as possible, and she should increase her screen time as possible    Also, increase her activity  Today, she is wearing darker glasses, but her last OV, lights were off and she had dark glasses  Reviewed about changing her computer theme  Reviewed about stress management  The following portions of the patient's history were reviewed and updated as appropriate: allergies, current medications, past family history, past medical history, past social history, past surgical history and problem list       Review of Systems   Constitutional: Positive for activity change and fatigue  Negative for appetite change, chills, diaphoresis, fever and unexpected weight change  HENT: Negative  Eyes: Positive for photophobia and visual disturbance  Respiratory: Negative  Neurological: Positive for dizziness (occasional, and much better ) and speech difficulty  Negative for numbness  Psychiatric/Behavioral: Positive for sleep disturbance  All other systems reviewed and are negative  Data to review:       Objective:    Vitals:    04/04/22 1129   BP: 120/74   BP Location: Left arm   Patient Position: Sitting   Cuff Size: Large   Temp: (!) 96 5 °F (35 8 °C)   TempSrc: Temporal   Weight: 125 kg (276 lb)   Height: 5' 3" (1 6 m)        Physical Exam  Vitals and nursing note reviewed  Constitutional:       Appearance: Normal appearance  HENT:      Head: Normocephalic  Cardiovascular:      Rate and Rhythm: Normal rate and regular rhythm  Pulses: Normal pulses  Carotid pulses are 2+ on the right side and 2+ on the left side  Heart sounds: Normal heart sounds  No murmur heard  No friction rub  No gallop  Pulmonary:      Effort: Pulmonary effort is normal  No respiratory distress  Breath sounds: Normal breath sounds  No stridor  No wheezing, rhonchi or rales  Chest:      Chest wall: No tenderness  Neurological:      Mental Status: She is alert

## 2022-04-05 NOTE — TELEPHONE ENCOUNTER
Pt called in and said that she wants Dr Giulherme Chase to rewrite letter to employer saying she just needs to be out on std for 1 month

## 2022-04-06 ENCOUNTER — TELEPHONE (OUTPATIENT)
Dept: OTHER | Facility: OTHER | Age: 37
End: 2022-04-06

## 2022-04-18 ENCOUNTER — OFFICE VISIT (OUTPATIENT)
Dept: AUDIOLOGY | Age: 37
End: 2022-04-18
Payer: COMMERCIAL

## 2022-04-18 DIAGNOSIS — R42 DIZZINESS: ICD-10-CM

## 2022-04-18 PROCEDURE — 92537 CALORIC VSTBLR TEST W/REC: CPT | Performed by: AUDIOLOGIST-HEARING AID FITTER

## 2022-04-18 PROCEDURE — 92540 BASIC VESTIBULAR EVALUATION: CPT | Performed by: AUDIOLOGIST-HEARING AID FITTER

## 2022-04-18 PROCEDURE — 92567 TYMPANOMETRY: CPT | Performed by: AUDIOLOGIST-HEARING AID FITTER

## 2022-04-18 NOTE — PROGRESS NOTES
Videonystagmography (VNG) Evaluation    Name:  Vashti Sepulveda  :  1985  Age:  44 y o  Date of Evaluation: 22     History: Dizziness  Reason for visit: Vashti Sepulveda is seen today at the request of Dr Niya Silva for an evaluation of balance  Patient complains of constant dizziness as a result of multiple head injuries including a traumatic brain injury  She reported a sudden onset of dizziness near 2021, this lasted until 2022 where she had another concussion that essentially stopped her dizziness  She reported her symptoms have recently started again  Tympanometry:   Right: Type As - reduced compliance   Left: Type As - reduced compliance    Oculomotor battery:    Gaze:          Right: Normal        Left: Normal         Up: Normal        Down: Normal      Saccades: Normal     Tracking: Normal     Optokinetic: Normal    Positioning/Positionals:     [employer]:    Right: Negative      Left: Negative  , 6°/second left beating nystagmus       Positionals:     Sitting: Normal    Supine: Normal    Head Right: Normal    Head Left: 7°/second left beating     Body Right: Normal    Body Left[de-identified] 5°/second left beating       Calorics:     Bithermal Caloric Irrigation: Normal    Notes: Left sided geotropic positional nystagmus  Peripheral findings      Recommendations: Consider vestibular physcial therapy evaluation and rehabilitation through [facility]  Follow up with managing physician        Radha Ryan   Clinical Audiologist

## 2022-04-18 NOTE — PROGRESS NOTES
Please notify her that the VNG demonstrated very mild weakness of the left inner ear - continued vestibular therapy is recommended  She already has a vestibular therapist so she should continue

## 2022-04-20 ENCOUNTER — RA CDI HCC (OUTPATIENT)
Dept: OTHER | Facility: HOSPITAL | Age: 37
End: 2022-04-20

## 2022-04-20 NOTE — PROGRESS NOTES
Please review if the following dx  is applicable to the patient's condition and assess and document, if applicable in next visit on 04/28/2022    E66 01: Morbid (severe) obesity due to excess calories ([address] ) -     Per CMS/ICD 10 coding guidelines, BMI of 40 or higher; Class 3 obesity is sometimes categorized as "morbid," "extreme," or "severe" obesity      [address]  coding opportunities          Chart Reviewed number of suggestions sent to Provider: 1     Patients Insurance        Commercial Insurance: [employer]

## 2022-04-26 ENCOUNTER — TELEPHONE (OUTPATIENT)
Dept: NEUROLOGY | Facility: CLINIC | Age: 37
End: 2022-04-26

## 2022-04-26 ENCOUNTER — EVALUATION (OUTPATIENT)
Dept: PHYSICAL THERAPY | Facility: REHABILITATION | Age: 37
End: 2022-04-26
Payer: COMMERCIAL

## 2022-04-26 DIAGNOSIS — F43.10 POST-TRAUMATIC STRESS: ICD-10-CM

## 2022-04-26 DIAGNOSIS — S09.90XD INJURY OF HEAD, SUBSEQUENT ENCOUNTER: Primary | ICD-10-CM

## 2022-04-26 DIAGNOSIS — G44.319 ACUTE POST-TRAUMATIC HEADACHE, NOT INTRACTABLE: ICD-10-CM

## 2022-04-26 PROCEDURE — 97112 NEUROMUSCULAR REEDUCATION: CPT | Performed by: PHYSICAL THERAPIST

## 2022-04-26 PROCEDURE — 97162 PT EVAL MOD COMPLEX 30 MIN: CPT | Performed by: PHYSICAL THERAPIST

## 2022-04-26 NOTE — PROGRESS NOTES
PT Evaluation     Today's date: 2022  Patient name: Joan Mendoza  : 1985  MRN: [id number]  Referring provider: Stanley Evans MD  Dx:   Encounter Diagnosis     ICD-10-CM    1  Injury of head, subsequent encounter  S09  90XD Ambulatory Referral to Physical Therapy   2  Post-traumatic stress  F43 10 Ambulatory Referral to Physical Therapy   3  Acute post-traumatic headache, not intractable  G44 319 Ambulatory Referral to Physical Therapy                  Assessment  Assessment details: [facility] reports to PT with CC of dizziness and balance disorder with hx of repetitive concussions and chronic unresolved post concussion syndrome  Most recent concussion occurring on 3/17/22  Results of evaluation indicate normal significantly impaired static balance, unstable gait, and although occulomotor coordination and VOR was normal today, she had high symptom irritability with these tests  Her dizziness is likely the result of central sensitivty to head/eye movements along with generalized intolerance to functional activity  She would benefit from further testing NV not performed today due to time constraints  These deficits are resulting in difficulty staying employed and performing work duties, and ambulating safely in the community  The patient would benefit from skilled PT to address these deficits and maximize function  Already has referral to OT and speech eval which I do believe she would benefit from, but discussed concerns of time, energy, and financial strain involved with this  Pt will make decision to schedule when ready     Barriers to therapy: Chronic dizziness and repetitive concussions   Understanding of Dx/Px/POC: good   Prognosis: fair    Goals  STGs (4 weeks)  Pt will be independent with comprehensive HEP   Pt will demonstrate improved static balance on compliant surface with EO by at least 10 seconds  Pt will demonstrate at least 4 point improvement on FGA (to be tested)    LTG's (to be achieved by d/c)  Pt will be able to self manage sx's independently   Pt will demonstrated statistically significant improvement on DHI (to be tested)  Pt will be able to perform work duties and tolerate full time schedule  Pt will be able to participate in light aerobic exercise program    Plan  Plan details: Initiate POC  Additional testing NV  Patient would benefit from: skilled physical therapy, OT eval and speech eval  Planned therapy interventions: balance, home exercise program, coordination, therapeutic activities, therapeutic exercise, stretching, strengthening, patient education, postural training, neuromuscular re-education and manual therapy  Frequency: 2x week  Duration in weeks: 8  Plan of Care beginning date: 4/26/2022  Plan of Care expiration date: 7/15/2022  Treatment plan discussed with: patient        Subjective Evaluation    History of Present Illness  Mechanism of injury: Pt comes to PT with CC of dizziness  She reports a hx of vertigo on/off for about 3 years along with repeated concussions  Reports 20 or more head injuries over her life time  She attributes this to be clumsy  Most recent concussion on 3/17/22  States that she felt increased dizziness, headache, and nausea although after few days her symptoms greatly improved  Her dizziness has returned again since the last 1-2 weeks with no known cause  Describes dizziness a vertigo type and dyseqilibrium  States there is always some dizziness (about a 1/10), but believes it is worse with movement  Always worse with transitioning to standing  Has good and bad days  Feels her dizziness can be constant on a bad day even at rest    Reports chronic photophobia (mostly since 2019 concussion)  Working on weaning off sunglasses  Reports constant low grade headache  Headaches worsen with reading, screen time  Chronic issues short term memory and word finding  Lives in a condo needing to to due a partial flight of stairs to enter   Denies difficulty with mobility in home, but admits walking the community on uneven surfaces is hard  Is on short term disability in part due to her dizziness, but also due to reported emotional/pshc issues  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  Has not been able to drive due to dizziness (last drove in 2021)  Feels off balance which she would like to improve  Would also like to be able to exercise       Pain  Current pain ratin  At worst pain ratin  Location: L knee, low back, headaches             Objective          CARDIOVASCULAR SCREENING  Resting BP: (large adult cuff): 118/78  Resting HR: 92            CERVICAL       Cervical Posture: fwd head rounded shoulders    Cervical Range of Motion     Flexion WFL  No pain    Extension WFL  No pain     Left Right   Lateral Flexion WFL with dizziness WFL with dizziness   Rotation WFL  No pain WFL  No pain         VESTIBULAR OCULOMOTOR SCREENING  Oculomotor ROM : WNL  Resting nystagmus: No  Gaze holding nystagmus No   Smooth pursuit Normal, provokes dizziness    Vertical Saccades:Normal  Horizontal Saccades:Normal  Convergence: Normal    Cover/Uncover/Crosscover Test: Normal    Head thrust (room light): deferred     VOR cancellation: normal, but reports headache    VORx1 test: normal, but reports dizziness     Dynamic Visual Acuity: not performed   Static Head: 20/  Dynamic Head: 20/          BALANCE MOTOR SCREENING    MCTSIB Eval     Eyes open firm surface 30s    Eyes closed firm surface 30s    Eyes open foam surface 1s    Eyes closed foam surface Deferred             FGA: not performed         Precautions: PTSD, hx of repeated concussions, high symptom irritability       Manuals                                                                 Neuro Re-Ed             FGA             DHI             VORx1  instructed for HEP            Smooth pursuits             Saccades              Static balance                           Ther Ex 6MWT             Scifit                                                                                            Ther Activity                                       Gait Training                                       Modalities

## 2022-04-26 NOTE — TELEPHONE ENCOUNTER
Called and spoke to patient to remind her of her upcoming appointment with Dr Liudmila Mcdonough on 05/06/22

## 2022-04-28 ENCOUNTER — OFFICE VISIT (OUTPATIENT)
Dept: FAMILY MEDICINE CLINIC | Facility: CLINIC | Age: 37
End: 2022-04-28
Payer: COMMERCIAL

## 2022-04-28 VITALS
HEIGHT: 63 IN | SYSTOLIC BLOOD PRESSURE: 130 MMHG | DIASTOLIC BLOOD PRESSURE: 90 MMHG | BODY MASS INDEX: 48.34 KG/M2 | WEIGHT: 272.8 LBS | HEART RATE: 80 BPM

## 2022-04-28 DIAGNOSIS — R42 VERTIGO: Primary | ICD-10-CM

## 2022-04-28 DIAGNOSIS — G44.319 ACUTE POST-TRAUMATIC HEADACHE, NOT INTRACTABLE: ICD-10-CM

## 2022-04-28 DIAGNOSIS — G43.009 MIGRAINE WITHOUT AURA AND WITHOUT STATUS MIGRAINOSUS, NOT INTRACTABLE: ICD-10-CM

## 2022-04-28 DIAGNOSIS — F43.10 POST-TRAUMATIC STRESS: ICD-10-CM

## 2022-04-28 PROCEDURE — 1036F TOBACCO NON-USER: CPT | Performed by: FAMILY MEDICINE

## 2022-04-28 PROCEDURE — 3008F BODY MASS INDEX DOCD: CPT | Performed by: FAMILY MEDICINE

## 2022-04-28 PROCEDURE — 99214 OFFICE O/P EST MOD 30 MIN: CPT | Performed by: FAMILY MEDICINE

## 2022-04-28 NOTE — ASSESSMENT & PLAN NOTE
Improved  No change at the moment  Of note, the patient still is having some issues with vision  Would recommend that she try to use a filter over her screen to see if that will improve her use ability of the computer  If that is the case, she could certainly return to work  This depends on how well she does in the long run with it  Would initially try at home for approximately 2 weeks

## 2022-04-28 NOTE — PATIENT INSTRUCTIONS
Problem List Items Addressed This Visit     Acute post-traumatic headache     Improved  No change at the moment  Of note, the patient still is having some issues with vision  Would recommend that she try to use a filter over her screen to see if that will improve her use ability of the computer  If that is the case, she could certainly return to work  This depends on how well she does in the long run with it  Would initially try at home for approximately 2 weeks  Migraine without aura and without status migrainosus, not intractable     Patient reports he is doing much better with this  She does have Maxalt, but has not needed to use it  Will continue to observe  Post-traumatic stress     Patient reports that her motion seem to be more under control, but she is having more verbal tics, more specifically cursing  It does bother her, and she is aware of it  Consider follow-up with Psychiatry verses Neurology again  Vertigo - Primary     Patient does continue to have what appears to be significant vertigo symptoms  ENT recommended vestibular rehab, and I would agree  It appears that it is left-sided based  This is based on the VNG that was performed recently  Again, PT for this  COVID 19 Instructions    Yolanda Santo was advised to limit contact with others to essential tasks such as getting food, medications, and medical care  Proper handwashing reviewed, and Hand sanitzer when washing is not available  If the patient develops symptoms of COVID 19, the patient should call the office as soon as possible  For 3922-7013 Flu season, it is strongly recommended that Flu Vaccinations be obtained  Virtual Visits:  Arsenio: This works on smart phones (any phone with Internet browsing capability)  You should get a text message when the provider is ready to see you  Click on the link in the text message, and the call should start    You will need to type in your name, and allow camera and microphone access  This is HIPPA compliant, and secure  If you have not already done so, get immunized to COVID 19  We are committed to getting you vaccinated as soon as possible and will be closely following CDC and [facility]  guidelines as they are released and revised  Please refer to our COVID-19 vaccine webpage for the most up to date information on the vaccine and our distribution efforts  This site will also have the most up to date recommendations for COVID booster vaccine  Chio rousseau    Call [phone] ([phone]), option [phone]    OUR NEW LOCATION:    [address], [address] [place] [address], [place], [address]  Fax: [phone]    Lab services and OB/GYN are at this location as well

## 2022-04-28 NOTE — ASSESSMENT & PLAN NOTE
Patient reports he is doing much better with this  She does have Maxalt, but has not needed to use it  Will continue to observe

## 2022-04-28 NOTE — ASSESSMENT & PLAN NOTE
Patient does continue to have what appears to be significant vertigo symptoms  ENT recommended vestibular rehab, and I would agree  It appears that it is left-sided based  This is based on the VNG that was performed recently  Again, PT for this

## 2022-04-28 NOTE — PROGRESS NOTES
Assessment and Plan:    Problem List Items Addressed This Visit     Acute post-traumatic headache     Improved  No change at the moment  Of note, the patient still is having some issues with vision  Would recommend that she try to use a filter over her screen to see if that will improve her use ability of the computer  If that is the case, she could certainly return to work  This depends on how well she does in the long run with it  Would initially try at home for approximately 2 weeks  Migraine without aura and without status migrainosus, not intractable     Patient reports he is doing much better with this  She does have Maxalt, but has not needed to use it  Will continue to observe  Post-traumatic stress     Patient reports that her motion seem to be more under control, but she is having more verbal tics, more specifically cursing  It does bother her, and she is aware of it  Consider follow-up with Psychiatry verses Neurology again  Vertigo - Primary     Patient does continue to have what appears to be significant vertigo symptoms  ENT recommended vestibular rehab, and I would agree  It appears that it is left-sided based  This is based on the VNG that was performed recently  Again, PT for this  Diagnoses and all orders for this visit:    Vertigo    Post-traumatic stress    Migraine without aura and without status migrainosus, not intractable    Acute post-traumatic headache, not intractable    Other orders  -     Multiple Vitamins-Minerals (ONE-A-DAY WOMENS PO); Take by mouth            Subjective:      Patient ID: Marybeth Borrego is a 44 y o  female  CC:    Chief Complaint   Patient presents with    Follow-up     per pt she is present today for concussion follow up       HPI:    Emotions are better    Still has some issues with cursing, more specifically, noted that she was cursing during PT session, and that is not her normal     Has had vertigo back again with this  Disability wanted to know a RTW date  Dizziness is significant now  Some nausea at times (with PT)  Had improved in past  Was since December 2021  Coming back now  Patient has seen ENT, and had VNG, which showed that there were some changes  Patient was supposed to go back to physical therapy for that  She has been to a PT evaluation  HA are a bit better  Less concerns  The following portions of the patient's history were reviewed and updated as appropriate: allergies, current medications, past family history, past medical history, past social history, past surgical history and problem list       Review of Systems   Constitutional: Negative  HENT: Negative  Eyes: Positive for visual disturbance  Respiratory: Negative  Cardiovascular: Negative  Neurological:        Per HPI     Psychiatric/Behavioral:        Per HPI         Data to review:       Objective:    Vitals:    04/28/22 1137   BP: 130/90   BP Location: Left arm   Patient Position: Sitting   Cuff Size: Standard   Pulse: 80   Weight: 124 kg (272 lb 12 8 oz)   Height: 5' 3" (1 6 m)        Physical Exam  Vitals and nursing note reviewed  Constitutional:       Appearance: Normal appearance  HENT:      Head: Normocephalic  Cardiovascular:      Rate and Rhythm: Normal rate and regular rhythm  Pulses: Normal pulses  Carotid pulses are 2+ on the right side and 2+ on the left side  Heart sounds: Normal heart sounds  No murmur heard  No friction rub  No gallop  Pulmonary:      Effort: Pulmonary effort is normal  No respiratory distress  Breath sounds: Normal breath sounds  No stridor  No wheezing, rhonchi or rales  Chest:      Chest wall: No tenderness  Neurological:      Mental Status: She is alert

## 2022-04-28 NOTE — LETTER
April 28, 2022     Patient: Nichole Her  YOB: 1985  Date of Visit: 4/28/2022      To Whom it May Concern:    Nichole Her is under my professional care  Lore Sands was seen in my office on 4/28/2022  Lore Sands may return to work with limitations :  Return on the 9 May 2022, needs to be able to use a filter on her computer screen, take frequent breaks, and for the 1st 2 weeks she should be able to work for home if possible       If you have any questions or concerns, please don't hesitate to call           Sincerely,          Teddy Fisher MD        CC: No Recipients

## 2022-04-28 NOTE — ASSESSMENT & PLAN NOTE
Patient reports that her motion seem to be more under control, but she is having more verbal tics, more specifically cursing  It does bother her, and she is aware of it  Consider follow-up with Psychiatry verses Neurology again

## 2022-04-29 ENCOUNTER — DOCUMENTATION (OUTPATIENT)
Dept: FAMILY MEDICINE CLINIC | Facility: CLINIC | Age: 37
End: 2022-04-29

## 2022-05-03 ENCOUNTER — OFFICE VISIT (OUTPATIENT)
Dept: PHYSICAL THERAPY | Facility: REHABILITATION | Age: 37
End: 2022-05-03
Payer: COMMERCIAL

## 2022-05-03 DIAGNOSIS — F43.10 POST-TRAUMATIC STRESS: ICD-10-CM

## 2022-05-03 DIAGNOSIS — S09.90XD INJURY OF HEAD, SUBSEQUENT ENCOUNTER: Primary | ICD-10-CM

## 2022-05-03 DIAGNOSIS — G44.319 ACUTE POST-TRAUMATIC HEADACHE, NOT INTRACTABLE: ICD-10-CM

## 2022-05-03 PROCEDURE — 97112 NEUROMUSCULAR REEDUCATION: CPT | Performed by: PHYSICAL THERAPIST

## 2022-05-03 PROCEDURE — 97110 THERAPEUTIC EXERCISES: CPT | Performed by: PHYSICAL THERAPIST

## 2022-05-05 ENCOUNTER — TELEPHONE (OUTPATIENT)
Dept: NEUROLOGY | Facility: CLINIC | Age: 37
End: 2022-05-05

## 2022-05-05 NOTE — PROGRESS NOTES
[facility] [facility] Consult - Follow up   PATIENT:  Annalee Briseno  MRN:  [id number]  :  1985  DATE OF SERVICE:  2022  REFERRED BY: No ref  provider found  PMD: Rosa Kessler MD    Assessment/Plan:   Annalee Briseno is a very pleasant  44 y o  female with a past medical history that includes migraine, BMI over 45, vertigo, B12 deficiency, vitamin-D deficiency, insomnia, anxiety, depression, mild degenerative disc disease C4-5 referred here for evaluation of headache  My initial evaluation 3/29/2022     Acute post-traumatic headache  History of possible concussion - resolved  Chronic daily headache  Migraine without aura and without status migrainosus, not intractable  Post-traumatic stress with some symptoms of functional neurologic disorder  Deconditioning   We discussed her history of multiple possible concussions although we discussed it is also difficult to diagnose concussion definitively and how posttraumatic headache with migrainous features can often have similar presentation  Please see EMR and HPI for details  Most recently on 2022, she was seated on a couch when the back of the couch collapsed causing her to fall and hit the right side of her head on an end table she thinks, does not remember hitting her head, but had a red conchis at the right cheek area  She reports she does not remember a few seconds of time which we discussed can happen as a stress reaction are related to concussion and had progressively worsening up chronic vertigo and posttraumatic headache with migrainous features  She was evaluated emergency department where noncontrast head CT was unremarkable for acute pathology and she subsequently followed up with primary care provider who kept her out of work and asked her to follow up with Neurology    - As of 2022:  She reports following the injury vertigo actually got much better, she has chronic daily back on headaches for years dating back to at least 03/2020 that is worse about 1 day a week for which she is not interested in prescription preventative, did start trial of rizatriptan for abortive  She also has history of anxiety, depression and some symptoms of posttraumatic stress with functional neurologic disorder with hyper startle response, avoidance behavior and wearing sunglasses indoors, intrusion symptoms, negative impact on cognition and mood and arousal and reactivity changes  Also some symptoms of deconditioning and maladaptive coping  We discussed continue follow-up with PCP and recommended establishing care with a counselor for which I will have our  see if she can help her with a list   She was reassured by this information and we discussed gradual return to normalcy  Referral to sleep medicine for possible sleep apnea contributing to symptoms  - as of 5/6/2022: She reports she continues to have near daily headaches that are mild about 3/10 and worse about 2 times in the past month for which she did not yet try rizatriptan, however did go to ED today instead and sumatriptan plus IV fluids and Zofran helped  She is still working on following up with Sleep Medicine, eye doctor, counseling  She is now interested in prescription preventative and will start trial of amitriptyline with gradual titration up and again recommended taking rizatriptan next migraine  Workup:  - as of initial visit 03/29/2022 Neurologic assessment reveals normal neurological exam except for depressed mood and affect, tearful, sunglasses indoors, hyper startle response to light touch, allodynia bifrontal forehead  - noncontrast head CT 03/04/2020: No acute intracranial abnormality   - CTA head and neck with without contrast 11/02/2021: Normal CT angiogram of the head and neck  - MRI brain IAC with without contrast 2/9/22: No IAC or CP angle pathology  Unremarkable MRI of the brain    - noncontrast head CT 03/17/2022: No acute intracranial abnormality  - noncontrast head CT 05/06/2022: No acute intracranial abnormality  -CTA head and neck 05/06/2022: Negative CTA head and neck for large vessel occlusion, dissection, aneurysm, or high-grade stenosis      New persistent asymmetrically prominent right superior orbital vein    Preventative:  - we discussed headache hygiene and lifestyle factors that may improve headaches  - trial of amitriptyline 10 mg nightly for 1 week followed by increasing by 10 mg each week until reach 50 mg  Discussed proper use, possible side effects and risks  - Past/ failed/contraindicated:  None  - future options:  topiramate, CGRP med    Abortive:  - discussed not taking over-the-counter or prescription pain medications more than 3 days per week to prevent medication overuse/rebound headache  -     rizatriptan (MAXALT) 10 MG tablet; Take 1 tablet (10 mg total) by mouth once as needed for migraine May repeat in 2 hours if needed  Max 2/24 hours, 9/month  Discussed proper use, possible side effects and risks  - Currently on through other providers:  Ibuprofen, meclizine  - Past/ failed/contraindicated:  OTC meds  - past:  Has tolerated steroids in the past, migraine cocktails have brought pain down, sumatriptan in ED helped with IV fluids and Zofran  - future options:  prochlorperazine, Toradol IM or p o , could consider trial for 5 days of Depakote or dexamethasone for prolonged migraine, ubrelvy, reyvow, nurtec    We have discussed concussions and the natural course of recovery  We have discussed that symptoms from a concussion typically take 1-2 weeks to resolve, and although sometimes it can feel like concussion symptoms linger on, at this point these symptoms would be more likely related to contributing factors      - Contributing factors may include:   Post traumatic stress, Prolonged removal from normal routine,  posttraumatic headache,  comorbid injuries, preexisting chronic headaches or migraines,  medication overuse headache, anxiety or depression, stress, deconditioning,  comorbid medical diagnoses  - I have recommended gradual return of normal cognitive and physical activity with safety precautions  - We discussed that newer research regarding concussion shows that the sooner one returns gradually to their normal physical and cognitive routine, the sooner one tends to recover  Prolonged removal from normal routine and deconditioning have been shown to prolong symptoms and worsen symptoms   - We discussed that sometimes there is a constellation of symptoms that some refer to as "post concussion syndrome," but I prefer not to use this term since that can be misleading and make people think they are still brain injured or "concussed," when the most common and likely etiology this far out from the head trauma is either contributing factors or a form of functional neurologic disorder with mixed symptoms  - We discussed how cognitive issues can have multiple causes and often related to multifactorial etiologies including stress, anxiety,  mood, pain, hypervigilance  and sleep issues and provided reassurance that, it is not likely the cognitive dysfunction is related to concussion at this point    - Safe driving precautions, should not drive at all if feeling sleepy or cognitively not well  * We discussed the most likely therapy to help in these cases would be counseling    Insomnia, snoring, concern for BUCKY  -     Ambulatory referral to Sleep Medicine; Future    Patient instructions      If when you obtain your Vitamin B12 it is on the low end of normal, I recommend repletion below 400  I recommend adding over-the-counter vitamin B12 1000 mcg daily sublingual (better absorption than pill)    Establish care with counseling     Referral to sleep medicine placed 3/29/22 - [phone]    Follow up with eye doctor for dilated eye exam     We dicussed gradual return to normalcy     Neurosymptoms  org - good website for functional neurologic disorder     Headache/migraine treatment:   Abortive medications (for immediate treatment of a headache): It is ok to take ibuprofen, acetaminophen or naproxen (Advil, Tylenol,  Aleve, Excedrin) if they help your headaches you should limit these to No more than 3 times a week to avoid medication overuse/rebound headaches  For your more moderate to severe migraines take this medication early   Maxalt (rizatriptan) 10mg tabs - take one at the onset of headache  May repeat one time after 1-2 hours if pain has not resolved  (Max 2 a day and 9 a month)       Prescription preventive medications for headaches/migraines   (to take every day to help prevent headaches - not to take at the time of headache):  [x] trial of amitriptyline 10 mg nightly for 1 week followed by increasing by 10 mg each week until reach 50 mg  Discussed proper use, possible side effects and risks  *Typically these types of medications take time untill you see the benefit, although some may see improvement in days, often it may take weeks, especially if the medication is being titrated up to a beneficial level  Please contact us if there are any concerns or questions regarding the medication  Lifestyle Recommendations:  [x] SLEEP - Maintain a regular sleep schedule: Adults need at least 7-8 hours of uninterrupted a night  Maintain good sleep hygiene:  Going to bed and waking up at consistent times, avoiding excessive daytime naps, avoiding caffeinated beverages in the evening, avoid excessive stimulation in the evening and generally using bed primarily for sleeping  One hour before bedtime would recommend turning lights down lower, decreasing your activity (may read quietly, listen to music at a low volume)  When you get into bed, should eliminate all technology (no texting, emailing, playing with your phone, iPad or tablet in bed)  [x] HYDRATION - Maintain good hydration    Drink  2L of fluid a day (4 typical small water bottles)  [x] DIET - Maintain good nutrition  In particular don't skip meals and try and eat healthy balanced meals regularly  [x] TRIGGERS - Look for other triggers and avoid them: Limit caffeine to 1-2 cups a day or less  Avoid dietary triggers that you have noticed bring on your headaches (this could include aged cheese, peanuts, MSG, aspartame and nitrates)  [x] EXERCISE - physical exercise as we all know is good for you in many ways, and not only is good for your heart, but also is beneficial for your mental health, cognitive health and  chronic pain/headaches  I would encourage at the least 5 days of physical exercise weekly for at least 30 minutes  Education and Follow-up  [x] Please call with any questions or concerns  Of course if any new concerning symptoms go to the emergency department  [x] Follow up [facility] Malhotra 6/28/22 and with Dr Zachary Gottlieb in August         CC: We had the pleasure of evaluating Mumtaz Nazario in neurological consultation today  Mumtaz Nazario is a   left  handed female who presents today for evaluation of headaches  History obtained from patient as well as available medical record review    History of Present Illness:   Interval history as of 5/6/2022  - PCP is managing return to work, she is going back to work on Monday   -she had VNG which showed some sort of peripheral abnormality on the left contributing to her vertigo and is back with vestibular therapy  - she continues to struggle with deconditioning to lights and activities  -she has not yet followed up with counselor as recommended although  did offer list of counselors covered by her insurance, but has someone at PCP office that she is looking into   - She has not yet followed up with Sleep Medicine is recommended  - has not yet seen eye doctor for dilated eye exam, planning for next month     Headaches and migraines   She had she continues to have chronic near daily headaches that are typically mild at about 3/10, but only one significant migraine 6/10 yesterday where she took aleve and today was worse and went with ED  She went to the emergency department this morning due to migraine with diplopia, did not try rizatriptan and had improvement with sumatriptan a migraine cocktail with significant improvement in symptoms  She in fact saw Neurology while in the emergency department and CTA head and neck and CT head were performed  Has mostly not been taking any medications for headaches  Headache was worse yesterday and she had a naproxen in early afternoon  She has not been taking maxalt at home  Has been working on exposure therapy at home with light and screens and is only up to moderate tolerance  Preventative: nothing   Abortive: had not tried Rizatriptan yet      History as of our initial visit 03/29/2022: History of concussion  - 9th grade  States that she hit her head during indoor soccer and perhaps had a few seconds of LOC vs anmesia  - Most of 20s did not have medical insurance and had some hits to the head   - 2/29/2020 hit her head on her car  Didn't have LOC  Headaches, motion and light sensitivity started 20 minutes after  Next time lost balance   - 3/4/2020 when she was stepping out of her car, loss balance, did not hit head  Went to ER and then did therapy afterwards  Quit her job after this      - Patient started a job in August 2021 after being out of work for 1 5 years  She feels like this was over stimulating for her  She felt noise and light were overwhelming for her      - November of 2021 patient presented to her family doctor with a severe headache that was unrelenting for 1 and half weeks  Patient had stabbing on the left side of her head  dizziness and felt unsteady  She was sent to the emergency room for evaluation via ambulance    CTA of the head and neck was done in the emergency room which was negative   - Since the ER, states that she developed dizziness 1 week before José Manuel out of nowhere  working from home since 12/27/2021 due to her dizziness  - Does wear her sunglasses frequently during the day indoors  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 the max time without the glasses was 1 hour and then needed the entire rest of the day    States at home uses less, however she did place them on 2 times during our visit  - She has followed with Sports Medicine, physical therapy, primary care, saw my neurology colleague [facility] Carreon 02/02/2022  - went back to work after visit [facility] Carreon, was working from home until episode 3/17/22    On 03/17/2022 she was seated on a couch when the back of the couch collapsed causing her to fall and hit the right side of her head on an end table she thinks, does not remember hitting her head, but had a red conchis at the right cheek area  Acute symptoms included:  Does not remember a few seconds of time, unknown if LOC as unwitnessed, progressively worsening vertigo/room spinning and generalized headache with photophobia and phonophobia and nausea    - as of 3/29/2022: she continues to have chronic photophobia, migraines, but vertigo after the incident actually has now been better than prior     Mood:   - anxiety and depression in the past, worse later due to medical conditions   - post traumatic stress symptoms - hypervigilance, impact on cognition and mood, lack of emotional filter  Things at work are bad, HR said if no emotional filter you should not come back   Never worked with a counselor   No SI    Work  8-4  M-F  Out the past 2 weeks      Headaches started at what age? 20 years old  How often do the headaches occur?   - as of 3/29/2022: chronic daily background headaches for years since at least 3/2020, worse when vertigo kicked in Dec 2021 and 1 day a week above 3/10    Aura? without aura      Last eye exam: eye sight improving, followed a long time ago, 7 years ago     Where is your headache located and pain quality?   - bifrontal pressure like an iron band across forehead is most common and radiates back across the top of the scalp until bioccipital   - ice pick left parietal   What is the intensity of pain? Average: 1/10, worst 7/10  Associated symptoms:   [x] Nausea       [] Vomiting      [x] Photophobia     [x]Phonophobia      [] Osmophobia  [] Blurred vision   [x] Light-headed or dizzy - chronic     [] Tinnitus - not for prolonged periods and not severe  [] Hands or feet tingle or feel numb/paresthesias    [] Ptosis      [] Facial droop  [] Lacrimation  [] Nasal congestion/rhinorrhea        Things that make the headache worse? No specific movements, sometimes bending over    Headache triggers:  Lights, can be worse in am, stress, weather changes, unsure if hormonal     Have you seen someone else for headaches or pain? Yes many providers   Are you current pregnant or planning on getting pregnant? No plans, not active   Have you ever had any Brain imaging? yes    What medications do you take or have you taken for your headaches?    ABORTIVE:      Ibuprofen dulls it  Meclizine    Past  Something as needed in the past   Steroids in the past   Migraine cocktails in ED have helped bring pain down     PREVENTIVE:   -    MVI     Past/ failed/contraindicated:  -      LIFESTYLE  Sleep   - averages: 6-8 hours, snores, never had a sleep study  Problems falling asleep?:   Yes  Problems staying asleep?:  Yes, 1-4 for unknown reasons      Water: 6 cups per day  Caffeine: usually 0-1 soda per day          The following portions of the patient's history were reviewed and updated as appropriate: allergies, current medications, past family history, past medical history, past social history, past surgical history and problem list     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  Lives with cat    Past Medical History:     Past Medical History:   Diagnosis Date    Concussion     Edema of left lower extremity 7/12/2021    TBI (traumatic brain injury) [facility])     [id number] stated by patient       Patient Active Problem List   Diagnosis    Obesity    Head injury    Headache    Vertigo    Worsening headaches    Migraine without aura and without status migrainosus, not intractable    B12 deficiency    Vitamin D deficiency    Snoring    History of multiple concussions    Post-traumatic stress    Acute post-traumatic headache    Migraine       Medications:      Current Outpatient Medications   Medication Sig Dispense Refill    Multiple Vitamins-Minerals (ONE-A-DAY WOMENS PO) Take by mouth      meclizine (ANTIVERT) 25 mg tablet Take 1 tablet (25 mg total) by mouth 3 (three) times a day as needed for dizziness for up to 20 doses (Patient not taking: Reported on 3/29/2022 ) 30 tablet 1    rizatriptan (MAXALT) 10 MG tablet Take 1 tablet (10 mg total) by mouth once as needed for migraine May repeat in 2 hours if needed  Max 2/24 hours, 9/month  (Patient not taking: Reported on 4/28/2022 ) 9 tablet 6     No current facility-administered medications for this visit          Allergies:    No Known Allergies    Family History:     Family History   Problem Relation Age of Onset   [facility] Cancer Mother     Heart disease Father        Social History:     Social History     Socioeconomic History    Marital status: Single     Spouse name: Not on file    Number of children: Not on file    Years of education: Not on file    Highest education level: Not on file   Occupational History    Not on file   Tobacco Use    Smoking status: Never Smoker    Smokeless tobacco: Never Used   Vaping Use    Vaping Use: Never used   Substance and Sexual Activity    Alcohol use: Never    Drug use: Never    Sexual activity: Not on file   Other Topics Concern    Not on file   Social History Narrative CONSUMES 1 SODA PER DAY     Social Determinants of Health     Financial Resource Strain: Not on file   Food Insecurity: Not on file   Transportation Needs: Not on file   Physical Activity: Not on file   Stress: Not on file   Social Connections: Not on file   Intimate Partner Violence: Not on file   Housing Stability: Not on file         Objective:     Physical Exam:                                                                 Vitals:            Constitutional:    /90 (BP Location: Left arm, Patient Position: Sitting, Cuff Size: Standard)   Pulse 83   Ht 5' 3" (1 6 m)   Wt 127 kg (281 lb)   LMP 05/06/2022   BMI 49 78 kg/m²   BP Readings from Last 3 Encounters:   05/06/22 156/90   05/06/22 125/60   04/28/22 130/90     Pulse Readings from Last 3 Encounters:   05/06/22 83   05/06/22 67   04/28/22 80         Well developed, well nourished, well groomed  No dysmorphic features  HEENT:  Normocephalic atraumatic  See neuro exam   Chest:  Respirations appear regular and unlabored  Cardiovascular:  no observed significant swelling  Musculoskeletal:  (see below under neurologic exam for evaluation of motor function and gait)   Skin:  warm and dry, not diaphoretic  Psychiatric:  Normal behavior and appropriate affect, except for wearing sunglasses indoors       Neurological Examination:     Mental status/cognitive function:   Recent and remote memory appear intact  Attention span and concentration as well as fund of knowledge are appropriate for age  Normal language and spontaneous speech  Cranial Nerves:  VII-facial expression symmetric  Motor Exam: symmetric bulk throughout  no atrophy, fasciculations or abnormal movements noted     Coordination:  no apparent dysmetria, ataxia or tremor noted  Gait: steady casual gait      Pertinent lab results:   - routine labs ordered 02/08/2022 and not yet obtained  11/02/2021 CMP and CBC unremarkable  TSH normal     EKG 05/06/2022 sinus rhythm with sinus arrhythmia, normal EKG, rate 74,   EKG 5/15/2014,  normal sinus rhythm, rate 80,      Imaging: I have personally reviewed imaging and radiology read   - noncontrast head CT 03/04/2020: No acute intracranial abnormality   - CTA head and neck with without contrast 11/02/2021: Normal CT angiogram of the head and neck  - MRI brain IAC with without contrast 2/9/22: No IAC or CP angle pathology   Unremarkable MRI of the brain  - noncontrast head CT 03/17/2022: No acute intracranial abnormality  Review of Systems:   ROS obtained by medical assistant Personally reviewed and updated if indicated  I recommended PCP follow up for non neurologic problems  Review of Systems   Constitutional: Negative for appetite change and fever  HENT: Negative  Negative for hearing loss, tinnitus, trouble swallowing and voice change  Eyes: Positive for visual disturbance  Negative for photophobia and pain  Respiratory: Negative  Negative for shortness of breath  Cardiovascular: Negative  Negative for palpitations  Gastrointestinal: Positive for nausea  Negative for vomiting  Endocrine: Negative  Negative for cold intolerance  Genitourinary: Negative  Negative for dysuria, frequency and urgency  Musculoskeletal: Positive for neck pain  Negative for myalgias  Skin: Negative  Negative for rash  Neurological: Positive for headaches  Negative for tremors, seizures, syncope, facial asymmetry, speech difficulty, light-headedness and numbness  Hematological: Negative  Does not bruise/bleed easily  Psychiatric/Behavioral: Negative  Negative for confusion, hallucinations and sleep disturbance     All other systems reviewed and are negative        I have spent 29 minutes with Patient  today in which greater than 50% of this time was spent in counseling/coordination of care regarding Diagnostic results, Prognosis, Risks and benefits of tx options, Intructions for management, Patient  education, Importance of tx compliance, Risk factor reductions and Impressions  I also spent 13 minutes non face to face for this patient the same day         Author:  Adelina Severe, MD 5/6/2022 3:09 PM

## 2022-05-05 NOTE — TELEPHONE ENCOUNTER
Recevided Dissability forms via fax for the 2nd time  Please make aware if you are agree to fill  Charges have not been dropped and forms have been scanned into media

## 2022-05-06 ENCOUNTER — HOSPITAL ENCOUNTER (EMERGENCY)
Facility: HOSPITAL | Age: 37
Discharge: HOME | End: 2022-05-06
Attending: EMERGENCY MEDICINE | Admitting: EMERGENCY MEDICINE
Payer: COMMERCIAL

## 2022-05-06 ENCOUNTER — APPOINTMENT (EMERGENCY)
Dept: CT IMAGING | Facility: HOSPITAL | Age: 37
End: 2022-05-06
Payer: COMMERCIAL

## 2022-05-06 ENCOUNTER — OFFICE VISIT (OUTPATIENT)
Dept: NEUROLOGY | Facility: CLINIC | Age: 37
End: 2022-05-06
Payer: COMMERCIAL

## 2022-05-06 VITALS
SYSTOLIC BLOOD PRESSURE: 125 MMHG | BODY MASS INDEX: 49.95 KG/M2 | HEART RATE: 67 BPM | WEIGHT: 281.97 LBS | DIASTOLIC BLOOD PRESSURE: 60 MMHG | TEMPERATURE: 98.3 F | RESPIRATION RATE: 18 BRPM | OXYGEN SATURATION: 98 %

## 2022-05-06 VITALS
DIASTOLIC BLOOD PRESSURE: 90 MMHG | HEIGHT: 63 IN | HEART RATE: 83 BPM | BODY MASS INDEX: 49.79 KG/M2 | SYSTOLIC BLOOD PRESSURE: 156 MMHG | WEIGHT: 281 LBS

## 2022-05-06 DIAGNOSIS — G43.009 MIGRAINE WITHOUT AURA AND WITHOUT STATUS MIGRAINOSUS, NOT INTRACTABLE: ICD-10-CM

## 2022-05-06 DIAGNOSIS — G44.319 ACUTE POST-TRAUMATIC HEADACHE: ICD-10-CM

## 2022-05-06 DIAGNOSIS — R51.9 ACUTE INTRACTABLE HEADACHE, UNSPECIFIED HEADACHE TYPE: ICD-10-CM

## 2022-05-06 DIAGNOSIS — F44.7 FUNCTIONAL NEUROLOGICAL SYMPTOM DISORDER WITH MIXED SYMPTOMS: ICD-10-CM

## 2022-05-06 DIAGNOSIS — F43.10 POST-TRAUMATIC STRESS: ICD-10-CM

## 2022-05-06 DIAGNOSIS — H53.2 DOUBLE VISION: Primary | ICD-10-CM

## 2022-05-06 DIAGNOSIS — R51.9 CHRONIC DAILY HEADACHE: Primary | ICD-10-CM

## 2022-05-06 PROBLEM — G43.909 MIGRAINE: Status: ACTIVE | Noted: 2022-05-06

## 2022-05-06 LAB
ANION GAP SERPL CALCULATED.3IONS-SCNC: 7 MMOL/L (ref 4–13)
APTT PPP: 20 SECONDS (ref 23–37)
ATRIAL RATE: 74 BPM
BUN SERPL-MCNC: 13 MG/DL (ref 5–25)
CALCIUM SERPL-MCNC: 8.6 MG/DL (ref 8.3–10.1)
CARDIAC TROPONIN I PNL SERPL HS: 2 NG/L
CHLORIDE SERPL-SCNC: 105 MMOL/L (ref 100–108)
CO2 SERPL-SCNC: 26 MMOL/L (ref 21–32)
CREAT SERPL-MCNC: 0.88 MG/DL (ref 0.6–1.3)
ERYTHROCYTE [DISTWIDTH] IN BLOOD BY AUTOMATED COUNT: 11.9 % (ref 11.6–15.1)
FLUAV RNA RESP QL NAA+PROBE: NEGATIVE
FLUBV RNA RESP QL NAA+PROBE: NEGATIVE
GFR SERPL CREATININE-BSD FRML MDRD: 83 ML/MIN/1.73SQ M
GLUCOSE SERPL-MCNC: 90 MG/DL (ref 65–140)
GLUCOSE SERPL-MCNC: 91 MG/DL (ref 65–140)
HCT VFR BLD AUTO: 36.7 % (ref 34.8–46.1)
HGB BLD-MCNC: 12.4 G/DL (ref 11.5–15.4)
INR PPP: 0.98 (ref 0.84–1.19)
MCH RBC QN AUTO: 30.9 PG (ref 26.8–34.3)
MCHC RBC AUTO-ENTMCNC: 33.8 G/DL (ref 31.4–37.4)
MCV RBC AUTO: 92 FL (ref 82–98)
P AXIS: 27 DEGREES
PLATELET # BLD AUTO: 279 THOUSANDS/UL (ref 149–390)
PMV BLD AUTO: 9.2 FL (ref 8.9–12.7)
POTASSIUM SERPL-SCNC: 4.1 MMOL/L (ref 3.5–5.3)
PR INTERVAL: 148 MS
PROTHROMBIN TIME: 12.7 SECONDS (ref 11.6–14.5)
QRS AXIS: 52 DEGREES
QRSD INTERVAL: 74 MS
QT INTERVAL: 368 MS
QTC INTERVAL: 408 MS
RBC # BLD AUTO: 4.01 MILLION/UL (ref 3.81–5.12)
RSV RNA RESP QL NAA+PROBE: NEGATIVE
SARS-COV-2 RNA RESP QL NAA+PROBE: NEGATIVE
SODIUM SERPL-SCNC: 138 MMOL/L (ref 136–145)
T WAVE AXIS: 32 DEGREES
VENTRICULAR RATE: 74 BPM
WBC # BLD AUTO: 5.21 THOUSAND/UL (ref 4.31–10.16)

## 2022-05-06 PROCEDURE — 70496 CT ANGIOGRAPHY HEAD: CPT

## 2022-05-06 PROCEDURE — 96361 HYDRATE IV INFUSION ADD-ON: CPT

## 2022-05-06 PROCEDURE — 99215 OFFICE O/P EST HI 40 MIN: CPT | Performed by: PSYCHIATRY & NEUROLOGY

## 2022-05-06 PROCEDURE — 99285 EMERGENCY DEPT VISIT HI MDM: CPT | Performed by: EMERGENCY MEDICINE

## 2022-05-06 PROCEDURE — 85610 PROTHROMBIN TIME: CPT

## 2022-05-06 PROCEDURE — 80048 BASIC METABOLIC PNL TOTAL CA: CPT

## 2022-05-06 PROCEDURE — 70498 CT ANGIOGRAPHY NECK: CPT

## 2022-05-06 PROCEDURE — 93005 ELECTROCARDIOGRAM TRACING: CPT

## 2022-05-06 PROCEDURE — 0241U HB NFCT DS VIR RESP RNA 4 TRGT: CPT

## 2022-05-06 PROCEDURE — 85730 THROMBOPLASTIN TIME PARTIAL: CPT

## 2022-05-06 PROCEDURE — 99244 OFF/OP CNSLTJ NEW/EST MOD 40: CPT | Performed by: PSYCHIATRY & NEUROLOGY

## 2022-05-06 PROCEDURE — 84484 ASSAY OF TROPONIN QUANT: CPT

## 2022-05-06 PROCEDURE — 85027 COMPLETE CBC AUTOMATED: CPT

## 2022-05-06 PROCEDURE — 82948 REAGENT STRIP/BLOOD GLUCOSE: CPT

## 2022-05-06 PROCEDURE — G1004 CDSM NDSC: HCPCS

## 2022-05-06 PROCEDURE — 96374 THER/PROPH/DIAG INJ IV PUSH: CPT

## 2022-05-06 PROCEDURE — 93010 ELECTROCARDIOGRAM REPORT: CPT | Performed by: INTERNAL MEDICINE

## 2022-05-06 PROCEDURE — 99285 EMERGENCY DEPT VISIT HI MDM: CPT

## 2022-05-06 PROCEDURE — 36415 COLL VENOUS BLD VENIPUNCTURE: CPT

## 2022-05-06 RX ORDER — ONDANSETRON 2 MG/ML
4 INJECTION INTRAMUSCULAR; INTRAVENOUS ONCE
Status: COMPLETED | OUTPATIENT
Start: 2022-05-06 | End: 2022-05-06

## 2022-05-06 RX ORDER — SUMATRIPTAN 25 MG/1
50 TABLET, FILM COATED ORAL ONCE
Status: COMPLETED | OUTPATIENT
Start: 2022-05-06 | End: 2022-05-06

## 2022-05-06 RX ORDER — AMITRIPTYLINE HYDROCHLORIDE 10 MG/1
TABLET, FILM COATED ORAL
Qty: 150 TABLET | Refills: 4 | Status: SHIPPED | OUTPATIENT
Start: 2022-05-06

## 2022-05-06 RX ADMIN — IOHEXOL 85 ML: 350 INJECTION, SOLUTION INTRAVENOUS at 09:48

## 2022-05-06 RX ADMIN — SODIUM CHLORIDE 1000 ML: 0.9 INJECTION, SOLUTION INTRAVENOUS at 10:15

## 2022-05-06 RX ADMIN — SUMATRIPTAN SUCCINATE 50 MG: 25 TABLET ORAL at 10:16

## 2022-05-06 RX ADMIN — ONDANSETRON 4 MG: 2 INJECTION INTRAMUSCULAR; INTRAVENOUS at 10:16

## 2022-05-06 NOTE — ED ATTENDING ATTESTATION
5/6/2022  ISury, DO, saw and evaluated the patient  I have discussed the patient with the resident/non-physician practitioner and agree with the resident's/non-physician practitioner's findings, Plan of Care, and MDM as documented in the resident's/non-physician practitioner's note, except where noted  All available labs and Radiology studies were reviewed  I was present for key portions of any procedure(s) performed by the resident/non-physician practitioner and I was immediately available to provide assistance  At this point I agree with the current assessment done in the Emergency Department  I have conducted an independent evaluation of this patient a history and physical is as follows: 42y F here for evaluation of headache and vision concerns  Hx of tbi, migraines, vertigo  Started w/ HA yesterday that felt like previous ha, today w/ some visual complaints - some double vision which is new  No numbness or weakness  Exam WNWD nad, mmm, neck supple, resp non-labored, cv regular rate, abd nd, ext no cce, skin dry, neuro - perrl, c/o double vision w/ lateral gaze L>R, no nystagmus but unable to keep eyes open, ?b/l UE ataxia (?effort related), no other focal neuro findings  ED Course     Labs Reviewed   APTT - Abnormal       Result Value Ref Range Status    PTT 20 (*) 23 - 37 seconds Final    Comment: Therapeutic Heparin Range =  60-90 seconds   COVID19, INFLUENZA A/B, RSV PCR, [facility] - Normal    SARS-CoV-2 Negative  Negative Final    Comment:      INFLUENZA A PCR Negative  Negative Final    Comment:      INFLUENZA B PCR Negative  Negative Final    Comment:      RSV PCR Negative  Negative Final    Comment:      Narrative:     FOR PEDIATRIC PATIENTS - copy/paste COVID Guidelines URL to browser: [email or web address] [email or web address]  [email or web address]    SARS-CoV-2 assay is a Nucleic Acid Amplification assay intended for the  qualitative detection of nucleic acid from SARS-CoV-2 in nasopharyngeal  swabs  Results are for the presumptive identification of SARS-CoV-2 RNA  Positive results are indicative of infection with SARS-CoV-2, the virus  causing COVID-19, but do not rule out bacterial infection or co-infection  with other viruses  Laboratories within the [place] and its  territories are required to report all positive results to the appropriate  public health authorities  Negative results do not preclude SARS-CoV-2  infection and should not be used as the sole basis for treatment or other  patient management decisions  Negative results must be combined with  clinical observations, patient history, and epidemiological information  This test has not been FDA cleared or approved  This test has been authorized by FDA under an Emergency Use Authorization  (EUA)  This test is only authorized for the duration of time the  declaration that circumstances exist justifying the authorization of the  emergency use of an in vitro diagnostic tests for detection of SARS-CoV-2  virus and/or diagnosis of COVID-19 infection under section 564(b)(1) of  the Act, 21 U  S C  083XPM-9(Z)(9), unless the authorization is terminated  or revoked sooner  The test has been validated but independent review by FDA  and CLIA is pending  Test performed using [employer] GeneXpert: This RT-PCR assay targets N2,  a region unique to SARS-CoV-2  A conserved region in the E-gene was chosen  for pan-Sarbecovirus detection which includes SARS-CoV-2     CBC AND PLATELET - Normal    WBC 5 21  4 31 - 10 16 Thousand/uL Final    RBC 4 01  3 81 - 5 12 Million/uL Final    Hemoglobin 12 4  11 5 - 15 4 g/dL Final    Hematocrit 36 7  34 8 - 46 1 % Final    MCV 92  82 - 98 fL Final    MCH 30 9  26 8 - 34 3 pg Final    MCHC 33 8  31 4 - 37 4 g/dL Final    RDW 11 9  11 6 - 15 1 % Final    Platelets 283  057 - 390 Thousands/uL Final    MPV 9 2  8 9 - 12 7 fL Final   PROTIME-INR - Normal    Protime 12 7  11 6 - 14 5 seconds Final    INR 0 98  0 84 - 1 19 Final   HS TROPONIN I 0HR - Normal    hs TnI 0hr 2  "Refer to ACS Flowchart"- see link ng/L Final    Comment:                                              Initial (time 0) result  If >=50 ng/L, Myocardial injury suggested ;  Type of myocardial injury and treatment strategy  to be determined  If 5-49 ng/L, a delta result at 2 hours and or 4 hours will be needed to further evaluate  If <4 ng/L, and chest pain has been >3 hours since onset, patient may qualify for discharge based on the HEART score in the ED  If <5 ng/L and <3hours since onset of chest pain, a delta result at 2 hours will be needed to further evaluate  HS Troponin 99th Percentile URL of a Health Population=12 ng/L with a 95% Confidence Interval of 8-18 ng/L  Second Troponin (time 2 hours)  If calculated delta >= 20 ng/L,  Myocardial injury suggested ; Type of myocardial injury and treatment strategy to be determined  If 5-49 ng/L and the calculated delta is 5-19 ng/L, consult medical service for evaluation  Continue evaluation for ischemia on ecg and other possible etiology and repeat hs troponin at 4 hours  If delta is <5 ng/L at 2 hours, consider discharge based on risk stratification via the HEART score (if in ED), or ROMMEL risk score in IP/Observation  HS Troponin 99th Percentile URL of a Health Population=12 ng/L with a 95% Confidence Interval of 8-18 ng/L     POCT GLUCOSE - Normal    POC Glucose 91  65 - 140 mg/dl Final   BASIC METABOLIC PANEL    Sodium 793  136 - 145 mmol/L Final    Potassium 4 1  3 5 - 5 3 mmol/L Final    Chloride 105  100 - 108 mmol/L Final    CO2 26  21 - 32 mmol/L Final    ANION GAP 7  4 - 13 mmol/L Final    BUN 13  5 - 25 mg/dL Final    Creatinine 0 88  0 60 - 1 30 mg/dL Final    Comment: Standardized to IDMS reference method    Glucose 90  65 - 140 mg/dL Final    Comment: If the patient is fasting, the ADA then defines impaired fasting glucose as > 100 mg/dL and diabetes as > or equal to 123 mg/dL  Specimen collection should occur prior to Sulfasalazine administration due to the potential for falsely depressed results  Specimen collection should occur prior to Sulfapyridine administration due to the potential for falsely elevated results  Calcium 8 6  8 3 - 10 1 mg/dL Final    eGFR 83  ml/min/1 73sq m Final    Narrative:     [place] guidelines for Chronic Kidney Disease (CKD):     Stage 1 with normal or high GFR (GFR > 90 mL/min/1 73 square meters)    Stage 2 Mild CKD (GFR = 60-89 mL/min/1 73 square meters)    Stage 3A Moderate CKD (GFR = 45-59 mL/min/1 73 square meters)    Stage 3B Moderate CKD (GFR = 30-44 mL/min/1 73 square meters)    Stage 4 Severe CKD (GFR = 15-29 mL/min/1 73 square meters)    Stage 5 End Stage CKD (GFR <15 mL/min/1 73 square meters)  Note: GFR calculation is accurate only with a steady state creatinine     CTA stroke alert (head/neck)   Final Result      Negative CTA head and neck for large vessel occlusion, dissection, aneurysm, or high-grade stenosis  New persistent asymmetrically prominent right superior orbital vein, which may represent orbital venous varix  Findings were directly discussed with Anthony Ocasio at 9:48 AM                         Workstation performed: [id number]         CT stroke alert brain   Final Result      No acute intracranial abnormality  New asymmetrically prominent right superior orbital vein, which may represent orbital venous varix  Findings were directly discussed with Anthony Ocasio at 9:44 AM       Workstation performed: [id number]               Critical Care Time  Procedures    1  Double vision  Consult to Neurology    Consult to Neurology   2   Acute intractable headache, unspecified headache type  Consult to Neurology    Consult to Neurology     Time reflects when diagnosis was documented in both MDM as applicable and the Disposition within this note     Time User Action Codes Description Comment    5/6/2022  9:22 AM Jaelyn Soni Add [H53 2] Double vision     5/6/2022  9:22 AM Jaelyn Soni Add [R51 9] Acute intractable headache, unspecified headache type       ED Disposition     ED Disposition Condition Date/Time Comment    Discharge Stable Fri May 6, 2022 11:26 AM [facility] discharge to home/self care              Follow-up Information     Follow up With Specialties Details Why Irnia Matias MD Neurology   [address]  [phone]

## 2022-05-06 NOTE — PATIENT INSTRUCTIONS
If when you obtain your Vitamin B12 it is on the low end of normal, I recommend repletion below 400  I recommend adding over-the-counter vitamin B12 1000 mcg daily sublingual (better absorption than pill)    Establish care with counseling     Referral to sleep medicine placed 3/29/22 - [phone]    Follow up with eye doctor for dilated eye exam     We dicussed gradual return to normalcy     Neurosymptoms  org - good website for functional neurologic disorder     Headache/migraine treatment:   Abortive medications (for immediate treatment of a headache): It is ok to take ibuprofen, acetaminophen or naproxen (Advil, Tylenol,  Aleve, Excedrin) if they help your headaches you should limit these to No more than 3 times a week to avoid medication overuse/rebound headaches  For your more moderate to severe migraines take this medication early   Maxalt (rizatriptan) 10mg tabs - take one at the onset of headache  May repeat one time after 1-2 hours if pain has not resolved  (Max 2 a day and 9 a month)       Prescription preventive medications for headaches/migraines   (to take every day to help prevent headaches - not to take at the time of headache):  [x] trial of amitriptyline 10 mg nightly for 1 week followed by increasing by 10 mg each week until reach 50 mg  Discussed proper use, possible side effects and risks  *Typically these types of medications take time untill you see the benefit, although some may see improvement in days, often it may take weeks, especially if the medication is being titrated up to a beneficial level  Please contact us if there are any concerns or questions regarding the medication  Lifestyle Recommendations:  [x] SLEEP - Maintain a regular sleep schedule: Adults need at least 7-8 hours of uninterrupted a night   Maintain good sleep hygiene:  Going to bed and waking up at consistent times, avoiding excessive daytime naps, avoiding caffeinated beverages in the evening, avoid excessive stimulation in the evening and generally using bed primarily for sleeping  One hour before bedtime would recommend turning lights down lower, decreasing your activity (may read quietly, listen to music at a low volume)  When you get into bed, should eliminate all technology (no texting, emailing, playing with your phone, iPad or tablet in bed)  [x] HYDRATION - Maintain good hydration  Drink  2L of fluid a day (4 typical small water bottles)  [x] DIET - Maintain good nutrition  In particular don't skip meals and try and eat healthy balanced meals regularly  [x] TRIGGERS - Look for other triggers and avoid them: Limit caffeine to 1-2 cups a day or less  Avoid dietary triggers that you have noticed bring on your headaches (this could include aged cheese, peanuts, MSG, aspartame and nitrates)  [x] EXERCISE - physical exercise as we all know is good for you in many ways, and not only is good for your heart, but also is beneficial for your mental health, cognitive health and  chronic pain/headaches  I would encourage at the least 5 days of physical exercise weekly for at least 30 minutes  Education and Follow-up  [x] Please call with any questions or concerns  Of course if any new concerning symptoms go to the emergency department    [x] Follow up [facility] Malhotra 6/28/22 and with Dr Janes Archer in August

## 2022-05-06 NOTE — PROGRESS NOTES
Review of Systems   Constitutional: Negative for appetite change and fever  HENT: Negative  Negative for hearing loss, tinnitus, trouble swallowing and voice change  Eyes: Positive for visual disturbance  Negative for photophobia and pain  Respiratory: Negative  Negative for shortness of breath  Cardiovascular: Negative  Negative for palpitations  Gastrointestinal: Positive for nausea  Negative for vomiting  Endocrine: Negative  Negative for cold intolerance  Genitourinary: Negative  Negative for dysuria, frequency and urgency  Musculoskeletal: Positive for neck pain  Negative for myalgias  Skin: Negative  Negative for rash  Neurological: Positive for dizziness, weakness and headaches  Negative for tremors, seizures, syncope, facial asymmetry, speech difficulty, light-headedness and numbness  Hematological: Negative  Does not bruise/bleed easily  Psychiatric/Behavioral: Negative  Negative for confusion, hallucinations and sleep disturbance  All other systems reviewed and are negative

## 2022-05-06 NOTE — ASSESSMENT & PLAN NOTE
Mukul Dean is a 44 y o  female with migraines, multiple prior concussions, vertigo, anxiety depression who presents to [facility] ED on 05/06/2022 as a stroke alert with headache and double vision  Migraine with new horizontal diplopia in the setting of migraine history  NIHSS of 1 for RLE shakiness  CT head and CTA head and neck unremarkable for acute intracranial abnormalities or evidence of large vessel occlusion/critical stenosis  TPA Decision: Patient not a TPA candidate as patient was outside of appropriate time window and low suspicion for stroke  Strong suspicion for complex migraine   Low suspicion for acute infarct, would not recommend placing on stroke pathway     Plan:  - Do not recommend admitting on stroke pathway; no need for further neuro imaging  - Recommend sumatriptan 50 mg PO x1  - Recommend Zofran 4 mg x1  - IV fluids  - Recommend discharging rather quickly from the ED if patient has improvement with the above medications as patient has a scheduled appointment with her outpatient neurologist today 05/06/2022 at 2:30 PM

## 2022-05-06 NOTE — DISCHARGE INSTRUCTIONS
Please make it to your neurology appointment today  If you develop new or worsening symptoms, please return to the Emergency Department for further evaluation

## 2022-05-06 NOTE — ED PROVIDER NOTES
History  Chief Complaint   Patient presents with    Visual Field Change     patient reports at 0800 developed a significant headache with visual disturbance described a "cracking in vision" denies recent head injury  +light sensitivity     Patient is a 43 y/o F with PMH TBI presenting with visual field change  Pt states all day yesterday she was dealing with a typical migraine type headache for her, took naproxen with some relief but went to bed still with headache  Woke up this morning around 8am with worsened bitemporal, band type headache, rated 7/10  This was associated now with visual disturbance  States close up vision appears doubled and "stuttering" while distance vision is more normal appearing  States she's had ongoing issues with severe photosensitivity but has never had double vision like this before  Has had vision changes post concussion but denies recent head trauma  No numbness/tingling/sesnsory/motor changes  Denies vertigo symptoms  Mother feels maybe she is having more word-finding difficulties today but speech sounds normal to her  Prior to Admission Medications   Prescriptions Last Dose Informant Patient Reported? Taking? Multiple Vitamins-Minerals (ONE-A-DAY WOMENS PO)  Self Yes No   Sig: Take by mouth   meclizine (ANTIVERT) 25 mg tablet  Self No No   Sig: Take 1 tablet (25 mg total) by mouth 3 (three) times a day as needed for dizziness for up to 20 doses   Patient not taking: Reported on 3/29/2022    rizatriptan (MAXALT) 10 MG tablet  Self No No   Sig: Take 1 tablet (10 mg total) by mouth once as needed for migraine May repeat in 2 hours if needed  Max 2/24 hours, 9/month     Patient not taking: Reported on 4/28/2022       Facility-Administered Medications: None       Past Medical History:   Diagnosis Date    Concussion     Edema of left lower extremity 7/12/2021    TBI (traumatic brain injury) [facility])     [id number] stated by patient       Past Surgical History:   Procedure Laterality Date    CHOLECYSTECTOMY      WISDOM TOOTH EXTRACTION         Family History   Problem Relation Age of Onset    Cancer Mother     Heart disease Father      I have reviewed and agree with the history as documented  E-Cigarette/Vaping    E-Cigarette Use Never User      E-Cigarette/Vaping Substances    Nicotine No     THC No     CBD No     Flavoring No     Other No     Unknown No      Social History     Tobacco Use    Smoking status: Never Smoker    Smokeless tobacco: Never Used   Vaping Use    Vaping Use: Never used   Substance Use Topics    Alcohol use: Never    Drug use: Never        Review of Systems   Constitutional: Negative for chills and fever  HENT: Negative for ear pain and sore throat  Eyes: Positive for visual disturbance  Negative for pain  Respiratory: Negative for cough and shortness of breath  Cardiovascular: Negative for chest pain and palpitations  Gastrointestinal: Negative for abdominal pain and vomiting  Genitourinary: Negative for dysuria and hematuria  Musculoskeletal: Negative for arthralgias and back pain  Skin: Negative for color change and rash  Neurological: Positive for headaches  Negative for seizures and syncope  All other systems reviewed and are negative        Physical Exam  ED Triage Vitals   Temperature Pulse Respirations Blood Pressure SpO2   05/06/22 0850 05/06/22 0849 05/06/22 0849 05/06/22 0850 05/06/22 0849   98 3 °F (36 8 °C) 69 17 126/57 98 %      Temp Source Heart Rate Source Patient Position - Orthostatic VS BP Location FiO2 (%)   05/06/22 0850 05/06/22 0849 05/06/22 0925 05/06/22 0849 --   Oral Monitor Lying Right arm       Pain Score       05/06/22 0849       8             Orthostatic Vital Signs  Vitals:    05/06/22 0940 05/06/22 0955 05/06/22 1010 05/06/22 1137   BP: 127/68 133/71 118/59 125/60   Pulse: 68 68 62 67   Patient Position - Orthostatic VS: Lying Lying Lying Lying       Physical Exam  Vitals and nursing note reviewed  Constitutional:       General: She is not in acute distress  Appearance: She is not ill-appearing  HENT:      Head: Normocephalic and atraumatic  Right Ear: External ear normal       Left Ear: External ear normal       Nose: Nose normal       Mouth/Throat:      Pharynx: Oropharynx is clear  Eyes:      General: No scleral icterus  Right eye: No discharge  Left eye: No discharge  Extraocular Movements: Extraocular movements intact  Conjunctiva/sclera: Conjunctivae normal       Pupils: Pupils are equal, round, and reactive to light  Comments: Pt with difficulty with gaze to the left though can be overcome   Cardiovascular:      Rate and Rhythm: Normal rate and regular rhythm  Pulses: Normal pulses  Heart sounds: Normal heart sounds  No murmur heard  No friction rub  No gallop  Pulmonary:      Effort: Pulmonary effort is normal  No respiratory distress  Breath sounds: Normal breath sounds  No wheezing, rhonchi or rales  Abdominal:      General: Abdomen is flat  There is no distension  Palpations: Abdomen is soft  Tenderness: There is no abdominal tenderness  There is no guarding or rebound  Musculoskeletal:         General: No deformity  Normal range of motion  Cervical back: Normal range of motion  Right lower leg: No edema  Left lower leg: No edema  Skin:     General: Skin is warm and dry  Capillary Refill: Capillary refill takes less than 2 seconds  Findings: No rash  Neurological:      General: No focal deficit present  Mental Status: She is alert and oriented to person, place, and time  Gait: Gait normal       Comments: FTN with moderate ataxia bilaterally  Visual fields with double vision in both eyes  No focal arm or leg findings      Psychiatric:         Mood and Affect: Mood normal          ED Medications  Medications   iohexol (OMNIPAQUE) 350 MG/ML injection (SINGLE-DOSE) 85 mL (85 mL Intravenous Given 5/6/22 0948)   SUMAtriptan (IMITREX) tablet 50 mg (50 mg Oral Given 5/6/22 1016)   ondansetron (ZOFRAN) injection 4 mg (4 mg Intravenous Given 5/6/22 1016)   sodium chloride 0 9 % bolus 1,000 mL (0 mL Intravenous Stopped 5/6/22 1130)       Diagnostic Studies  Results Reviewed     Procedure Component Value Units Date/Time    COVID/FLU/RSV - 2 hour TAT [[id number]]  (Normal) Collected: 05/06/22 0943    Lab Status: Final result Specimen: Nares from Nose Updated: 05/06/22 1113     SARS-CoV-2 Negative     INFLUENZA A PCR Negative     INFLUENZA B PCR Negative     RSV PCR Negative    Narrative:      FOR PEDIATRIC PATIENTS - copy/paste COVID Guidelines URL to browser: [email or web address]  [email or web address]    SARS-CoV-2 assay is a Nucleic Acid Amplification assay intended for the  qualitative detection of nucleic acid from SARS-CoV-2 in nasopharyngeal  swabs  Results are for the presumptive identification of SARS-CoV-2 RNA  Positive results are indicative of infection with SARS-CoV-2, the virus  causing COVID-19, but do not rule out bacterial infection or co-infection  with other viruses  Laboratories within the [place] and its  territories are required to report all positive results to the appropriate  public health authorities  Negative results do not preclude SARS-CoV-2  infection and should not be used as the sole basis for treatment or other  patient management decisions  Negative results must be combined with  clinical observations, patient history, and epidemiological information  This test has not been FDA cleared or approved  This test has been authorized by FDA under an Emergency Use Authorization  (EUA)   This test is only authorized for the duration of time the  declaration that circumstances exist justifying the authorization of the  emergency use of an in vitro diagnostic tests for detection of SARS-CoV-2  virus and/or diagnosis of COVID-19 infection under section 564(b)(1) of  the Act, 21 U  S C  004PWI-2(V)(6), unless the authorization is terminated  or revoked sooner  The test has been validated but independent review by FDA  and CLIA is pending  Test performed using [employer] GeneXpert: This RT-PCR assay targets N2,  a region unique to SARS-CoV-2  A conserved region in the E-gene was chosen  for pan-Sarbecovirus detection which includes SARS-CoV-2      HS Troponin 0hr (reflex protocol) [[id number]]  (Normal) Collected: 05/06/22 0943    Lab Status: Final result Specimen: Blood from Arm, Right Updated: 05/06/22 1036     hs TnI 0hr 2 ng/L     Protime-INR [[id number]]  (Normal) Collected: 05/06/22 0943    Lab Status: Final result Specimen: Blood from Arm, Right Updated: 05/06/22 1025     Protime 12 7 seconds      INR 0 98    APTT [[id number]]  (Abnormal) Collected: 05/06/22 0943    Lab Status: Final result Specimen: Blood from Arm, Right Updated: 05/06/22 1025     PTT 20 seconds     Basic metabolic panel [[id number]] Collected: 05/06/22 0943    Lab Status: Final result Specimen: Blood from Arm, Right Updated: 05/06/22 1021     Sodium 138 mmol/L      Potassium 4 1 mmol/L      Chloride 105 mmol/L      CO2 26 mmol/L      ANION GAP 7 mmol/L      BUN 13 mg/dL      Creatinine 0 88 mg/dL      Glucose 90 mg/dL      Calcium 8 6 mg/dL      eGFR 83 ml/min/1 73sq m     Narrative:      Angelica guidelines for Chronic Kidney Disease (CKD):     Stage 1 with normal or high GFR (GFR > 90 mL/min/1 73 square meters)    Stage 2 Mild CKD (GFR = 60-89 mL/min/1 73 square meters)    Stage 3A Moderate CKD (GFR = 45-59 mL/min/1 73 square meters)    Stage 3B Moderate CKD (GFR = 30-44 mL/min/1 73 square meters)    Stage 4 Severe CKD (GFR = 15-29 mL/min/1 73 square meters)    Stage 5 End Stage CKD (GFR <15 mL/min/1 73 square meters)  Note: GFR calculation is accurate only with a steady state creatinine    CBC and Platelet [[id number]]  (Normal) Collected: 05/06/22 0943    Lab Status: Final result Specimen: Blood from Arm, Right Updated: 05/06/22 0953     WBC 5 21 Thousand/uL      RBC 4 01 Million/uL      Hemoglobin 12 4 g/dL      Hematocrit 36 7 %      MCV 92 fL      MCH 30 9 pg      MCHC 33 8 g/dL      RDW 11 9 %      Platelets 684 Thousands/uL      MPV 9 2 fL     Fingerstick Glucose (POCT) [[id number]]  (Normal) Collected: 05/06/22 0929    Lab Status: Final result Updated: 05/06/22 0938     POC Glucose 91 mg/dl                  CTA stroke alert (head/neck)   Final Result by Krupa Berman MD (05/06 7428)      Negative CTA head and neck for large vessel occlusion, dissection, aneurysm, or high-grade stenosis  New persistent asymmetrically prominent right superior orbital vein, which may represent orbital venous varix  Findings were directly discussed with Arielle Arce at 9:48 AM                         Workstation performed: [id number]         CT stroke alert brain   Final Result by Krupa Berman MD (16/95 8006)      No acute intracranial abnormality  New asymmetrically prominent right superior orbital vein, which may represent orbital venous varix  Findings were directly discussed with Arielle Arce at 9:44 AM       Workstation performed: [id number]               Procedures  Procedures      ED Course                                       MDM  Number of Diagnoses or Management Options  Acute intractable headache, unspecified headache type  Double vision  Diagnosis management comments: Patient is a 43 y/o F with hx TBI and migraines presenting with migraine headache associated with double vision  Ddx CVA vs complex migraine  Given sudden onset double vision and ataxia, will call stroke alert, LKN 8am as pt states she woke up and then developed symptoms making LKN 8am  CT and CTA read as w/o acute findings   D/w neurologist at bedside who felt most likely pt with complex migraine, can be discharged after receiving imitrex since pt has outpatient neurology appointment this afternoon  Pt agreeable with plan, strict return precautions given  Disposition  Final diagnoses:   Double vision   Acute intractable headache, unspecified headache type     Time reflects when diagnosis was documented in both MDM as applicable and the Disposition within this note     Time User Action Codes Description Comment    5/6/2022  9:22 AM Eliel Muñoz Add [H53 2] Double vision     5/6/2022  9:22 AM Eliel Muñoz Add [R51 9] Acute intractable headache, unspecified headache type       ED Disposition     ED Disposition Condition Date/Time Comment    Discharge Stable Fri May 6, 2022 11:26 AM Megan Phillips discharge to home/self care  Follow-up Information     Follow up With Specialties Details Why Contact Info    Edmund Mohr MD Neurology   [address]  [phone]            Discharge Medication List as of 5/6/2022 11:27 AM      CONTINUE these medications which have NOT CHANGED    Details   Multiple Vitamins-Minerals (ONE-A-DAY WOMENS PO) Take by mouth, Historical Med      meclizine (ANTIVERT) 25 mg tablet Take 1 tablet (25 mg total) by mouth 3 (three) times a day as needed for dizziness for up to 20 doses, Starting Tue 2/8/2022, Normal      rizatriptan (MAXALT) 10 MG tablet Take 1 tablet (10 mg total) by mouth once as needed for migraine May repeat in 2 hours if needed  Max 2/24 hours, 9/month , Starting Tue 3/29/2022, Normal           No discharge procedures on file  PDMP Review     None           ED Provider  Attending physically available and evaluated Megan Phillips  I managed the patient along with the ED Attending      Electronically Signed by         Trina Mo MD  05/06/22 9431

## 2022-05-06 NOTE — CONSULTS
Consultation - Stroke   Mandi Oconnell 44 y o  female MRN: [id number]  Unit/Bed#: ED 19 Encounter: [id number]      Assessment/Plan   Migraine  Assessment & Plan  Mandi Oconnell is a 44 y o  female with migraines, multiple prior concussions, vertigo, anxiety depression who presents to [facility] ED on 05/06/2022 as a stroke alert with headache and double vision  Migraine with new horizontal diplopia in the setting of migraine history  NIHSS of 1 for RLE shakiness  CT head and CTA head and neck unremarkable for acute intracranial abnormalities or evidence of large vessel occlusion/critical stenosis  TPA Decision: Patient not a TPA candidate as patient was outside of appropriate time window and low suspicion for stroke  Strong suspicion for complex migraine  Low suspicion for acute infarct, would not recommend placing on stroke pathway     Plan:  - Do not recommend admitting on stroke pathway; no need for further neuro imaging  - Recommend sumatriptan 50 mg PO x1  - Recommend Zofran 4 mg x1  - IV fluids  - Recommend discharging rather quickly from the ED if patient has improvement with the above medications as patient has a scheduled appointment with her outpatient neurologist today 05/06/2022 at 2:30 PM    Mandi Oconnell will need follow up in at the next regular appointment with headache attending  She will not require outpatient neurological testing    Patient has a scheduled appointment with outpatient neurologist Dr Shirin Sam today 05/06/2022 at 2:30 PM   Plan to follow-up that this appointment    History of Present Illness     Reason for Consult / Principal Problem: Stoke alert, double vision   Hx and PE limited by: N/A  Patient last known well: Last night 05/05/2022  Stroke alert called: [address] [facility]  Neurology time of arrival: 0931  HPI: Mandi Oconnell is a 44 y o   female with migraines, multiple prior concussions, vertigo, anxiety depression who presents to [facility] ED on 05/06/2022 as a stroke alert with headache and double vision  Patient states she developed a migraine last night which persisted upon waking up this morning 05/06/2022  She states this morning upon waking up at approximately 7:30 AM, she noticed double vision which she has never experienced with her migraines in the past  She states her headache is similar to her previous migraines, however this one is worse in severity  Headache is currently associated with photophobia and nausea prior to coming into the ED  She did not take anything prior to coming into the ED   patient denies noticing any associated weakness or numbness prior to coming into the ED  She states that she felt her right lower extremity was weak upon assessment in the ED  Patient admits to word-finding difficulties, however reports that this is chronic from her prior concussion in 2019  Denies any worsening of her word-finding difficulties  Patient does have a history of migraines, reporting several disabling migraines a month  Patient states she does take NSAIDs at home when she has her migraines  She reports she was prescribed a medication that she is only allowed to take 3 times a month which she reports she has not started yet  Per chart review, patient was prescribed Maxalt mg in outpatient setting headache as an abortive medication  Patient presented to [facility] ED on 05/06/2022 as a stroke alert with headache and double vision  NIHSS of 1 for right lower extremity shakiness and drift  CT head and CTA head and neck unremarkable for acute intracranial abnormalities or evidence of large vessel occlusion or critical stenosis  Patient was not an IV tPA candidate due to patient being outside of appropriate time window and low suspicion for stroke  Decision was made to administer sumatriptan and plan to discharge in hopes of patient making her outpatient neurology appointment today  Patient was agreeable to this plan       Consults    Review of Systems  12 point ROS limited to acuity of condition  Historical Information   Past Medical History:   Diagnosis Date    Concussion     Edema of left lower extremity 7/12/2021    TBI (traumatic brain injury) [facility])     [id number] stated by patient     Past Surgical History:   Procedure Laterality Date    CHOLECYSTECTOMY      WISDOM TOOTH EXTRACTION       Social History   Social History     Substance and Sexual Activity   Alcohol Use Never     Social History     Substance and Sexual Activity   Drug Use Never     E-Cigarette/Vaping    E-Cigarette Use Never User      E-Cigarette/Vaping Substances    Nicotine No     THC No     CBD No     Flavoring No     Other No     Unknown No      Social History     Tobacco Use   Smoking Status Never Smoker   Smokeless Tobacco Never Used     Family History:   Family History   Problem Relation Age of Onset    Cancer Mother     Heart disease Father        Review of previous medical records was completed  Meds/Allergies   all current active meds have been reviewed, current meds:   Current Facility-Administered Medications   Medication Dose Route Frequency    sodium chloride 0 9 % bolus 1,000 mL  1,000 mL Intravenous Once   , PTA meds:   Prior to Admission Medications   Prescriptions Last Dose Informant Patient Reported? Taking? Multiple Vitamins-Minerals (ONE-A-DAY WOMENS PO)  Self Yes No   Sig: Take by mouth   meclizine (ANTIVERT) 25 mg tablet  Self No No   Sig: Take 1 tablet (25 mg total) by mouth 3 (three) times a day as needed for dizziness for up to 20 doses   Patient not taking: Reported on 3/29/2022    rizatriptan (MAXALT) 10 MG tablet  Self No No   Sig: Take 1 tablet (10 mg total) by mouth once as needed for migraine May repeat in 2 hours if needed  Max 2/24 hours, 9/month     Patient not taking: Reported on 4/28/2022       Facility-Administered Medications: None    and     No Known Allergies    Objective   Vitals:Blood pressure 118/59, pulse 62, temperature 98 3 °F (36 8 °C), temperature source Oral, resp  rate 20, weight 128 kg (281 lb 15 5 oz), last menstrual period 05/06/2022, SpO2 97 %  ,Body mass index is 49 95 kg/m²  No intake or output data in the 24 hours ending 05/06/22 1100    Invasive Devices: Invasive Devices  Report    Peripheral Intravenous Line            Peripheral IV 05/06/22 Right Antecubital <1 day              Physical Exam  Vitals and nursing note reviewed  Constitutional:       General: She is not in acute distress  Appearance: Normal appearance  She is obese  She is not ill-appearing, toxic-appearing or diaphoretic  Comments: Sitting up in bed, appears uncomfortable   HENT:      Head: Normocephalic and atraumatic  Eyes:      General: No scleral icterus  Right eye: No discharge  Left eye: No discharge  Extraocular Movements: Extraocular movements intact  Conjunctiva/sclera: Conjunctivae normal       Comments: Difficulty assessing pupils due to light sensitivity   Musculoskeletal:         General: Normal range of motion  Cervical back: Normal range of motion and neck supple  Skin:     General: Skin is warm and dry  Coloration: Skin is not jaundiced or pale  Findings: No bruising, erythema, lesion or rash  Neurological:      Mental Status: She is alert  Psychiatric:         Mood and Affect: Mood normal          Behavior: Behavior normal          Thought Content: Thought content normal          Judgment: Judgment normal        Neurologic Exam     Mental Status   Patient is alert, sitting up in bed, accompanied by mother  Patient appears uncomfortable, wearing sunglasses  Oriented x3  No dysarthria or aphasia noted  Able to follow central and appendicular commands, answers all questions appropriately  Cranial Nerves     CN II   Visual fields full to confrontation  CN III, IV, VI   Nystagmus: none   Upgaze: normal  Downgaze: normal  Conjugate gaze: present    CN V   Facial sensation intact       CN VII   Facial expression full, symmetric  CN VIII   Hearing: intact    CN XII   Tongue deviation: none  Limited eye movement secondary to pain  No evidence of nystagmus  No evidence of dysconjugate gaze  Patient reporting diplopia with right and left horizontal gaze     Motor Exam   Muscle bulk: normal  Overall muscle tone: normal  No drift noted in bilateral upper extremities  Right lower extremity shakiness noted with minimal drift, RLE does not hit the bed within 5 seconds  No drift noted in left lower extremity     Sensory Exam   Light touch normal    No evidence of extinction/neglect on exam     Gait, Coordination, and Reflexes     Tremor   Resting tremor: absent    Reflexes   Right plantar: equivocal  Left plantar: equivocal  No ataxia dysmetria bilateral upper extremity finger-to-nose testing; slow ROM noted in bilateral upper extremities  Bilateral upper and lower extremity reflexes 2+ throughout  No evidence of ankle clonus bilaterally     NIHSS:  1a Level of Consciousness: 0 = Alert   1b  LOC Questions: 0 = Answers both correctly   1c  LOC Commands: 0 = Obeys both correctly   2  Best Gaze: 0 = Normal   3  Visual: 0 = No visual field loss   4  Facial Palsy: 0=Normal symmetric movement   5a  Motor Right Arm: 0=No drift, limb holds 90 (or 45) degrees for full 10 seconds   5b  Motor Left Arm: 0=No drift, limb holds 90 (or 45) degrees for full 10 seconds   6a  Motor Right Le=Drift, limb holds 90 (or 45) degrees but drifts down before full 10 seconds: does not hit bed   6b  Motor Left Le=No drift, limb holds 90 (or 45) degrees for full 10 seconds   7  Limb Ataxia:  0=Absent   8  Sensory: 0=Normal; no sensory loss   9  Best Language:  0=No aphasia, normal   10  Dysarthria: 0=Normal articulation   11   Extinction and Inattention (formerly Neglect): 0=No abnormality   Total Score: 1     Time NIHSS was completed: 0955    Modified Steele Score:  0 (No baseline symptoms/disability)    Lab Results: I have personally reviewed pertinent reports  Recent Results (from the past 24 hour(s))   ECG 12 lead    Collection Time: 05/06/22  9:28 AM   Result Value Ref Range    Ventricular Rate 74 BPM    Atrial Rate 74 BPM    NH Interval 148 ms    QRSD Interval 74 ms    QT Interval 368 ms    QTC Interval 408 ms    P [place] 27 degrees    QRS Axis 52 degrees    T Wave Axis 32 degrees   Fingerstick Glucose (POCT)    Collection Time: 05/06/22  9:29 AM   Result Value Ref Range    POC Glucose 91 65 - 140 mg/dl   Basic metabolic panel    Collection Time: 05/06/22  9:43 AM   Result Value Ref Range    Sodium 138 136 - 145 mmol/L    Potassium 4 1 3 5 - 5 3 mmol/L    Chloride 105 100 - 108 mmol/L    CO2 26 21 - 32 mmol/L    ANION GAP 7 4 - 13 mmol/L    BUN 13 5 - 25 mg/dL    Creatinine 0 88 0 60 - 1 30 mg/dL    Glucose 90 65 - 140 mg/dL    Calcium 8 6 8 3 - 10 1 mg/dL    eGFR 83 ml/min/1 73sq m   CBC and Platelet    Collection Time: 05/06/22  9:43 AM   Result Value Ref Range    WBC 5 21 4 31 - 10 16 Thousand/uL    RBC 4 01 3 81 - 5 12 Million/uL    Hemoglobin 12 4 11 5 - 15 4 g/dL    Hematocrit 36 7 34 8 - 46 1 %    MCV 92 82 - 98 fL    MCH 30 9 26 8 - 34 3 pg    MCHC 33 8 31 4 - 37 4 g/dL    RDW 11 9 11 6 - 15 1 %    Platelets 032 342 - 546 Thousands/uL    MPV 9 2 8 9 - 12 7 fL   Protime-INR    Collection Time: 05/06/22  9:43 AM   Result Value Ref Range    Protime 12 7 11 6 - 14 5 seconds    INR 0 98 0 84 - 1 19   APTT    Collection Time: 05/06/22  9:43 AM   Result Value Ref Range    PTT 20 (L) 23 - 37 seconds   HS Troponin 0hr (reflex protocol)    Collection Time: 05/06/22  9:43 AM   Result Value Ref Range    hs TnI 0hr 2 "Refer to ACS Flowchart"- see link ng/L   ]  Imaging Studies: I have personally reviewed pertinent reports and I have personally reviewed pertinent films in PACS  EKG, Pathology, and Other Studies: I have personally reviewed pertinent reports      VTE Prophylaxis: Patient in the ED, plan to be discharged out of the ED     Dictation voice to text software has been used in the creation of this document  Please consider this in light of any contextual or grammatical errors

## 2022-05-09 NOTE — TELEPHONE ENCOUNTER
Chart reviewed: Pt was seen on 5/6/22   Per office notes- - PCP is managing return to work, she is going back to work on Monday     Closing this enc

## 2022-05-11 ENCOUNTER — OFFICE VISIT (OUTPATIENT)
Dept: PHYSICAL THERAPY | Facility: REHABILITATION | Age: 37
End: 2022-05-11
Payer: COMMERCIAL

## 2022-05-11 DIAGNOSIS — S09.90XD INJURY OF HEAD, SUBSEQUENT ENCOUNTER: Primary | ICD-10-CM

## 2022-05-11 DIAGNOSIS — F43.10 POST-TRAUMATIC STRESS: ICD-10-CM

## 2022-05-11 DIAGNOSIS — G44.319 ACUTE POST-TRAUMATIC HEADACHE, NOT INTRACTABLE: ICD-10-CM

## 2022-05-11 PROCEDURE — 97112 NEUROMUSCULAR REEDUCATION: CPT | Performed by: PHYSICAL THERAPIST

## 2022-05-11 NOTE — PROGRESS NOTES
Daily Note     Today's date: 2022  Patient name: Alek Anthony  : 1985  MRN: [id number]  Referring provider: Charlie Diamond MD  Dx:   Encounter Diagnosis     ICD-10-CM    1  Injury of head, subsequent encounter  S09  90XD    2  Acute post-traumatic headache, not intractable  G44 319    3  Post-traumatic stress  F43 10                     Subjective: Went to ER earlier in week with double vision, saw neurologist after, deemed to be migraine related  Not having a good day today  Migraine med increasing her sensitivity to light  Objective: See treatment diary below      Assessment: With decreased tolerance for session today due to complaints of dizziness and nausea  Initiated exertion trainning on bike with fair tolerance, limited by increased nausea requiring frequent rest breaks  Pt declined EC activities today  Patient would benefit from continued PT      Plan: Continue per plan of care        Precautions: PTSD, hx of repeated concussions, high symptom irritability     HEP: VORx1, saccades, static balance FTEC    Manuals 4/26 5/3 5/11                                                              Neuro Re-Ed             FGA  Performed           DHI   Performed           VORx1  instructed for HEP 15"x2 ea seated 15"x2 ea seated          Smooth pursuits             Saccades   Seated 15" x2 ea 15"x2 ea seated          Static balance   FTEC 20"x3 declined          Walking with HTs, H/V   2x30' ea HHA          Ther Ex             6MWT   Performed           Scifit    recumbent L2 x5min (not consistent)                                                                                        Ther Activity                                       Gait Training                                       Modalities

## 2022-05-12 ENCOUNTER — APPOINTMENT (OUTPATIENT)
Dept: PHYSICAL THERAPY | Facility: REHABILITATION | Age: 37
End: 2022-05-12
Payer: COMMERCIAL

## 2022-05-16 ENCOUNTER — APPOINTMENT (OUTPATIENT)
Dept: PHYSICAL THERAPY | Facility: REHABILITATION | Age: 37
End: 2022-05-16
Payer: COMMERCIAL

## 2022-05-18 ENCOUNTER — OFFICE VISIT (OUTPATIENT)
Dept: PHYSICAL THERAPY | Facility: REHABILITATION | Age: 37
End: 2022-05-18
Payer: COMMERCIAL

## 2022-05-18 DIAGNOSIS — S09.90XD INJURY OF HEAD, SUBSEQUENT ENCOUNTER: Primary | ICD-10-CM

## 2022-05-18 DIAGNOSIS — F43.10 POST-TRAUMATIC STRESS: ICD-10-CM

## 2022-05-18 DIAGNOSIS — G44.319 ACUTE POST-TRAUMATIC HEADACHE, NOT INTRACTABLE: ICD-10-CM

## 2022-05-18 PROCEDURE — 97110 THERAPEUTIC EXERCISES: CPT | Performed by: PHYSICAL THERAPIST

## 2022-05-18 PROCEDURE — 97112 NEUROMUSCULAR REEDUCATION: CPT | Performed by: PHYSICAL THERAPIST

## 2022-05-18 NOTE — PROGRESS NOTES
Daily Note     Today's date: 2022  Patient name: Yolanda Santo  : 1985  MRN: [id number]  Referring provider: Linette Diaz MD  Dx:   Encounter Diagnosis     ICD-10-CM    1  Injury of head, subsequent encounter  S09  90XD    2  Acute post-traumatic headache, not intractable  G44 319    3  Post-traumatic stress  F43 10                     Subjective: Has been having a good day  Came to clinic without cane today  Objective: See treatment diary below      Assessment:  Significant improvement in tolerance and session today  Able to complete more exercises and progressed times and to standing  Most difficulty with EC on foam due to decreased balance  Patient would benefit from continued PT      Plan: Continue per plan of care        Precautions: PTSD, hx of repeated concussions, high symptom irritability     HEP: VORx1, saccades, static balance FTEC    Manuals 4/26 5/3 5/11 5/18                                                             Neuro Re-Ed             FGA  Performed           DHI   Performed           VORx1, H/V instructed for HEP 15"x2 ea seated 15"x2 ea seated standing 2x30s plain         Smooth pursuits             Saccades, H/V  Seated 15" x2 ea 15"x2 ea seated standing 2x30s plain         Static balance   FTEC 20"x3 declined Foam FA EC UE prn 3x30s         Foam HTs, H/V    FA EC 2x30s ea         Walking with HTs, H/V   2x30' ea HHA // bars 2 laps ea         Ther Ex             6MWT   Performed           Scifit    recumbent L2 x5min (not consistent) SciFit L2 x14 min                                                                                       Ther Activity                                       Gait Training                                       Modalities

## 2022-05-23 ENCOUNTER — OFFICE VISIT (OUTPATIENT)
Dept: PHYSICAL THERAPY | Facility: REHABILITATION | Age: 37
End: 2022-05-23
Payer: COMMERCIAL

## 2022-05-23 DIAGNOSIS — F43.10 POST-TRAUMATIC STRESS: ICD-10-CM

## 2022-05-23 DIAGNOSIS — G44.319 ACUTE POST-TRAUMATIC HEADACHE, NOT INTRACTABLE: ICD-10-CM

## 2022-05-23 DIAGNOSIS — S09.90XD INJURY OF HEAD, SUBSEQUENT ENCOUNTER: Primary | ICD-10-CM

## 2022-05-23 PROCEDURE — 97110 THERAPEUTIC EXERCISES: CPT | Performed by: PHYSICAL THERAPIST

## 2022-05-23 PROCEDURE — 97112 NEUROMUSCULAR REEDUCATION: CPT | Performed by: PHYSICAL THERAPIST

## 2022-05-23 NOTE — PROGRESS NOTES
Daily Note     Today's date: 2022  Patient name: Onelia Reyes  : 1985  MRN: [id number]  Referring provider: Ricco Rodriguez MD  Dx:   Encounter Diagnosis     ICD-10-CM    1  Injury of head, subsequent encounter  S09  90XD    2  Acute post-traumatic headache, not intractable  G44 319    3  Post-traumatic stress  F43 10                     Subjective: Has been having a medium day in terms of symtpoms  Feels frustrated at times with her deficits, and is concerned because she lost her job  Objective: See treatment diary below      Assessment: Tolerated treatment fair  Pt less able to tolerate overall exercise volume today compared to previous visit  Needed rest breaks though out due to increase in dizziness, nausea, or headache  Increasing instability with gait towards end of visit needing quad cane for support  Updated HEP encouraging increased walking, static balance training, and standing with oculomotor training  Patient would benefit from continued PT      Plan: Continue per plan of care        Precautions: PTSD, hx of repeated concussions, high symptom irritability     HEP: VORx1 stand, saccades stand, static balance FTEC, walking in home    Manuals 4/26 5/3 5/11 5/18 5/23                                                            Neuro Re-Ed             FGA  Performed           DHI   Performed           VORx1, H/V instructed for HEP 15"x2 ea seated 15"x2 ea seated standing 2x30s plain standing 2x30s plain        Smooth pursuits             Saccades, H/V  Seated 15" x2 ea 15"x2 ea seated standing 2x30s plain standing 2x30s plain        Static balance   FTEC 20"x3 declined Foam FA EC UE prn 3x30s Foam FA EO 3x30"        Foam HTs, H/V    FA EC 2x30s ea         Walking with HTs, H/V   2x30' ea HHA // bars 2 laps ea // bars 3 laps ea        Ther Ex             6MWT   Performed           Scifit    recumbent L2 x5min (not consistent) SciFit L2 x14 min SciFit L1 x10 min Ther Activity                                       Gait Training                                       Modalities Yes

## 2022-05-25 ENCOUNTER — EVALUATION (OUTPATIENT)
Dept: PHYSICAL THERAPY | Facility: REHABILITATION | Age: 37
End: 2022-05-25
Payer: COMMERCIAL

## 2022-05-25 DIAGNOSIS — G44.319 ACUTE POST-TRAUMATIC HEADACHE, NOT INTRACTABLE: ICD-10-CM

## 2022-05-25 DIAGNOSIS — F43.10 POST-TRAUMATIC STRESS: ICD-10-CM

## 2022-05-25 DIAGNOSIS — S09.90XD INJURY OF HEAD, SUBSEQUENT ENCOUNTER: Primary | ICD-10-CM

## 2022-05-25 PROCEDURE — 97112 NEUROMUSCULAR REEDUCATION: CPT | Performed by: PHYSICAL THERAPIST

## 2022-05-25 PROCEDURE — 97110 THERAPEUTIC EXERCISES: CPT | Performed by: PHYSICAL THERAPIST

## 2022-05-25 NOTE — PROGRESS NOTES
Progress Update Note     Today's date: 2022  Patient name: Mary Gutierrez  : 1985  MRN: [id number]  Referring provider: Guy Hernadez MD  Dx:   Encounter Diagnosis     ICD-10-CM    1  Injury of head, subsequent encounter  S09  90XD    2  Acute post-traumatic headache, not intractable  G44 319    3  Post-traumatic stress  F43 10        Start Time: 5821  Stop Time: 7198  Total time in clinic (min): 45 minutes      Subjective: Having a lot of dizziness today  Just lost her job, they let her go due to not being able to work more than part-time hours  Came into clinic with quad cane today  Spent most of the day in bed  Today isn't a good day  Was doing better earlier in the week  Requested music be turned off today  Thought therapy was helping prior to her bad day today and would like to continue  Objective: See treatment diary below  DHI: 58/100    Modified Clinical Test of Sensory Interaction on Balance  15 eyes open firm surface  1 eyes closed firm surface  NT eyes open foam surface  NT eyes closed foam surface    Flowsheet Rows    Flowsheet Row Most Recent Value   Dizziness Handicap Index    Does looking up increase your problem? 2   Because of your problem, do you feel frustrated? 4   Because of your problem, do you restrict your travel for business or recreation? 4   Does walking down the aisle of a supermarket increase your problem? 0   Because of your problem, do you have difficulty getting in or out of bed? 2   Does your problem significantly restrict your participation in social activities ([employer], movies, dancing, parties)? 4   Because of your problem, do you have difficulty reading? 2   Does performing more ambitious activities such as sports, dancing, household chores (sweeping or putting dishes away) increase your problems? 2   Because of your problem, are you afraid to leave your home without having someone accompany you?  2   Because of your problem have you been embarrassed in front of others? 2   Do quick movements of your head increase your problem? 2   Because of your problem, do you avoid heights? 0   Does turning over in bed increase your problem? 2   Because of your problem, is it difficult for you to do strenuous homework or yardwork? 2   Because of your problem, are you afraid people may think you are intoxicated? 2   Because of your problem, is it difficult for you to go for a walk by yourself? 2   Does walking down a sidewalk increase your problem? 2   Because of your problem, is it difficult for you to concentrate? 2   Because of your problem, is it difficult for you to walk around your house in the dark? 4   Because of your problem, are you afraid to stay at home alone? 2   Because of your problem ,do you feel handicapped? 4   Has the problem placed stress on your relationships with members of your family? 2   Because of your problem, are you depressed? 2   Does your problem interfere with your job or household responsibilities? 4   Does bending over increase your problem? 2   Total score 58   Functional Gait Assessment    Gait Level Surface  1   Change in GaiT Speed 1   Gait with horizontal head turns 1   Gait with vertical head turns 1   Gait and pivot tuen  1   Step over obstacle 1   Gait with narrow base of supprt 0   Gait with eyes closed 0   Ambulating backwards 1   Steps 2   Total score  9          Assessment: Progress update completed today with pt having more difficulty due to worsening in symptoms today, potentially from the stress/anxiety of losing her job  No STGs met and no significant improves noted with outcome measures above  Prior to today's session pt was making progress during her sessions as seen by ability to progress her exercises and completing mobility without AD    Pt will benefit from continued PT services needed to address continued impairments of decreased standing balance, increased complaints of dizziness, increased complaints of HA, and reduce fall risk without AD        Plan: Patient would benefit from: skilled physical therapy, OT eval and speech eval  Planned therapy interventions: balance, home exercise program, coordination, therapeutic activities, therapeutic exercise, stretching, strengthening, patient education, postural training, neuromuscular re-education and manual therapy  Frequency: 2x week  Duration in weeks: 8  Plan of Care beginning date: 4/26/2022  Plan of Care expiration date: 7/15/2022     Precautions: PTSD, hx of repeated concussions, high symptom irritability     HEP: VORx1 stand, saccades stand, static balance FTEC, walking in home    Manuals 4/26 5/3 5/11 5/18 5/23 5/25                                                           Neuro Re-Ed             FGA  Performed    performed       [address]   Performed    performed       VORx1, H/V instructed for HEP 15"x2 ea seated 15"x2 ea seated standing 2x30s plain standing 2x30s plain standing 2x30s plain       Smooth pursuits             Saccades, H/V  Seated 15" x2 ea 15"x2 ea seated standing 2x30s plain standing 2x30s plain standing 2x30s plain       Static balance   FTEC 20"x3 declined Foam FA EC UE prn 3x30s Foam FA EO 3x30"        Foam HTs, H/V    FA EC 2x30s ea         Walking with HTs, H/V   2x30' ea HHA // bars 2 laps ea // bars 3 laps ea // bars 2 laps ea       Ther Ex             6MWT   Performed    TBA       Scifit    recumbent L2 x5min (not consistent) SciFit L2 x14 min SciFit L1 x10 min SciFit L2 x10 min                                                                                     Ther Activity                                       Gait Training                                       Modalities

## 2022-05-27 ENCOUNTER — OFFICE VISIT (OUTPATIENT)
Dept: FAMILY MEDICINE CLINIC | Facility: CLINIC | Age: 37
End: 2022-05-27
Payer: COMMERCIAL

## 2022-05-27 VITALS
HEART RATE: 75 BPM | BODY MASS INDEX: 48.55 KG/M2 | SYSTOLIC BLOOD PRESSURE: 118 MMHG | HEIGHT: 63 IN | DIASTOLIC BLOOD PRESSURE: 64 MMHG | OXYGEN SATURATION: 97 % | WEIGHT: 274 LBS

## 2022-05-27 DIAGNOSIS — F43.10 POST-TRAUMATIC STRESS: Primary | ICD-10-CM

## 2022-05-27 DIAGNOSIS — G43.009 MIGRAINE WITHOUT AURA AND WITHOUT STATUS MIGRAINOSUS, NOT INTRACTABLE: ICD-10-CM

## 2022-05-27 PROBLEM — R29.818 DISABILITY DUE TO NEUROLOGICAL DISORDER: Status: ACTIVE | Noted: 2022-05-27

## 2022-05-27 PROCEDURE — 1036F TOBACCO NON-USER: CPT | Performed by: FAMILY MEDICINE

## 2022-05-27 PROCEDURE — 99214 OFFICE O/P EST MOD 30 MIN: CPT | Performed by: FAMILY MEDICINE

## 2022-05-27 PROCEDURE — 3008F BODY MASS INDEX DOCD: CPT | Performed by: FAMILY MEDICINE

## 2022-05-27 NOTE — PATIENT INSTRUCTIONS
Problem List Items Addressed This Visit       Migraine     Patient continuing to have some issues with regard to migraine  Unfortunately, she ran out the Maxalt that she had gotten from Neurology  She was then trying ibuprofen, and we did review about medication overuse headaches  Patient does have follow-up with Neurology coming up  Given that she is having increasing symptoms lately, I would support her being on disability, even if it is temporary  Relevant Orders    Ambulatory Referral to Speech Therapy    Post-traumatic stress - Primary     Continue problem with posttraumatic stress  Please see the notes from Neurology  They feel that that is the most of her symptoms at this point  Again, I do feel that this warrants her to be on disability  She did attempt to have return to work, and it did not work out for her  Her employer also was not willing to work with her on accommodation, therefore I would support her with disability at this point  Again, I would prefer temporary disability, with a an eye towards perhaps 6 months  She may return to work sooner if she improves, but this will be determined more by Neurology  Would also consider follow-up with speech therapy for some the memory issues that she mentioned that were current with posttraumatic stress  Relevant Orders    Ambulatory Referral to Speech Therapy            COVID 19 Instructions    Aimee Barrera was advised to limit contact with others to essential tasks such as getting food, medications, and medical care  Proper handwashing reviewed, and Hand sanitzer when washing is not available  If the patient develops symptoms of COVID 19, the patient should call the office as soon as possible  For 6647-4652 Flu season, it is strongly recommended that Flu Vaccinations be obtained  Virtual Visits:  Arsenio: This works on smart phones (any phone with Internet browsing capability)    You should get a text message when the provider is ready to see you  Click on the link in the text message, and the call should start  You will need to type in your name, and allow camera and microphone access  This is HIPPA compliant, and secure  If you have not already done so, get immunized to COVID 19  We are committed to getting you vaccinated as soon as possible and will be closely following CDC and [facility]  guidelines as they are released and revised  Please refer to our COVID-19 vaccine webpage for the most up to date information on the vaccine and our distribution efforts  This site will also have the most up to date recommendations for COVID booster vaccine  [facility] tn    Call [phone] ([phone]), option 7    OUR NEW LOCATION:    [address], [address] [place], [place] [address], [place], [address]  Fax: [phone]    Lab services and OB/GYN are at this location as well

## 2022-05-27 NOTE — ASSESSMENT & PLAN NOTE
Patient does have what appears to be disability at this point  It does appear to be a temporary 1 given her discussion with Neurology before  Is combination of posttraumatic stress and migraines  She did try to work with her employer, but that did not end up going well  It was because of needing to work on a computer, and having visual problems, and developing further problems as detailed in this note today  At this point, would support her in a 6 month temporary disability, again with looking towards improvement and return to more functional status

## 2022-05-27 NOTE — ASSESSMENT & PLAN NOTE
Continue problem with posttraumatic stress  Please see the notes from Neurology  They feel that that is the most of her symptoms at this point  Again, I do feel that this warrants her to be on disability  She did attempt to have return to work, and it did not work out for her  Her employer also was not willing to work with her on accommodation, therefore I would support her with disability at this point  Again, I would prefer temporary disability, with a an eye towards perhaps 6 months  She may return to work sooner if she improves, but this will be determined more by Neurology  Would also consider follow-up with speech therapy for some the memory issues that she mentioned that were current with posttraumatic stress

## 2022-05-27 NOTE — ASSESSMENT & PLAN NOTE
Patient continuing to have some issues with regard to migraine  Unfortunately, she ran out the Maxalt that she had gotten from Neurology  She was then trying ibuprofen, and we did review about medication overuse headaches  Patient does have follow-up with Neurology coming up  Given that she is having increasing symptoms lately, I would support her being on disability, even if it is temporary

## 2022-05-27 NOTE — PROGRESS NOTES
Assessment and Plan:    Problem List Items Addressed This Visit     Migraine     Patient continuing to have some issues with regard to migraine  Unfortunately, she ran out the Maxalt that she had gotten from Neurology  She was then trying ibuprofen, and we did review about medication overuse headaches  Patient does have follow-up with Neurology coming up  Given that she is having increasing symptoms lately, I would support her being on disability, even if it is temporary  Relevant Orders    Ambulatory Referral to Speech Therapy    Post-traumatic stress - Primary     Continue problem with posttraumatic stress  Please see the notes from Neurology  They feel that that is the most of her symptoms at this point  Again, I do feel that this warrants her to be on disability  She did attempt to have return to work, and it did not work out for her  Her employer also was not willing to work with her on accommodation, therefore I would support her with disability at this point  Again, I would prefer temporary disability, with a an eye towards perhaps 6 months  She may return to work sooner if she improves, but this will be determined more by Neurology  Would also consider follow-up with speech therapy for some the memory issues that she mentioned that were current with posttraumatic stress  Relevant Orders    Ambulatory Referral to Speech Therapy                 Diagnoses and all orders for this visit:    Post-traumatic stress  -     Ambulatory Referral to Speech Therapy; Future    Migraine without aura and without status migrainosus, not intractable  -     Ambulatory Referral to Speech Therapy; Future            Subjective:      Patient ID: Landy Kingston is a 44 y o  female  CC:    Chief Complaint   Patient presents with    Follow-up     Pt here to discuss disability  Pt is still with the Chronic dizziness   kw       HPI:    Patient did try to go back to work, but was unable to perform her duties  She was not able to look at screen for 7 5 hour shift  She developed headaches, nausea, blurry vision  She used medications from Neuro, and then Ibuprofen, but no help with this  With RTW, noted increased forgetfulness, and word finding issues  She did not have these problems before  Needed frequent reminders as well  She was not in ST before  Mood has been variable at times  She is more prone to emotional outbursts since the initial incident  She talked to her supervisors, and they (employer) stated that accomodation was NOT going to happen  Remains in PT BIW  She is still looking to get in with (posychology) therapist     Sees neurology as well, next OV June  The following portions of the patient's history were reviewed and updated as appropriate: allergies, current medications, past family history, past medical history, past social history, past surgical history and problem list       Review of Systems   Constitutional: Positive for activity change  HENT: Negative  Eyes: Positive for photophobia and visual disturbance  Respiratory: Negative  Cardiovascular: Negative  Gastrointestinal: Positive for nausea  Genitourinary: Negative  Musculoskeletal: Positive for gait problem  Skin: Negative  Neurological: Positive for dizziness, speech difficulty (Word-finding specifically), light-headedness and headaches  Negative for tremors, seizures, syncope, facial asymmetry, weakness and numbness  Psychiatric/Behavioral: Positive for confusion, decreased concentration and dysphoric mood  Negative for agitation, behavioral problems, hallucinations, self-injury, sleep disturbance and suicidal ideas  The patient is nervous/anxious  The patient is not hyperactive  All other systems reviewed and are negative          Data to review:       Objective:    Vitals:    05/27/22 0850   BP: 118/64   BP Location: Left arm   Patient Position: Sitting   Pulse: 75   SpO2: 97%   Weight: 124 kg (274 lb)   Height: 5' 3" (1 6 m)        Physical Exam  Vitals and nursing note reviewed  Constitutional:       Appearance: Normal appearance  HENT:      Head: Normocephalic  Cardiovascular:      Rate and Rhythm: Normal rate and regular rhythm  Pulses: Normal pulses  Carotid pulses are 2+ on the right side and 2+ on the left side  Heart sounds: Normal heart sounds  No murmur heard  No friction rub  No gallop  Pulmonary:      Effort: Pulmonary effort is normal  No respiratory distress  Breath sounds: Normal breath sounds  No stridor  No wheezing, rhonchi or rales  Chest:      Chest wall: No tenderness  Neurological:      Mental Status: She is alert  Cranial Nerves: Cranial nerves are intact  Sensory: Sensation is intact  Motor: Motor function is intact  Gait: Gait abnormal       Comments: With EOMI, patient had increased pain

## 2022-05-31 ENCOUNTER — APPOINTMENT (OUTPATIENT)
Dept: PHYSICAL THERAPY | Facility: REHABILITATION | Age: 37
End: 2022-05-31
Payer: COMMERCIAL

## 2022-06-01 ENCOUNTER — OFFICE VISIT (OUTPATIENT)
Dept: PHYSICAL THERAPY | Facility: REHABILITATION | Age: 37
End: 2022-06-01

## 2022-06-01 DIAGNOSIS — S09.90XD INJURY OF HEAD, SUBSEQUENT ENCOUNTER: Primary | ICD-10-CM

## 2022-06-01 DIAGNOSIS — G44.319 ACUTE POST-TRAUMATIC HEADACHE, NOT INTRACTABLE: ICD-10-CM

## 2022-06-01 DIAGNOSIS — F43.10 POST-TRAUMATIC STRESS: ICD-10-CM

## 2022-06-01 PROCEDURE — 97112 NEUROMUSCULAR REEDUCATION: CPT | Performed by: PHYSICAL THERAPIST

## 2022-06-01 NOTE — PROGRESS NOTES
Daily Note     Today's date: 2022  Patient name: Mercy Acevedo  : 1985  MRN: [id number]  Referring provider: Machelle Hitchcock MD  Dx:   Encounter Diagnosis     ICD-10-CM    1  Injury of head, subsequent encounter  S09  90XD    2  Acute post-traumatic headache, not intractable  G44 319    3  Post-traumatic stress  F43 10                   Subjective: Not a good week  Still doing bad with her dizziness and balance  Objective: See treatment diary below  2932-3202 self-directed    Assessment: Tolerated treatment fair, able to increase VORx1 and saccades to 45sec with improved speed  Continues to require UE support with foam activities due to balance deficits  Patient would benefit from continued PT      Plan: Treadmill NV as pt able       Precautions: PTSD, hx of repeated concussions, high symptom irritability     HEP: VORx1 stand, saccades stand, static balance FTEC, walking in home    Manuals 4/26 5/3 5/11 5/18 5/23 5/25  6/1                                                         Neuro Re-Ed             FGA  Performed    performed       DHI   Performed    performed       VORx1, H/V instructed for HEP 15"x2 ea seated 15"x2 ea seated standing 2x30s plain standing 2x30s plain standing 2x30s plain  standing 2x45s plain     Smooth pursuits             Saccades, H/V  Seated 15" x2 ea 15"x2 ea seated standing 2x30s plain standing 2x30s plain standing 2x30s plain  standing 2x45s plain     Static balance   FTEC 20"x3 declined Foam FA EC UE prn 3x30s Foam FA EO 3x30"   Foam FA EC UE prn 3x30s     Foam HTs, H/V    FA EC 2x30s ea    FA EC 2x30s ea     Walking with HTs, H/V   2x30' ea HHA // bars 2 laps ea // bars 3 laps ea // bars 2 laps ea  // bars 2 laps ea     Ther Ex             6MWT   Performed    TBA       Scifit    recumbent L2 x5min (not consistent) SciFit L2 x14 min SciFit L1 x10 min SciFit L2 x10 min  SciFit L2 x10 min                                                                                   Ther Activity                                       Gait Training                                       Modalities

## 2022-06-02 ENCOUNTER — OFFICE VISIT (OUTPATIENT)
Dept: PHYSICAL THERAPY | Facility: REHABILITATION | Age: 37
End: 2022-06-02

## 2022-06-02 DIAGNOSIS — G44.319 ACUTE POST-TRAUMATIC HEADACHE, NOT INTRACTABLE: ICD-10-CM

## 2022-06-02 DIAGNOSIS — S09.90XD INJURY OF HEAD, SUBSEQUENT ENCOUNTER: Primary | ICD-10-CM

## 2022-06-02 DIAGNOSIS — F43.10 POST-TRAUMATIC STRESS: ICD-10-CM

## 2022-06-02 PROCEDURE — 97112 NEUROMUSCULAR REEDUCATION: CPT | Performed by: PHYSICAL THERAPIST

## 2022-06-02 NOTE — PROGRESS NOTES
Daily Note     Today's date: 2022  Patient name: Chetna Sagastume  : 1985  MRN: [id number]  Referring provider: Harjeet Wells MD  Dx:   Encounter Diagnosis     ICD-10-CM    1  Injury of head, subsequent encounter  S09  90XD    2  Acute post-traumatic headache, not intractable  G44 319    3  Post-traumatic stress  F43 10                    Subjective: Is working on exposure to light by reducing how much she is using the sunglasses  Mostly uses sunglasses only when in therapy due to the florescent lights and other stimulation  Working on getting temporary disability and admits that this has been stressful for her  Objective: See treatment diary below      Assessment: Tolerated treatment fair  Exercise intensity kept similar to preivous visit due to symptoms  Pt demonstrates mild to moderate increase in headaches, dizziness, and nausea needing rest breaks through out  Frequently has to pause to regain balance in standing or walking with exercises, but minimal instability or postural sway noted  Encouraged walking, HEP, and gradual reduction of sunglasses use at home  Plan: Continue per plan of care  Very gradual progression  Incorporate function mobility as able        Precautions: PTSD, hx of repeated concussions, high symptom irritability     HEP: VORx1 stand, saccades stand, static balance FTEC, walking in home    Manuals 4/26 5/3 5/11 5/18 5/23 5/25  6/1 6/2                                                        Neuro Re-Ed             FGA  Performed    performed       [address]   Performed    performed       VORx1, H/V instructed for HEP 15"x2 ea seated 15"x2 ea seated standing 2x30s plain standing 2x30s plain standing 2x30s plain  standing 2x45s plain standing 2x45s plain    Smooth pursuits             Saccades, H/V  Seated 15" x2 ea 15"x2 ea seated standing 2x30s plain standing 2x30s plain standing 2x30s plain  standing 2x45s plain standing 2x45s plain    Static balance   FTEC 20"x3 declined Foam FA EC UE prn 3x30s Foam FA EO 3x30"   Foam FA EC UE prn 3x30s     Foam HTs, H/V    FA EC 2x30s ea    FA EC 2x30s ea     Walking with HTs, H/V   2x30' ea HHA // bars 2 laps ea // bars 3 laps ea // bars 2 laps ea  // bars 2 laps ea // bars 2 laps ea    Ther Ex             6MWT   Performed    TBA       Scifit    recumbent L2 x5min (not consistent) SciFit L2 x14 min SciFit L1 x10 min SciFit L2 x10 min  SciFit L2 x10 min SciFit L2 x10 min    Overground walking         200ft quad cane                                                                     Ther Activity                                       Gait Training                                       Modalities

## 2022-06-03 ENCOUNTER — TELEPHONE (OUTPATIENT)
Dept: FAMILY MEDICINE CLINIC | Facility: CLINIC | Age: 37
End: 2022-06-03

## 2022-06-06 ENCOUNTER — OFFICE VISIT (OUTPATIENT)
Dept: PHYSICAL THERAPY | Facility: REHABILITATION | Age: 37
End: 2022-06-06

## 2022-06-06 DIAGNOSIS — S09.90XD INJURY OF HEAD, SUBSEQUENT ENCOUNTER: Primary | ICD-10-CM

## 2022-06-06 DIAGNOSIS — F43.10 POST-TRAUMATIC STRESS: ICD-10-CM

## 2022-06-06 DIAGNOSIS — G44.319 ACUTE POST-TRAUMATIC HEADACHE, NOT INTRACTABLE: ICD-10-CM

## 2022-06-06 PROCEDURE — 97112 NEUROMUSCULAR REEDUCATION: CPT | Performed by: PHYSICAL THERAPIST

## 2022-06-06 NOTE — PROGRESS NOTES
Daily Note     Today's date: 2022  Patient name: Vashti Sepulveda  : 1985  MRN: [id number]  Referring provider: Lela Harman MD  Dx:   Encounter Diagnosis     ICD-10-CM    1  Injury of head, subsequent encounter  S09  90XD    2  Acute post-traumatic headache, not intractable  G44 319    3  Post-traumatic stress  F43 10      tx by Janelle Goins SPT under direct supervision of Elaine Porter DPT  Subjective: Pt continues to state she is only using glasses primarily in PT for fluorescent lights  Has some dizziness and HA coming into therapy  Objective: See treatment diary below      Assessment: Pt able to consistently self correct balance when ambulating without [facility] AND [place] and was able to self-monitor sxs of dizziness and let therapist know when a rest break was needed  Pt able to perform HTs/HNs, trunk rotation and maintain gaze stability on a moving target (ball) and tolerated them fairly well with rest breaks in-between exercises  Pt will continue to benefit from skilled PT to decrease symptoms of dizziness and improve function  Plan: Continue per plan of care  Very gradual progression  Incorporate function mobility as able        Precautions: PTSD, hx of repeated concussions, high symptom irritability     HEP: VORx1 stand, saccades stand, static balance FTEC, walking in home    Manuals 4/26 5/3 5/11 5/18 5/23 5/25  6/1 6/2 6/6                                                       Neuro Re-Ed             FGA  Performed    performed       [address]   Performed    performed       VORx1, H/V instructed for HEP 15"x2 ea seated 15"x2 ea seated standing 2x30s plain standing 2x30s plain standing 2x30s plain  standing 2x45s plain standing 2x45s plain    Smooth pursuits             Saccades, H/V  Seated 15" x2 ea 15"x2 ea seated standing 2x30s plain standing 2x30s plain standing 2x30s plain  standing 2x45s plain standing 2x45s plain    Ball toss in // bars          Vertical toss up, 10x, 2 sets Bounce pass down 10x 2 sets   Horizontal ball pass standing only 10x, Horizontal ball toss with4 laps, 2 sets      Static balance   FTEC 20"x3 declined Foam FA EC UE prn 3x30s Foam FA EO 3x30"   Foam FA EC UE prn 3x30s     Bend down and touch 19" object <> 17" object          With 1UE for support on mat surface, 4x with 19", 3x with 17"    Overgound ambulation          Overground ambulation with no AD, 300ft    Foam HTs, H/V    FA EC 2x30s ea    FA EC 2x30s ea     Walking with HTs, HN   2x30' ea HHA // bars 2 laps ea // bars 3 laps ea // bars 2 laps ea  // bars 2 laps ea // bars 2 laps ea With WBQC   50 ft HTs 50ft no neck mvt, 60ft HNs, 40ft no neck mvt     Ther Ex             6MWT   Performed    TBA       Scifit    recumbent L2 x5min (not consistent) SciFit L2 x14 min SciFit L1 x10 min SciFit L2 x10 min  SciFit L2 x10 min SciFit L2 x10 min SCIFIT 10 min L3   Overground walking         200ft quad cane                                                                     Ther Activity                                       Gait Training                                       Modalities

## 2022-06-09 ENCOUNTER — APPOINTMENT (OUTPATIENT)
Dept: PHYSICAL THERAPY | Facility: REHABILITATION | Age: 37
End: 2022-06-09

## 2022-06-13 ENCOUNTER — OFFICE VISIT (OUTPATIENT)
Dept: PHYSICAL THERAPY | Facility: REHABILITATION | Age: 37
End: 2022-06-13

## 2022-06-13 DIAGNOSIS — G44.319 ACUTE POST-TRAUMATIC HEADACHE, NOT INTRACTABLE: ICD-10-CM

## 2022-06-13 DIAGNOSIS — F43.10 POST-TRAUMATIC STRESS: ICD-10-CM

## 2022-06-13 DIAGNOSIS — S09.90XD INJURY OF HEAD, SUBSEQUENT ENCOUNTER: Primary | ICD-10-CM

## 2022-06-13 PROCEDURE — 97110 THERAPEUTIC EXERCISES: CPT | Performed by: PHYSICAL THERAPIST

## 2022-06-13 PROCEDURE — 97112 NEUROMUSCULAR REEDUCATION: CPT | Performed by: PHYSICAL THERAPIST

## 2022-06-13 NOTE — PROGRESS NOTES
Daily Note     Today's date: 2022  Patient name: Shruthi Salmeron  : 1985  MRN: [id number]  Referring provider: Yvon Nieves MD  Dx:   Encounter Diagnosis     ICD-10-CM    1  Injury of head, subsequent encounter  S09  90XD    2  Acute post-traumatic headache, not intractable  G44 319    3  Post-traumatic stress  F43 10      tx by KEE Linder under direct supervision of Peter Davalos DPT  Subjective: Pt states HA is a 2/10, dizziness is a 2/10  Pt states she slept less than 2 hours a night  Objective: See treatment diary below       Assessment: Pt demonstrated improved ability to perform habituation exercises with ambulation this session demonstrating decreased instances of LOB and using UEs for support  Pt tends to have more difficulty with vertical exercises ( bending down, or reaching for a ball overhead) > horizontal exercises likely due to increase in sxs of room spinning dizziness  Pt initiated performing occasional HTS with overground ambulation (moving eyes and head at the same time) which is an important skill needed to cross a street or walk in a crowded area  Pt will continue to benefit from skilled OP PT to improve balance and sxs of dizziness  Plan: Continue per plan of care  Very gradual progression  Incorporate function mobility as able         Precautions: PTSD, hx of repeated concussions, high symptom irritability     HEP: VORx1 stand, saccades stand, static balance FTEC, walking in home    Manuals  5/3 5  6 6/                                                       Neuro Re-Ed             FGA  Performed    performed       [address] [place] [address]   Performed    performed       VORx1, H/V H: 20s  V: 20s 15"x2 ea seated 15"x2 ea seated standing 2x30s plain standing 2x30s plain standing 2x30s plain  standing 2x45s plain standing 2x45s plain    Smooth pursuits             Saccades, H/V  Seated 15" x2 ea 15"x2 ea seated standing 2x30s plain standing 2x30s plain standing 2x30s plain  standing 2x45s plain standing 2x45s plain    Ball toss in // bars Vertical toss up, 10x, 2 sets stationary, than 4 laps in the // bars     Bounce toss stationary 20x   occ UE support     Horizontal ball toss stationary 10x, while ambulating in // bars 4 laps            Vertical toss up, 10x, 2 sets   Bounce pass down 10x 2 sets   Horizontal ball pass standing only 10x,     Horizontal ball toss with4 laps, 2 sets      Static balance   FTEC 20"x3 declined Foam FA EC UE prn 3x30s Foam FA EO 3x30"   Foam FA EC UE prn 3x30s     Bend down and touch 19" object <> 17" object In // bars     4x with 19 <> 17" w 1UE support w amb & turn     4x w 19<> 17" w no UEs w amb and turn          With 1UE for support on mat surface, 4x with 19", 3x with 17"    Overgound ambulation 200 ft no AD  200 ft no AD, occasional HTs to the L/R          Overground ambulation with no AD, 300ft    Foam HTs, H/V    FA EC 2x30s ea    FA EC 2x30s ea     Walking with HTs, HN   2x30' ea HHA // bars 2 laps ea // bars 3 laps ea // bars 2 laps ea  // bars 2 laps ea // bars 2 laps ea With WBQC   50 ft HTs 50ft no neck mvt, 60ft HNs, 40ft no neck mvt     Ther Ex             6MWT  Performed    TBA       Scifit    recumbent L2 x5min (not consistent) SciFit L2 x14 min SciFit L1 x10 min SciFit L2 x10 min  SciFit L2 x10 min SciFit L2 x10 min SCIFIT 10 min L3   Overground walking         200ft quad cane                                                                     Ther Activity                                       Gait Training                                       Modalities

## 2022-06-16 ENCOUNTER — APPOINTMENT (OUTPATIENT)
Dept: PHYSICAL THERAPY | Facility: REHABILITATION | Age: 37
End: 2022-06-16

## 2022-06-20 ENCOUNTER — OFFICE VISIT (OUTPATIENT)
Dept: PHYSICAL THERAPY | Facility: REHABILITATION | Age: 37
End: 2022-06-20

## 2022-06-20 DIAGNOSIS — F43.10 POST-TRAUMATIC STRESS: ICD-10-CM

## 2022-06-20 DIAGNOSIS — S09.90XD INJURY OF HEAD, SUBSEQUENT ENCOUNTER: Primary | ICD-10-CM

## 2022-06-20 DIAGNOSIS — G44.319 ACUTE POST-TRAUMATIC HEADACHE, NOT INTRACTABLE: ICD-10-CM

## 2022-06-20 PROCEDURE — 97112 NEUROMUSCULAR REEDUCATION: CPT

## 2022-06-20 PROCEDURE — 97110 THERAPEUTIC EXERCISES: CPT

## 2022-06-20 NOTE — PROGRESS NOTES
Daily Note     Today's date: 2022  Patient name: Marcia Dixon  : 1985  MRN: [id number]  Referring provider: Vicente Sands MD  Dx:   Encounter Diagnosis     ICD-10-CM    1  Injury of head, subsequent encounter  S09  90XD    2  Acute post-traumatic headache, not intractable  G44 319    3  Post-traumatic stress  F43 10        Start Time: 7529  Stop Time: 6482  Total time in clinic (min): 35 minutes     Subjective: Reports has been having hard days  I did not spend too much time active at home  I sometimes feel like my feet are unsteady and sometimes the room spins  Objective: See treatment diary below       Assessment: Focused intervention on increased activity and mobility  Improved functional ability during dual tasking and attention to conversation about meaningful things    (her new cat)  Used very loose Solostep today to promote ambulation without device  Jarred Pepe was very cooperative today and required very limited resting periods    Pt will continue to benefit from skilled OP PT to improve balance  Plan: Continue per plan of care          Precautions: PTSD, hx of repeated concussions, high symptom irritability     HEP: VORx1 stand, saccades stand, static balance FTEC, walking in home    Manuals                                                Neuro Re-Ed           FGA           DHI            VORx1, H/V H: 20s  V: 20s Performing at home    standing 2x45s plain standing 2x45s plain    Smooth pursuits           Saccades, H/V      standing 2x45s plain standing 2x45s plain    Ball toss in // bars Vertical toss up, 10x, 2 sets stationary, than 4 laps in the // bars     Bounce toss stationary 20x   occ UE support     Horizontal ball toss stationary 10x, while ambulating in // bars 4 laps    [employer] toss 50' x 1    Ball bounce  50'    180 degrees turns    50' x 1      Vertical toss up, 10x, 2 sets   Bounce pass down 10x 2 sets   Horizontal ball pass standing only 10x, Horizontal ball toss with4 laps, 2 sets      Static balance       Foam FA EC UE prn 3x30s     Bend down and touch 19" object <> 17" object In // bars     4x with 19 <> 17" w 1UE support w amb & turn     4x w 19<> 17" w no UEs w amb and turn  nv      With 1UE for support on mat surface, 4x with 19", 3x with 17"    Incline negotiation  Forwards/backward x 3  Sidestepping each ways  solostep x 1 each         Overgound ambulation 200 ft no AD  200 ft no AD, occasional HTs to the L/R  200' cg    solostep 100' x 1    Solostep head turns/head nods 50' each      Overground ambulation with no AD, 300ft    Foam Beam  Solostep  Forwards 2 laps  Sideways 2 laps         Foam HTs, H/V      FA EC 2x30s     Walking with HTs, HN      // bars 2 laps ea // bars 2 laps ea With WBQC   50 ft HTs 50ft no neck mvt, 60ft HNs, 40ft no neck mvt     Ther Ex           6MWT           Scifit       SciFit L2 x10 min SciFit L2 x10 min SCIFIT 10 min L3   Overground walking       200ft quad cane                                     Modalities

## 2022-06-23 ENCOUNTER — OFFICE VISIT (OUTPATIENT)
Dept: PHYSICAL THERAPY | Facility: REHABILITATION | Age: 37
End: 2022-06-23

## 2022-06-23 DIAGNOSIS — F43.10 POST-TRAUMATIC STRESS: ICD-10-CM

## 2022-06-23 DIAGNOSIS — S09.90XD INJURY OF HEAD, SUBSEQUENT ENCOUNTER: Primary | ICD-10-CM

## 2022-06-23 DIAGNOSIS — G44.319 ACUTE POST-TRAUMATIC HEADACHE, NOT INTRACTABLE: ICD-10-CM

## 2022-06-23 PROCEDURE — 97112 NEUROMUSCULAR REEDUCATION: CPT | Performed by: PHYSICAL THERAPIST

## 2022-06-23 NOTE — PROGRESS NOTES
Daily Note     Today's date: 2022  Patient name: Roman Ochoa  : 1985  MRN: [id number]  Referring provider: Dmitry Oliveira MD  Dx:   Encounter Diagnosis     ICD-10-CM    1  Injury of head, subsequent encounter  S09  90XD    2  Acute post-traumatic headache, not intractable  G44 319    3  Post-traumatic stress  F43 10                    Subjective: Reports feeling dizzy today  Finds that therapy is helpful, but often feels like she is regressing again randomly, which she feels like she is in right now due to higher dizziness  Objective: See treatment diary below       Assessment: Continued to focus on increasing activity tolerance and functional mobility mobility  Pt demonstrated more instances of staggering today with walking and balance training, but was able to continue  Exercise was focused on achieving medium difficulty and avoiding over challenging  Encouraged pt to allow for minimal to moderate symptoms with exercise  Provided continued education through out regarding central sanitization, and hypervigilance of symptoms from maladaptive motor learning  Occasional rest breaks needed through out  Plan: Continue per plan of care  Re-eval next visit         Precautions: PTSD, hx of repeated concussions, high symptom irritability     HEP: VORx1 stand, saccades stand, static balance FTEC, walking in home    Manuals                                                Neuro Re-Ed                                  Tk [place], H/V H: 20s  V: 20s Performing at home    standing 2x45s plain standing 2x45s plain    Smooth pursuits           Saccades, H/V      standing 2x45s plain standing 2x45s plain    Ball toss in // bars Vertical toss up, 10x, 2 sets stationary, than 4 laps in the // bars     Bounce toss stationary 20x   occ UE support     Horizontal ball toss stationary 10x, while ambulating in // bars 4 laps    [employer] toss 50' x 1    Ball bounce  50'    180 degrees turns    50' x 1 Ball toss in [place] 20ft x4         Vertical toss up, 10x, 2 sets   Bounce pass down 10x 2 sets   Horizontal ball pass standing only 10x,     Horizontal ball toss with4 laps, 2 sets      Static balance       Foam FA EC UE prn 3x30s     Bend down and touch 19" object <> 17" object In // bars     4x with 19 <> 17" w 1UE support w amb & turn     4x w 19<> 17" w no UEs w amb and turn  nv      With 1UE for support on mat surface, 4x with 19", 3x with 17"    Incline negotiation  Forwards/backward x 3  Sidestepping each ways  solostep x 1 each Step on/off ramp x5 (stopped due to difficulty today)        Overgound ambulation 200 ft no AD  200 ft no AD, occasional HTs to the L/R  200' cg    solostep 100' x 1    Solostep head turns/head nods 50' each 200ft in SOLO    "Fast" walking 16ft x4  Best: 12s     Overground ambulation with no AD, 300ft    Foam Beam  Solostep  Forwards 2 laps  Sideways 2 laps         Foam HTs, H/V      FA EC 2x30s     Walking with HTs, HN   HTs 20ft x4 SOLO   // bars 2 laps ea // bars 2 laps ea With WBQC   50 ft HTs 50ft no neck mvt, 60ft HNs, 40ft no neck mvt     Walking to [employer]  2# from waist high surface and carry to place on low surface  10ft   x5         Ther Ex           6MWT           Scifit    10 min L3 - 3 5   SciFit L2 x10 min SciFit L2 x10 min SCIFIT 10 min L3   Overground walking       200ft quad cane                                     Modalities

## 2022-06-27 ENCOUNTER — OFFICE VISIT (OUTPATIENT)
Dept: PHYSICAL THERAPY | Facility: REHABILITATION | Age: 37
End: 2022-06-27

## 2022-06-27 DIAGNOSIS — S09.90XD INJURY OF HEAD, SUBSEQUENT ENCOUNTER: Primary | ICD-10-CM

## 2022-06-27 PROCEDURE — 97112 NEUROMUSCULAR REEDUCATION: CPT | Performed by: PHYSICAL THERAPIST

## 2022-06-27 NOTE — PROGRESS NOTES
Daily Note     Today's date: 2022  Patient name: Rivas Lozano  : 1985  MRN: [id number]  Referring provider: Gloria Ibarra MD  Dx:   Encounter Diagnosis     ICD-10-CM    1  Injury of head, subsequent encounter  S09  90XD      tx by Sophia Camacho SPT under direct supervision of Carlito Weems DPT  Subjective: Pt states she had to bend over 3 times today to clean up her cat throw up  She said she felt unsteady, but it helped that she was able to hold onto an object while bending down  Reports only mild HA and dizziness today  Objective: See treatment diary below       Assessment: Pt demonstrated improved tolerance to 180 deg turns today; presenting with only mild dizziness  Pt also demonstrated improved quality of gait when carrying a weight to/ from a 21 inch surface likely due to distraction involved in task  Pt progressed from ascending/descending a 4" step with a WBQC to no UE support, however, presented with increased balance responses and trunk sway when AD was taken away  Continue to focus on allowing pt to get to a mild to moderate sx increase before stopping the activity and allowing the pt to take a break  Pt will continue to benefit from skilled OP PT to improve functional mobility in the home and community setting  Plan: Continue per plan of care  Re-eval next visit          Precautions: PTSD, hx of repeated concussions, high symptom irritability    HEP: VORx1 stand, saccades stand, static balance FTEC, walking in home    Manuals   6 6                                               Neuro Re-Ed                                  Susannah Lang[place], H/V H: 20s  V: 20s Performing at home    standing 2x45s plain standing 2x45s plain    Smooth pursuits           Saccades, H/V      standing 2x45s plain standing 2x45s plain    Ball toss in Vertical toss up, 10x, 2 sets stationary, than 4 laps in the // bars     Bounce toss stationary 20x   occ UE support Horizontal ball toss stationary 10x, while ambulating in // bars 4 laps    [employer] toss 50' x 1    Ball bounce  50'    180 degrees turns    50' x 1      Ball toss over 40ft 10x with 10 turns to the R/L    Vertical toss up, 10x, 2 sets   Bounce pass down 10x 2 sets   Horizontal ball pass standing only 10x,     Horizontal ball toss with4 laps, 2 sets      Static balance       Foam FA EC UE prn 3x30s     Bend down and touch 19" object <> 17" object In // bars     4x with 19 <> 17" w 1UE support w amb & turn     4x w 19<> 17" w no UEs w amb and turn  nv      With 1UE for support on mat surface, 4x with 19", 3x with 17"    Incline negotiation  Forwards/backward x 3  Sidestepping each ways  solostep x 1 each Step on/off ramp x5 (stopped due to difficulty today) Step on/ off ramp  10x    Step on 2 steps, off 2 steps 10x       Overgound ambulation 200 ft no AD  200 ft no AD, occasional HTs to the L/R  200' cg    solostep 100' x 1    Solostep head turns/head nods 50' each 200ft in SOLO    "Fast" walking 16ft x4  Best: 12s 200ft in SOLO    40 ft x 4 with 4 turns to the R/L    Overground ambulation with no AD, 300ft    Foam Beam  Solostep  Forwards 2 laps  Sideways 2 laps         Step up/down     4" step with WBQC 10x, 10 no UEs  6" step with WBQC 10x       Foam HTs, H/V      FA EC 2x30s     Walking with HTs, HN   HTs 20ft x4 SOLO   // bars 2 laps ea // bars 2 laps ea With WBQC   50 ft HTs 50ft no neck mvt, 60ft HNs, 40ft no neck mvt     Walking to [employer]  2# from waist high surface and carry to place on low surface  10ft   x5  Carry 4# from 21" surface to another 21" surface over 20ft x 12       Ther Ex           6MWT           Scifit    10 min L3 - 3 5 10 min L3  SciFit L2 x10 min SciFit L2 x10 min SCIFIT 10 min L3   Overground walking       200ft quad cane                                     Modalities

## 2022-06-28 ENCOUNTER — OFFICE VISIT (OUTPATIENT)
Dept: NEUROLOGY | Facility: CLINIC | Age: 37
End: 2022-06-28

## 2022-06-28 VITALS
WEIGHT: 273.4 LBS | TEMPERATURE: 97.3 F | HEIGHT: 63 IN | SYSTOLIC BLOOD PRESSURE: 121 MMHG | HEART RATE: 68 BPM | DIASTOLIC BLOOD PRESSURE: 58 MMHG | BODY MASS INDEX: 48.44 KG/M2

## 2022-06-28 DIAGNOSIS — F43.10 POST-TRAUMATIC STRESS: ICD-10-CM

## 2022-06-28 DIAGNOSIS — R29.818 NEUROLOGIC COMPLAINT, FUNCTIONAL: ICD-10-CM

## 2022-06-28 DIAGNOSIS — G44.319 ACUTE POST-TRAUMATIC HEADACHE, NOT INTRACTABLE: Primary | ICD-10-CM

## 2022-06-28 DIAGNOSIS — G43.009 MIGRAINE WITHOUT AURA AND WITHOUT STATUS MIGRAINOSUS, NOT INTRACTABLE: ICD-10-CM

## 2022-06-28 PROCEDURE — 99215 OFFICE O/P EST HI 40 MIN: CPT | Performed by: PHYSICIAN ASSISTANT

## 2022-06-28 PROCEDURE — 3008F BODY MASS INDEX DOCD: CPT | Performed by: FAMILY MEDICINE

## 2022-06-28 NOTE — PATIENT INSTRUCTIONS
Establish care with counseling --call    Referral to sleep medicine placed 3/29/22 - [phone]    Follow up with eye doctor for dilated eye exam     We dicussed gradual return to normalcy     Neurosymptoms  org - good website for functional neurologic disorder     Headache/migraine treatment:   Abortive medications (for immediate treatment of a headache): It is ok to take ibuprofen, acetaminophen or naproxen (Advil, Tylenol,  Aleve, Excedrin) if they help your headaches you should limit these to No more than 3 times a week to avoid medication overuse/rebound headaches  For your more moderate to severe migraines take this medication early   Maxalt (rizatriptan) 10mg tabs - take one at the onset of headache  May repeat one time after 1-2 hours if pain has not resolved  (Max 2 a day and 9 a month)       Prescription preventive medications for headaches/migraines   (to take every day to help prevent headaches - not to take at the time of headache):  [x] trial of amitriptyline 10 mg nightly for 1 week followed by increasing by 10 mg each week until reach 50 mg  Discussed proper use, possible side effects and risks  *Typically these types of medications take time untill you see the benefit, although some may see improvement in days, often it may take weeks, especially if the medication is being titrated up to a beneficial level  Please contact us if there are any concerns or questions regarding the medication  Lifestyle Recommendations:  [x] SLEEP - Maintain a regular sleep schedule: Adults need at least 7-8 hours of uninterrupted a night  Maintain good sleep hygiene:  Going to bed and waking up at consistent times, avoiding excessive daytime naps, avoiding caffeinated beverages in the evening, avoid excessive stimulation in the evening and generally using bed primarily for sleeping    One hour before bedtime would recommend turning lights down lower, decreasing your activity (may read quietly, listen to music at a low volume)  When you get into bed, should eliminate all technology (no texting, emailing, playing with your phone, iPad or tablet in bed)  [x] HYDRATION - Maintain good hydration  Drink  2L of fluid a day (4 typical small water bottles)  [x] DIET - Maintain good nutrition  In particular don't skip meals and try and eat healthy balanced meals regularly  [x] TRIGGERS - Look for other triggers and avoid them: Limit caffeine to 1-2 cups a day or less  Avoid dietary triggers that you have noticed bring on your headaches (this could include aged cheese, peanuts, MSG, aspartame and nitrates)  [x] EXERCISE - physical exercise as we all know is good for you in many ways, and not only is good for your heart, but also is beneficial for your mental health, cognitive health and  chronic pain/headaches  I would encourage at the least 5 days of physical exercise weekly for at least 30 minutes  Education and Follow-up  [x] Please call with any questions or concerns  Of course if any new concerning symptoms go to the emergency department    [x] Follow up [facility] 6/28/22 and with Dr Linda Sepulveda in August

## 2022-06-28 NOTE — PROGRESS NOTES
Review of Systems   Constitutional: Negative  Negative for appetite change and fever  HENT: Negative  Negative for hearing loss, tinnitus, trouble swallowing and voice change  Eyes: Positive for photophobia (standard daily)  Negative for pain  Respiratory: Negative  Negative for shortness of breath  Cardiovascular: Negative  Negative for palpitations  Gastrointestinal: Negative  Negative for nausea and vomiting  Endocrine: Negative  Negative for cold intolerance  Genitourinary: Negative  Negative for dysuria, frequency and urgency  Musculoskeletal: Negative  Negative for myalgias and neck pain  Skin: Negative  Negative for rash  Neurological: Positive for dizziness (seeing PT) and speech difficulty (word searching)  Negative for tremors, seizures, syncope, facial asymmetry, weakness, light-headedness, numbness and headaches  Hematological: Negative  Does not bruise/bleed easily  Psychiatric/Behavioral: Negative for confusion, hallucinations and sleep disturbance  Short term memory difficulties  Anything outside of routine

## 2022-06-28 NOTE — PROGRESS NOTES
[facility] Consult  PATIENT:  Alek Anthony  MRN:  [id number]  :  1985  DATE OF SERVICE:  2022  REFERRED BY: No ref  provider found  PMD: Love Coronado MD    Assessment/Plan:   Alek Anthony is a very pleasant  44 y o  female with a past medical history that includes migraine, BMI over 45, vertigo, B12 deficiency, vitamin-D deficiency, insomnia, anxiety, depression, mild degenerative disc disease C4-5 referred here for evaluation of headache  Dr Karolina Larsen initial evaluation 3/29/2022, follow up 2022  [address] [place] [address] 2022     Acute on chronic post-traumatic headache  History of possible concussion - resolved  Chronic daily headache  Migraine without aura and without status migrainosus, not intractable  Post-traumatic stress with  functional neurologic disorder  Deconditioning   We discussed her history of multiple possible concussions although we discussed it is also difficult to diagnose concussion definitively and how posttraumatic headache with migrainous features can often have similar presentation  Please see EMR and HPI for details  Most recently on 2022, she was seated on a couch when the back of the couch collapsed causing her to fall and hit the right side of her head on an end table she thinks, does not remember hitting her head, but had a red conchis at the right cheek area  She reports she does not remember a few seconds of time which we discussed can happen as a stress reaction are related to concussion and had progressively worsening up chronic vertigo and posttraumatic headache with migrainous features  She was evaluated emergency department where noncontrast head CT was unremarkable for acute pathology and she subsequently followed up with primary care provider who kept her out of work and asked her to follow up with Neurology    - As of 2022:  She reports following the injury vertigo actually got much better, she has chronic daily back on headaches for years dating back to at least 03/2020 that is worse about 1 day a week for which she is not interested in prescription preventative, did start trial of rizatriptan for abortive  She also has history of anxiety, depression and some symptoms of posttraumatic stress with functional neurologic disorder with hyper startle response, avoidance behavior and wearing sunglasses indoors, intrusion symptoms, negative impact on cognition and mood and arousal and reactivity changes  Also some symptoms of deconditioning and maladaptive coping  We discussed continue follow-up with PCP and recommended establishing care with a counselor for which I will have our  see if she can help her with a list   She was reassured by this information and we discussed gradual return to normalcy  Referral to sleep medicine for possible sleep apnea contributing to symptoms  - as of 5/6/2022: She reports she continues to have near daily headaches that are mild about 3/10 and worse about 2 times in the past month for which she did not yet try rizatriptan, however did go to ED today instead and sumatriptan plus IV fluids and Zofran helped  She is still working on following up with Sleep Medicine, eye doctor, counseling  She is now interested in prescription preventative and will start trial of amitriptyline with gradual titration up and again recommended taking rizatriptan next migraine   - as of 6/28/2022:  Patient states she has background headaches of a 1/10  Has not had to deal with the issues related to work therefore has not had a bad migraine in some time  Maybe had one 2 weeks ago  Rizatriptan does help to decrease her migraines  Patient has been out of work since May 2022 (after had been out of leave prior)    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 and the workplace didn't accommodate her requests     Workup:  - as of initial visit 03/29/2022 Neurologic assessment reveals normal neurological exam except for depressed mood and affect, tearful, sunglasses indoors, hyper startle response to light touch, allodynia bifrontal forehead  - noncontrast head CT 03/04/2020: No acute intracranial abnormality   - CTA head and neck with without contrast 11/02/2021: Normal CT angiogram of the head and neck  - MRI brain IAC with without contrast 2/9/22: No IAC or CP angle pathology   Unremarkable MRI of the brain  - noncontrast head CT 03/17/2022: No acute intracranial abnormality  - noncontrast head CT 05/06/2022: No acute intracranial abnormality  -CTA head and neck 05/06/2022: Negative CTA head and neck for large vessel occlusion, dissection, aneurysm, or high-grade stenosis  New persistent asymmetrically prominent right superior orbital vein     Preventative:  - we discussed headache hygiene and lifestyle factors that may improve headaches  - restart amitriptyline 10 mg nightly for 1 week followed by increasing by 10 mg each week until reach 50 mg  Discussed proper use, possible side effects and risks  - Past/ failed/contraindicated:  None  - future options:  topiramate, CGRP med     Abortive:  - discussed not taking over-the-counter or prescription pain medications more than 3 days per week to prevent medication overuse/rebound headache  -     rizatriptan (MAXALT) 10 MG tablet; Take 1 tablet (10 mg total) by mouth once as needed for migraine May repeat in 2 hours if needed  Max 2/24 hours, 9/month  Discussed proper use, possible side effects and risks    - Currently on through other providers:  Ibuprofen, meclizine  - Past/ failed/contraindicated:  OTC meds  - past:  Has tolerated steroids in the past, migraine cocktails have brought pain down, sumatriptan in ED helped with IV fluids and Zofran  - future options:  prochlorperazine, Toradol IM or p o , could consider trial for 5 days of Depakote or dexamethasone for prolonged migraine, ubrelvy, reyvow, nurtec     We have discussed concussions and the natural course of recovery  We have discussed that symptoms from a concussion typically take 1-2 weeks to resolve, and although sometimes it can feel like concussion symptoms linger on, at this point these symptoms would be more likely related to contributing factors      - Contributing factors may include:   Post traumatic stress, Prolonged removal from normal routine,  posttraumatic headache,  comorbid injuries, preexisting chronic headaches or migraines,  medication overuse headache, anxiety or depression, stress, deconditioning,  comorbid medical diagnoses  -recommended gradual return of normal cognitive and physical activity with safety precautions  - We discussed that newer research regarding concussion shows that the sooner one returns gradually to their normal physical and cognitive routine, the sooner one tends to recover   Prolonged removal from normal routine and deconditioning have been shown to prolong symptoms and worsen symptoms   - We discussed that sometimes there is a constellation of symptoms that some refer to as "post concussion syndrome," but I prefer not to use this term since that can be misleading and make people think they are still brain injured or "concussed," when the most common and likely etiology this far out from the head trauma is either contributing factors or a form of functional neurologic disorder with mixed symptoms  - We discussed how cognitive issues can have multiple causes and often related to multifactorial etiologies including stress, anxiety,  mood, pain, hypervigilance  and sleep issues and provided reassurance that, it is not likely the cognitive dysfunction is related to concussion at this point    - Safe driving precautions, should not drive at all if feeling sleepy or cognitively not well     * We discussed the most likely therapy to help in these cases would be counseling     Insomnia, snoring, concern for BUCYK--needs to schedule still as of 6/28/2022  -     Ambulatory referral to Sleep Medicine; Future     Patient instructions      Establish care with counseling --call them back to discuss     Referral to sleep medicine placed 3/29/22 - [phone]--please call to schedule once you have insurance     Follow up with eye doctor for dilated eye exam--please call to schedule once you have insurance      We dicussed gradual return to normalcy      Neurosymptoms  org - good website for functional neurologic disorder      Headache/migraine treatment:   Abortive medications (for immediate treatment of a headache): It is ok to take ibuprofen, acetaminophen or naproxen (Advil, Tylenol,  Aleve, Excedrin) if they help your headaches you should limit these to No more than 3 times a week to avoid medication overuse/rebound headaches       For your more moderate to severe migraines take this medication early   Maxalt (rizatriptan) 10mg tabs - take one at the onset of headache  May repeat one time after 1-2 hours if pain has not resolved  (Max 2 a day and 9 a month)      Prescription preventive medications for headaches/migraines   (to take every day to help prevent headaches - not to take at the time of headache):  [x]? restart amitriptyline 10 mg nightly for 1 week followed by increasing by 10 mg each week until reach 50 mg  Discussed proper use, possible side effects and risks      *Typically these types of medications take time untill you see the benefit, although some may see improvement in days, often it may take weeks, especially if the medication is being titrated up to a beneficial level  Please contact us if there are any concerns or questions regarding the medication       Lifestyle Recommendations:  [x]? SLEEP - Maintain a regular sleep schedule: Adults need at least 7-8 hours of uninterrupted a night   Maintain good sleep hygiene:  Going to bed and waking up at consistent times, avoiding excessive daytime naps, avoiding caffeinated beverages in the evening, avoid excessive stimulation in the evening and generally using bed primarily for sleeping  One hour before bedtime would recommend turning lights down lower, decreasing your activity (may read quietly, listen to music at a low volume)  When you get into bed, should eliminate all technology (no texting, emailing, playing with your phone, iPad or tablet in bed)  [x]? HYDRATION - Maintain good hydration  Drink  2L of fluid a day (4 typical small water bottles)  [x]? DIET - Maintain good nutrition  In particular don't skip meals and try and eat healthy balanced meals regularly  [x]? TRIGGERS - Look for other triggers and avoid them: Limit caffeine to 1-2 cups a day or less  Avoid dietary triggers that you have noticed bring on your headaches (this could include aged cheese, peanuts, MSG, aspartame and nitrates)  [x]? EXERCISE - physical exercise as we all know is good for you in many ways, and not only is good for your heart, but also is beneficial for your mental health, cognitive health and  chronic pain/headaches  I would encourage at the least 5 days of physical exercise weekly for at least 30 minutes       Education and Follow-up  [x]? Please call with any questions or concerns  Of course if any new concerning symptoms go to the emergency department  [x]? Follow up  Dr Gui Shepard in August            CC: We had the pleasure of evaluating Aimee Barrera in neurological consultation today  Aimee Barrera is a   left  handed female who presents today for evaluation of headaches       History obtained from patient as well as available medical record review    History of Present Illness:   Interval history as of 06/28/2022:  Is not working due to lack of accomodation  Still doing PT for vertigo and vestibular therapy    she has not yet followed up with counselor as recommended although  did offer list of counselors covered by her insurance, but has someone at PCP office that she is looking into     She has not yet followed up with Sleep Medicine is recommended (due to lack of insurance)  has not yet seen eye doctor for dilated eye exam, due to lack of insurance    Headaches and migraines   She had she continues to have chronic near daily headaches that are typically mild at about 1/10, but only severe are 2+ a month  Didn't remember to continue to take the amitriptyline at bedtime  Got to 20 mg and then stopped  No side effects  No ER since last visit    Interval history as of 5/6/2022  - PCP is managing return to work, she is going back to work on Monday   -she had VNG which showed some sort of peripheral abnormality on the left contributing to her vertigo and is back with vestibular therapy  - she continues to struggle with deconditioning to lights and activities  -she has not yet followed up with counselor as recommended although  did offer list of counselors covered by her insurance, but has someone at PCP office that she is looking into   - She has not yet followed up with Sleep Medicine is recommended  - has not yet seen eye doctor for dilated eye exam, planning for next month      Headaches and migraines   She had she continues to have chronic near daily headaches that are typically mild at about 3/10, but only one significant migraine 6/10 yesterday where she took aleve and today was worse and went with ED      She went to the emergency department this morning due to migraine with diplopia, did not try rizatriptan and had improvement with sumatriptan a migraine cocktail with significant improvement in symptoms  She in fact saw Neurology while in the emergency department and CTA head and neck and CT head were performed      Has mostly not been taking any medications for headaches  Headache was worse yesterday and she had a naproxen in early afternoon  She has not been taking maxalt at home  Has been working on exposure therapy at home with light and screens and is only up to moderate tolerance     Preventative: nothing   Abortive: had not tried Rizatriptan yet        History as of our initial visit 03/29/2022: History of concussion  - 9th grade  States that she hit her head during indoor soccer and perhaps had a few seconds of LOC vs anmesia     - Most of 20s did not have medical insurance and had some hits to the head   - 2/29/2020 hit her head on her car   Didn't have LOC   Headaches, motion and light sensitivity started 20 minutes after  Next time lost balance   - 3/4/2020 when she was stepping out of her car, loss balance, did not hit head   Went to ER and then did therapy afterwards  Rafael Counts her job after this      - Patient started a job in August 2021 after being out of work for 1 5 years  Mary Zelaya feels like this was over stimulating for her  Jomax Zelaya felt noise and light were overwhelming for her      - November of 2021 patient presented to her family doctor with a severe headache that was unrelenting for 1 and half weeks   Patient had stabbing on the left side of her head    dizziness and felt unsteady   She was sent to the emergency room for evaluation via ambulance    CTA of the head and neck was done in the emergency room which was negative   - Since the ER, states that she developed dizziness 1 week before [place] out of nowhere    working from home since 12/27/2021 due to her dizziness     - Does wear her sunglasses frequently during the day indoors   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 the max time without the glasses was 1 hour and then needed the entire rest of the day   States at home uses less, however she did place them on 2 times during our visit  - She has followed with Sports Medicine, physical therapy, primary care, saw my neurology colleague NORTH Roblero 02/02/2022  - went back to work after visit NOR[facility] Roblero, was working from home until episode 3/17/22     On 03/17/2022 she was seated on a couch when the back of the couch collapsed causing her to fall and hit the right side of her head on an end table she thinks, does not remember hitting her head, but had a red conchis at the right cheek area  Acute symptoms included:  Does not remember a few seconds of time, unknown if LOC as unwitnessed, progressively worsening vertigo/room spinning and generalized headache with photophobia and phonophobia and nausea     - as of 3/29/2022: she continues to have chronic photophobia, migraines, but vertigo after the incident actually has now been better than prior      Mood:   - anxiety and depression in the past, worse later due to medical conditions   - post traumatic stress symptoms - hypervigilance, impact on cognition and mood, lack of emotional filter  Things at work are bad, HR said if no emotional filter you should not come back   Never worked with a counselor   No SI     Work  8-4  M-F  Out  Since May 2022 (was out prior to this but work was not able to accommodate her reduced schedule and requests upon her return, thus is not employed,  Looking at [employer] [facility] [employer] term disability as of 6/28/2022)     Headaches started at what age? 20 years old  How often do the headaches occur?   - as of 3/29/2022: chronic daily background headaches for years since at least 3/2020, worse when vertigo kicked in Dec 2021 and 1 day a week above 3/10     Aura? without aura      Last eye exam: eye sight improving, followed a long time ago, 7 years ago      Where is your headache located and pain quality?   - bifrontal pressure like an iron band across forehead is most common and radiates back across the top of the scalp until bioccipital   - ice pick left parietal   What is the intensity of pain? Average: 1/10, worst 7/10  Associated symptoms:   [x]? Nausea       []? Vomiting      [x]? Photophobia     [x]? Phonophobia      []? Osmophobia  []? Blurred vision   [x]? Light-headed or dizzy - chronic     []? Tinnitus - not for prolonged periods and not severe  []? Hands or feet tingle or feel numb/paresthesias    []? Ptosis      []?  Facial droop  []? Lacrimation  []? Nasal congestion/rhinorrhea          Things that make the headache worse? No specific movements, sometimes bending over     Headache triggers:  Lights, can be worse in am, stress, weather changes, unsure if hormonal      Have you seen someone else for headaches or pain? Yes many providers   Are you current pregnant or planning on getting pregnant? No plans, not active   Have you ever had any Brain imaging? yes     What medications do you take or have you taken for your headaches? ABORTIVE:       Ibuprofen dulls it  Meclizine     Past  Something as needed in the past   Steroids in the past   Migraine cocktails in ED have helped bring pain down      PREVENTIVE:   -     MVI      Past/ failed/contraindicated:   -        LIFESTYLE  Sleep   - averages: 6-8 hours, snores, never had a sleep study  Problems falling asleep?:   Yes  Problems staying asleep?:  Yes, 1-4 for unknown reasons        Water: 6 cups per day  Caffeine: usually 0-1 soda per day          The following portions of the patient's history were reviewed and updated as appropriate: allergies, current medications, past family history, past medical history, past social history, past surgical history and problem list      Pertinent family history:  Family history of headaches:  no known family members with significant headaches  Any family history of aneurysms - No     Pertinent social history:  Work: community , 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  Lives with cat       Past Medical History:     Past Medical History:   Diagnosis Date    Concussion     Edema of left lower extremity 7/12/2021    [facility])     [id number] stated by patient       Patient Active Problem List   Diagnosis    Obesity    Head injury    Headache    Vertigo    Worsening headaches    B12 deficiency    Vitamin D deficiency    Snoring    History of multiple concussions    Post-traumatic stress    Acute post-traumatic headache    Migraine    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    Neurologic complaint, functional       Medications:      Current Outpatient Medications   Medication Sig Dispense Refill    Multiple Vitamins-Minerals (ONE-A-DAY WOMENS PO) Take by mouth      rizatriptan (MAXALT) 10 MG tablet Take 1 tablet (10 mg total) by mouth once as needed for migraine May repeat in 2 hours if needed  Max 2/24 hours, 9/month  9 tablet 6    amitriptyline (ELAVIL) 10 mg tablet start 10mg at bedtime  Increase by 10mg each week until good effect on headaches/pain or reach 50mg daily (Patient not taking: Reported on 6/28/2022) 150 tablet 4    meclizine (ANTIVERT) 25 mg tablet Take 1 tablet (25 mg total) by mouth 3 (three) times a day as needed for dizziness for up to 20 doses (Patient not taking: No sig reported) 30 tablet 1     No current facility-administered medications for this visit          Allergies:    No Known Allergies    Family History:     Family History   Problem Relation Age of Onset   Jemima Lockwood Cancer Mother     Heart disease Father        Social History:       Social History     Socioeconomic History    Marital status: Single     Spouse name: Not on file    Number of children: Not on file    Years of education: Not on file    Highest education level: Not on file   Occupational History    Not on file   Tobacco Use    Smoking status: Never Smoker    Smokeless tobacco: Never Used   Vaping Use    Vaping Use: Never used   Substance and Sexual Activity    Alcohol use: Never    Drug use: Never    Sexual activity: Not on file   Other Topics Concern    Not on file   Social History Narrative    CONSUMES 1 SODA PER DAY     Social Determinants of Health     Financial Resource Strain: Not on file   Food Insecurity: Not on file   Transportation Needs: Not on file   Physical Activity: Not on file   Stress: Not on file   Social Connections: Not on file   Intimate Partner Violence: Not on file   Housing Stability: Not on file      I have reviewed the patient's medical, social and surgical history as well as medications in detail and updated the computerized patient record  Objective:     Vitals:    06/28/22 0801   BP: 121/58   BP Location: Left arm   Patient Position: Sitting   Cuff Size: Standard   Pulse: 68   Temp: (!) 97 3 °F (36 3 °C)   TempSrc: Tympanic   Weight: 124 kg (273 lb 6 4 oz)   Height: 5' 3" (1 6 m)       CONSTITUTIONAL: Well developed, well nourished, well groomed  No dysmorphic features  obese     Eyes:  EOM normal wearing sunglasses in the office     Neck:  Normal ROM, neck supple  HEENT:  Normocephalic atraumatic  Chest:  Respirations regular and unlabored  Psychiatric:  Normal behavior and appropriate affect      MENTAL STATUS  Orientation: Alert and oriented x 3  Fund of knowledge: Intact  MOTOR (Upper and lower extremities)   Bulk/tone/abnormal movement: Normal muscle bulk and tone  COORDINATION   Station/Gait: slow widebased gait with a cane  F/n intact but was very slow      Pertinent lab results:   5/6/2022 CBC and BMP unremarkable  11/02/2021 CMP and CBC unremarkable  TSH normal  Did not obtain my labs from 2/2/2022 as of yet, reminded to have done when has insurance     EKG 05/06/2022 sinus rhythm with sinus arrhythmia, normal EKG, rate 74,   EKG 5/15/2014,  normal sinus rhythm, rate 80,      Imaging: I have personally reviewed imaging and radiology read   - noncontrast head CT 03/04/2020: No acute intracranial abnormality   - CTA head and neck with without contrast 11/02/2021: Normal CT angiogram of the head and neck  - MRI brain IAC with without contrast 2/9/22: No IAC or CP angle pathology   Unremarkable MRI of the brain  - noncontrast head CT 03/17/2022: No acute intracranial abnormality   - CT/CTA head and neck 5/6/2022:Negative CTA head and neck for large vessel occlusion, dissection, aneurysm, or high-grade stenosis    New persistent asymmetrically prominent right superior orbital vein, which may represent orbital venous varix  Review of Systems:     Constitutional: Negative  Negative for appetite change and fever  HENT: Negative  Negative for hearing loss, tinnitus, trouble swallowing and voice change  Eyes: Positive for photophobia (standard daily)  Negative for pain  Respiratory: Negative  Negative for shortness of breath  Cardiovascular: Negative  Negative for palpitations  Gastrointestinal: Negative  Negative for nausea and vomiting  Endocrine: Negative  Negative for cold intolerance  Genitourinary: Negative  Negative for dysuria, frequency and urgency  Musculoskeletal: Negative  Negative for myalgias and neck pain  Skin: Negative  Negative for rash  Neurological: Positive for dizziness (seeing PT) and speech difficulty (word searching)  Negative for tremors, seizures, syncope, facial asymmetry, weakness, light-headedness, numbness and headaches  Hematological: Negative  Does not bruise/bleed easily  Psychiatric/Behavioral: Negative for confusion, hallucinations and sleep disturbance        I personally reviewed and updated the ROS that was entered by the medical assistant         I have spent 52 minutes in total time for this visit  Author:  Olga Galindo PA-C 6/28/2022 11:33 AM

## 2022-06-29 ENCOUNTER — TELEPHONE (OUTPATIENT)
Dept: FAMILY MEDICINE CLINIC | Facility: CLINIC | Age: 37
End: 2022-06-29

## 2022-06-29 NOTE — TELEPHONE ENCOUNTER
UNUM SHORT TERM DISABILITY FORMS RECEIVED VIA [employer] 6/29/22 AND GIVEN TO VERONICA TO PLACE IN PROPER FOLDER  THANK YOU

## 2022-07-06 ENCOUNTER — OFFICE VISIT (OUTPATIENT)
Dept: FAMILY MEDICINE CLINIC | Facility: CLINIC | Age: 37
End: 2022-07-06

## 2022-07-06 VITALS
SYSTOLIC BLOOD PRESSURE: 122 MMHG | HEART RATE: 80 BPM | HEIGHT: 63 IN | DIASTOLIC BLOOD PRESSURE: 70 MMHG | BODY MASS INDEX: 48.9 KG/M2 | WEIGHT: 276 LBS | RESPIRATION RATE: 16 BRPM

## 2022-07-06 DIAGNOSIS — F43.10 POST-TRAUMATIC STRESS: Primary | ICD-10-CM

## 2022-07-06 DIAGNOSIS — G44.319 ACUTE POST-TRAUMATIC HEADACHE, NOT INTRACTABLE: ICD-10-CM

## 2022-07-06 DIAGNOSIS — R29.818 DISABILITY DUE TO NEUROLOGICAL DISORDER: ICD-10-CM

## 2022-07-06 DIAGNOSIS — Z87.820 HISTORY OF MULTIPLE CONCUSSIONS: ICD-10-CM

## 2022-07-06 PROCEDURE — 99214 OFFICE O/P EST MOD 30 MIN: CPT | Performed by: FAMILY MEDICINE

## 2022-07-06 NOTE — ASSESSMENT & PLAN NOTE
Patient is continuing to have symptoms that are related to posttraumatic stress, as per the Neuro consult from March of 2022  At this point, they are having significant affect on her day-to-day  I would recommend follow with Neurology again at some point

## 2022-07-06 NOTE — ASSESSMENT & PLAN NOTE
Continues with her issues  Will continue to need therapy, as well as visits with Neurology in the future

## 2022-07-06 NOTE — PATIENT INSTRUCTIONS
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     At this point, the patient still cannot work at a reasonable rate  She can only work for very short time, with specific web sites, and from home  Because of this, it severely limits her from being able to work normal hours  History of multiple concussions     Continues with her issues  Will continue to need therapy, as well as visits with Neurology in the future  Post-traumatic stress - Primary     Patient is continuing to have symptoms that are related to posttraumatic stress, as per the Neuro consult from March of 2022  At this point, they are having significant affect on her day-to-day  I would recommend follow with Neurology again at some point  COVID 19 Instructions    Cortney Lovelace was advised to limit contact with others to essential tasks such as getting food, medications, and medical care  Proper handwashing reviewed, and Hand sanitzer when washing is not available  If the patient develops symptoms of COVID 19, the patient should call the office as soon as possible  For [id number] Flu season, it is strongly recommended that Flu Vaccinations be obtained  Virtual Visits:  Arsenio: This works on smart phones (any phone with Internet browsing capability)  You should get a text message when the provider is ready to see you  Click on the link in the text message, and the call should start  You will need to type in your name, and allow camera and microphone access  This is HIPPA compliant, and secure  If you have not already done so, get immunized to COVID 19        We are committed to getting you vaccinated as soon as possible and will be closely following CDC and [facility]  guidelines as they are released and revised  Please refer to our COVID-19 vaccine webpage for the most up to date information on the vaccine and our distribution efforts  This site will also have the most up to date recommendations for COVID booster vaccine  Chio rousseau    Call [phone] ([phone]), option 7    OUR NEW LOCATION:    [facility]  [address], [address] [place], [place], [address]  Fax: [phone]    Lab services and OB/GYN are at this location as well

## 2022-07-06 NOTE — ASSESSMENT & PLAN NOTE
Continues with getting headaches, as well as dizziness  Continue to follow with Neurology  Continue on Elavil 10

## 2022-07-06 NOTE — ASSESSMENT & PLAN NOTE
At this point, the patient still cannot work at a reasonable rate  She can only work for very short time, with specific web sites, and from home  Because of this, it severely limits her from being able to work normal hours

## 2022-07-06 NOTE — PROGRESS NOTES
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     At this point, the patient still cannot work at a reasonable rate  She can only work for very short time, with specific web sites, and from home  Because of this, it severely limits her from being able to work normal hours  History of multiple concussions     Continues with her issues  Will continue to need therapy, as well as visits with Neurology in the future  Post-traumatic stress - Primary     Patient is continuing to have symptoms that are related to posttraumatic stress, as per the Neuro consult from March of 2022  At this point, they are having significant affect on her day-to-day  I would recommend follow with Neurology again at some point  Diagnoses and all orders for this visit:    Post-traumatic stress    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    Acute post-traumatic headache, not intractable    History of multiple concussions            Subjective:      Patient ID: Cande Aguirre is a 44 y o  female  CC:    Chief Complaint   Patient presents with    Follow-up     Pt here for a follow up  Rcruz       HPI:    Patient is here to follow-up on her situation  Dizziness still present  Seems to be back to prior  Significant spinning and dizziness  Has gotten a bit better, but not normal     Still with photophobia  Has improved a bit, but still problems with outside or fluorescent lights  She is OK with using dark background on computers, but only for a couple hours  She feels that she is where she was prior to going back to work  She is not really able to do 8 hours straight            The following portions of the patient's history were reviewed and updated as appropriate: allergies, current medications, past family history, past medical history, past social history, past surgical history and problem list       Review of Systems   Constitutional: Positive for activity change  Negative for appetite change, fatigue and fever  HENT: Negative  Eyes: Positive for photophobia  Respiratory: Negative  Cardiovascular: Negative  Gastrointestinal: Negative  Musculoskeletal: Negative  Skin: Negative  Neurological: Positive for dizziness and headaches  Hematological: Negative  Psychiatric/Behavioral: Negative for sleep disturbance and suicidal ideas  Data to review:       Objective:    Vitals:    07/06/22 1600   BP: 122/70   BP Location: Left arm   Patient Position: Sitting   Cuff Size: Large   Pulse: 80   Resp: 16   Weight: 125 kg (276 lb)   Height: 5' 3" (1 6 m)        Physical Exam  Vitals and nursing note reviewed  Constitutional:       Appearance: Normal appearance  HENT:      Head: Normocephalic  Cardiovascular:      Rate and Rhythm: Normal rate and regular rhythm  Pulses: Normal pulses  Carotid pulses are 2+ on the right side and 2+ on the left side  Heart sounds: Normal heart sounds  No murmur heard  No friction rub  No gallop  Pulmonary:      Effort: Pulmonary effort is normal  No respiratory distress  Breath sounds: Normal breath sounds  No stridor  No wheezing, rhonchi or rales  Chest:      Chest wall: No tenderness  Neurological:      Mental Status: She is alert  Cranial Nerves: Cranial nerves are intact  Sensory: Sensation is intact  Motor: Motor function is intact  Comments: Patient did need to use dark glasses today secondary to photophobia  Also, using a quad cane for balance  Depression Screening and Follow-up Plan: Patient was screened for depression during today's encounter  They screened negative with a PHQ-2 score of 0

## 2022-07-07 ENCOUNTER — EVALUATION (OUTPATIENT)
Dept: PHYSICAL THERAPY | Facility: REHABILITATION | Age: 37
End: 2022-07-07

## 2022-07-07 DIAGNOSIS — G44.319 ACUTE POST-TRAUMATIC HEADACHE, NOT INTRACTABLE: ICD-10-CM

## 2022-07-07 DIAGNOSIS — S09.90XD INJURY OF HEAD, SUBSEQUENT ENCOUNTER: Primary | ICD-10-CM

## 2022-07-07 PROCEDURE — 97112 NEUROMUSCULAR REEDUCATION: CPT | Performed by: PHYSICAL THERAPIST

## 2022-07-07 NOTE — PROGRESS NOTES
Re-evaluation    Today's date: 2022  Patient name: Delmer Hernández  : 1985  MRN: [id number]  Referring provider: Farshad Michaud MD  Dx:   Encounter Diagnosis     ICD-10-CM    1  Injury of head, subsequent encounter  S09  90XD    2  Acute post-traumatic headache, not intractable  G44 319      tx by Jaren Randall SPT under direct supervision of Gaby Alford DPT  Subjective: Physical therapy has been helping, but symptoms are still fluctuating  Objective: See treatment diary below       Patient-Specific Functional Scale   Task is scored 0 (unable to perform activity) to 10 (ability to perform activity independently)  Activity     1  Bending over to do laundry/ manage cat litter/ clean cat vomit  3-4/10    2  Turning while ambulating  7/10    3  Walking on uneven surfaces  -9/10        DHI: 54  Gait speed  Self Selected:  1st trial: 0 48m/s, 2nd trial: 0 60m/s  Fast Speed:  0 67m/s    FGA:       Assessment: Delmer Hernández is a 44 y o  female who is coming to OP PT due to sxs of dizziness which is limiting her functional activity daily  On re-assessment pt demonstrates a mild improvement on the [facility] demonstrating mild improvements in function and perceived functional ability  Pt initiated the PSFS and demonstrates difficulty with bending over, turning while ambulating and when walking over uneven surfaces  Since the pt initiated therapy pt has demonstrated decreased reliance on [facility], has reported increased ability to ambulate further distances and is currently walking at a gait speed which defines her as a limited community ambulator  It is important to note when the pt is distracted and not focused on her impairments it is likely she can walk faster and with improved gait quality as observed in previous sessions  Per the FGA pt demonstrates significant improvement in balance   Pt is still limited with functional  Mobility in the home and community and is considered a fall risk and would benefit from skilled OP PT to improve level of independence and QOL  Plan: Functional mobility, neuromuscular re-education, 1x/week for 4 weeks from 7/7/22 to 9/1/22  Precautions: PTSD, hx of repeated concussions, high symptom irritability    HEP: VORx1 stand, saccades stand, static balance FTEC, walking in home    Manuals 6/13 6/20 6/23 6/27 7/7 6/1 6/2 6/6                                               Neuro Re-Ed                                  Linda Martinez, H/V H: 20s  V: 20s Performing at home    standing 2x45s plain standing 2x45s plain    Smooth pursuits           Saccades, H/V      standing 2x45s plain standing 2x45s plain    Ball toss in Vertical toss up, 10x, 2 sets stationary, than 4 laps in the // bars     Bounce toss stationary 20x   occ UE support     Horizontal ball toss stationary 10x, while ambulating in // bars 4 laps    [employer] toss 50' x 1    Ball bounce  50'    180 degrees turns    50' x 1      Ball toss over 40ft 10x with 10 turns to the R/L    Vertical toss up, 10x, 2 sets   Bounce pass down 10x 2 sets   Horizontal ball pass standing only 10x,     Horizontal ball toss with4 laps, 2 sets      Static balance       Foam FA EC UE prn 3x30s     Bend down and touch 19" object <> 17" object In // bars     4x with 19 <> 17" w 1UE support w amb & turn     4x w 19<> 17" w no UEs w amb and turn  nv      With 1UE for support on mat surface, 4x with 19", 3x with 17"    Incline negotiation  Forwards/backward x 3  Sidestepping each ways  solostep x 1 each Step on/off ramp x5 (stopped due to difficulty today) Step on/ off ramp  10x    Step on 2 steps, off 2 steps 10x       Overgound ambulation 200 ft no AD  200 ft no AD, occasional HTs to the L/R  200' cg    solostep 100' x 1    Solostep head turns/head nods 50' each 200ft in SOLO    "Fast" walking 16ft x4   Best: 12s 200ft in SOLO    40 ft x 4 with 4 turns to the R/L    Overground ambulation with no AD, 300ft    Foam Beam  Solostep  Forwards 2 laps  Sideways 2 laps         Step up/down     4" step with WBQC 10x, 10 no UEs  6" step with WBQC 10x       Foam HTs, H/V      FA EC 2x30s     Walking with HTs, HN   HTs 20ft x4 SOLO   // bars 2 laps ea // bars 2 laps ea With WBQC   50 ft HTs 50ft no neck mvt, 60ft HNs, 40ft no neck mvt     Walking to [employer]  2# from waist high surface and carry to place on low surface  10ft   x5  Carry 4# from 21" surface to another 21" surface over 20ft x 12       Ther Ex           6MWT           Scifit    10 min L3 - 3 5 10 min L3 10 min L3 5 SciFit L2 x10 min SciFit L2 x10 min SCIFIT 10 min L3   Overground walking       200ft quad cane                                     Modalities

## 2022-07-11 ENCOUNTER — APPOINTMENT (OUTPATIENT)
Dept: PHYSICAL THERAPY | Facility: REHABILITATION | Age: 37
End: 2022-07-11

## 2022-07-18 ENCOUNTER — APPOINTMENT (OUTPATIENT)
Dept: PHYSICAL THERAPY | Facility: REHABILITATION | Age: 37
End: 2022-07-18

## 2022-07-21 ENCOUNTER — OFFICE VISIT (OUTPATIENT)
Dept: PHYSICAL THERAPY | Facility: REHABILITATION | Age: 37
End: 2022-07-21

## 2022-07-21 DIAGNOSIS — F43.10 POST-TRAUMATIC STRESS: ICD-10-CM

## 2022-07-21 DIAGNOSIS — S09.90XD INJURY OF HEAD, SUBSEQUENT ENCOUNTER: Primary | ICD-10-CM

## 2022-07-21 DIAGNOSIS — G44.319 ACUTE POST-TRAUMATIC HEADACHE, NOT INTRACTABLE: ICD-10-CM

## 2022-07-21 PROCEDURE — 97112 NEUROMUSCULAR REEDUCATION: CPT | Performed by: PHYSICAL THERAPIST

## 2022-07-21 NOTE — PROGRESS NOTES
Daily Note    Today's date: 2022  Patient name: Leticia Alvarez  : 1985  MRN: [id number]  Referring provider: Sabine Ríos MD  Dx:   Encounter Diagnosis     ICD-10-CM    1  Injury of head, subsequent encounter  S09  90XD    2  Acute post-traumatic headache, not intractable  G44 319    3  Post-traumatic stress  F43 10                    Subjective: Reports she was improving, but had set back due to hitting her head on a tree branch  Reports more instability and sensitivity to light since then  Objective: See treatment diary below       Assessment: Tolerated treatment fair  Performed exercises in //bars today due to pt feeling increased instability  Highly challenged with rising from bending over with dysequilibrium  Reviewed and updated exercises for home including more generalized strengthening and fitness  Standing rest breaks needed through out  Cues given to reduce guarded UE posture when walking       Plan: Functional mobility, neuromuscular re-education, 1x/week for 4 weeks from 22 to 22  Precautions: PTSD, hx of repeated concussions, high symptom irritability    HEP: VORx1 stand, saccades stand, static balance FTEC, walking in home or outside with cane, mini squats, heel raises, hip abd    Manuals                                                  Neuro Re-Ed                                  VORx1, H/V H: 20s  V: 20s Performing at home         Smooth pursuits           Saccades, H/V           Ball toss in Vertical toss up, 10x, 2 sets stationary, than 4 laps in the // bars     Bounce toss stationary 20x   occ UE support     Horizontal ball toss stationary 10x, while ambulating in // bars 4 laps    [employer] toss 50' x 1    Ball bounce  50'    180 degrees turns    50' x 1      Ball toss over 40ft 10x with 10 turns to the R/L  Mojo Mobility fwd 4 laps in //bars    Lat 1 lap in //bars     Static balance            Bend down and touch 19" object <> 17" object In // bars     4x with 19 <> 17" w 1UE support w amb & turn     4x w 19<> 17" w no UEs w amb and turn  nv    In //bars x10 walking back and forth     Incline negotiation  Forwards/backward x 3  Sidestepping each ways  solostep x 1 each Step on/off ramp x5 (stopped due to difficulty today) Step on/ off ramp  10x    Step on 2 steps, off 2 steps 10x       Overgound ambulation 200 ft no AD  200 ft no AD, occasional HTs to the L/R  200' cg    solostep 100' x 1    Solostep head turns/head nods 50' each 200ft in SOLO    "Fast" walking 16ft x4  Best: 12s 200ft in SOLO    40 ft x 4 with 4 turns to the R/L  200ft with QC    200ft no AD, CG         Foam Beam  Solostep  Forwards 2 laps  Sideways 2 laps         Step up/down     4" step with WBQC 10x, 10 no UEs  6" step with WBQC 10x       Foam HTs, H/V           Walking with HTs, HN   HTs 20ft x4 SOLO   Standing 1'    In //bars HTs 2 laps     Walking to [employer]  2# from waist high surface and carry to place on low surface  10ft   x5  Carry 4# from 21" surface to another 21" surface over 20ft x 12       Ther Ex           6MWT           Scifit    10 min L3 - 3 5 10 min L3 10 min L3 5 9 min L4      Overground walking                                            Modalities

## 2022-07-25 ENCOUNTER — TELEPHONE (OUTPATIENT)
Dept: NEUROLOGY | Facility: CLINIC | Age: 37
End: 2022-07-25

## 2022-07-25 ENCOUNTER — APPOINTMENT (OUTPATIENT)
Dept: PHYSICAL THERAPY | Facility: REHABILITATION | Age: 37
End: 2022-07-25

## 2022-07-25 NOTE — TELEPHONE ENCOUNTER
Fax received from [facility] requesting medical records  Scanned into media and sent to [phone]

## 2022-07-28 ENCOUNTER — OFFICE VISIT (OUTPATIENT)
Dept: PHYSICAL THERAPY | Facility: REHABILITATION | Age: 37
End: 2022-07-28

## 2022-07-28 DIAGNOSIS — G44.319 ACUTE POST-TRAUMATIC HEADACHE, NOT INTRACTABLE: ICD-10-CM

## 2022-07-28 DIAGNOSIS — F43.10 POST-TRAUMATIC STRESS: ICD-10-CM

## 2022-07-28 DIAGNOSIS — S09.90XD INJURY OF HEAD, SUBSEQUENT ENCOUNTER: Primary | ICD-10-CM

## 2022-07-28 PROCEDURE — 97112 NEUROMUSCULAR REEDUCATION: CPT | Performed by: PHYSICAL THERAPIST

## 2022-07-28 NOTE — PROGRESS NOTES
Daily Note    Today's date: 2022  Patient name: Alcides Powers  : 1985  MRN: [id number]  Referring provider: Nickie Bob MD  Dx:   Encounter Diagnosis     ICD-10-CM    1  Injury of head, subsequent encounter  S09  90XD    2  Acute post-traumatic headache, not intractable  G44 319    3  Post-traumatic stress  F43 10                    Subjective: Reports she was hit in the head with a bean bag cushion chair while playing with her niece  Experienced increased headache and dizziness since  Objective: See treatment diary below       Assessment: Tolerated treatment fair  Some regressions made today due to increased dizziness, but tried to have pt work a similar intensity as last vist with standing rest breaks as needed  Provided further education and cues to work within mild to moderate symptom intensity  Continues with highly guarded posture and gait  Also discussed diaphragmatic breathing to ease anxiety/tension with exercises and provided HEP print out  Would benefit from continued PT  Plan: Functional mobility, neuromuscular re-education, 1x/week for 4 weeks from 22 to 22  Precautions: PTSD, hx of repeated concussions, high symptom irritability     HEP: VORx1 stand, saccades stand, static balance FTEC, walking in home or outside with cane, mini squats, heel raises, hip abd   Diaphragmatic breathing     Manuals                                                 Neuro Re-Ed                                  VORx1, H/V H: 20s  V: 20s Performing at home         Smooth pursuits           Saccades, H/V           Ball toss in Vertical toss up, 10x, 2 sets stationary, than 4 laps in the // bars     Bounce toss stationary 20x   occ UE support     Horizontal ball toss stationary 10x, while ambulating in // bars 4 laps    [employer] toss 50' x 1    Ball bounce  50'    180 degrees turns    50' x 1      Ball toss over 40ft 10x with 10 turns to the R/L   Snide toss fwd 4 laps in //bars    Lat 1 lap in //bars [place] toss standing 4' varying high/low L/R    Static balance            Bend down and touch 19" object <> 17" object In // bars     4x with 19 <> 17" w 1UE support w amb & turn     4x w 19<> 17" w no UEs w amb and turn  nv    In //bars x10 walking back and forth     Incline negotiation  Forwards/backward x 3  Sidestepping each ways  solostep x 1 each Step on/off ramp x5 (stopped due to difficulty today) Step on/ off ramp  10x    Step on 2 steps, off 2 steps 10x       Overgound ambulation 200 ft no AD  200 ft no AD, occasional HTs to the L/R  200' cg    solostep 100' x 1    Solostep head turns/head nods 50' each 200ft in SOLO    "Fast" walking 16ft x4  Best: 12s 200ft in SOLO    40 ft x 4 with 4 turns to the R/L  200ft with QC    200ft no AD, CG     300ft with QC    100ft no AD CG    Foam Beam  Solostep  Forwards 2 laps  Sideways 2 laps         Step up/down     4" step with WBQC 10x, 10 no UEs  6" step with WBQC 10x       Foam HTs, H/V           Walking with HTs, HN   HTs 20ft x4 SOLO   Standing 1'    In //bars HTs 2 laps     Walking to [employer]  2# from waist high surface and carry to place on low surface  10ft  x5  Carry 4# from 21" surface to another 21" surface over 20ft x 12   Carry cone from stool to high surface and stack  6 x2  In //bars    Step over cones       Low cones, in //bars  Holding cone+ball for distraction   2 laps     Ther Ex           6MWT           Scifit    10 min L3 - 3 5 10 min L3 10 min L3 5 9 min L4  L2 5 15min    Overground walking                                            Modalities

## 2022-08-01 ENCOUNTER — APPOINTMENT (OUTPATIENT)
Dept: PHYSICAL THERAPY | Facility: REHABILITATION | Age: 37
End: 2022-08-01

## 2022-08-02 ENCOUNTER — OFFICE VISIT (OUTPATIENT)
Dept: PHYSICAL THERAPY | Facility: REHABILITATION | Age: 37
End: 2022-08-02

## 2022-08-02 DIAGNOSIS — F43.10 POST-TRAUMATIC STRESS: ICD-10-CM

## 2022-08-02 DIAGNOSIS — S09.90XD INJURY OF HEAD, SUBSEQUENT ENCOUNTER: Primary | ICD-10-CM

## 2022-08-02 DIAGNOSIS — G44.319 ACUTE POST-TRAUMATIC HEADACHE, NOT INTRACTABLE: ICD-10-CM

## 2022-08-02 PROCEDURE — 97112 NEUROMUSCULAR REEDUCATION: CPT | Performed by: PHYSICAL THERAPIST

## 2022-08-02 NOTE — PROGRESS NOTES
Daily Note    Today's date: 2022  Patient name: Suma Espino  : 1985  MRN: [id number]  Referring provider: Willi Mills MD  Dx:   Encounter Diagnosis     ICD-10-CM    1  Injury of head, subsequent encounter  S09  90XD    2  Acute post-traumatic headache, not intractable  G44 319    3  Post-traumatic stress  F43 10                    Subjective: Reports feeling ok after therapy visits  Had a good day , but is having head pressure today and heightened sensitivity to light or sound  Objective: See treatment diary below       Assessment: Tolerated treatment well  Able to tolerate greater walking volume today  Brief bouts of neuromuscular re-ed done w/o sunglass to encourage desensitization to light and dependence  Continues with excessive balance reactions regardless of circustance, and guarded UE posture  Would benefit from continued PT  Plan: Functional mobility, neuromuscular re-education, 1x/week for 4 weeks from 22 to 22  Precautions: PTSD, hx of repeated concussions, high symptom irritability     HEP: VORx1 stand, saccades stand, static balance FTEC, walking in home or outside with cane, mini squats, heel raises, hip abd   Diaphragmatic breathing     Manuals                                                Neuro Re-Ed                                  VORx1, H/V H: 20s  V: 20s Performing at home         Smooth pursuits           Saccades, H/V           Ball toss in Vertical toss up, 10x, 2 sets stationary, than 4 laps in the // bars     Bounce toss stationary 20x   occ UE support     Horizontal ball toss stationary 10x, while ambulating in // bars 4 laps    [employer] toss 50' x 1    Ball bounce  50'    180 degrees turns    50' x 1      Ball toss over 40ft 10x with 10 turns to the R/L  [employer] toss fwd 4 laps in //bars    Lat 1 lap in //bars [employer] toss standing 4' varying high/low L/R [employer] toss walking //bars   Static balance Bend down and touch 19" object <> 17" object In // bars     4x with 19 <> 17" w 1UE support w amb & turn     4x w 19<> 17" w no UEs w amb and turn  nv    In //bars x10 walking back and forth     Incline negotiation  Forwards/backward x 3  Sidestepping each ways  solostep x 1 each Step on/off ramp x5 (stopped due to difficulty today) Step on/ off ramp  10x    Step on 2 steps, off 2 steps 10x       Overgound ambulation 200 ft no AD  200 ft no AD, occasional HTs to the L/R  200' cg    solostep 100' x 1    Solostep head turns/head nods 50' each 200ft in SOLO    "Fast" walking 16ft x4  Best: 12s 200ft in SOLO    40 ft x 4 with 4 turns to the R/L  200ft with QC    200ft no AD, CG     300ft with QC    100ft no AD CG 200ft with QC    100ft WC no sunglassess    300ft no AD    Foam Beam  Solostep  Forwards 2 laps  Sideways 2 laps         Step up/down     4" step with WBQC 10x, 10 no UEs  6" step with WBQC 10x    6" stpe with QC 10x   Foam HTs, H/V           Walking with HTs, HN   HTs 20ft x4 SOLO   Standing 1'    In //bars HTs 2 laps  2 laps in //bars HTs   Walking to [employer]  2# from waist high surface and carry to place on low surface  10ft  x5  Carry 4# from 21" surface to another 21" surface over 20ft x 12   Carry cone from stool to high surface and stack  6 x2  In //bars Carry cone from stool to high surface and stack  6 x2  In //bars (last lap no sunglasses)   Step over cones       Low cones, in //bars  Holding cone+ball for distraction  2 laps  Low cones, in //bars  Holding cone+ball for distraction   2 laps    Ther Ex           6MWT           Scifit    10 min L3 - 3 5 10 min L3 10 min L3 5 9 min L4  L2 5 15min L3 5 10 min   Overground walking                                            Modalities

## 2022-08-08 ENCOUNTER — OFFICE VISIT (OUTPATIENT)
Dept: PHYSICAL THERAPY | Facility: REHABILITATION | Age: 37
End: 2022-08-08

## 2022-08-08 DIAGNOSIS — S09.90XD INJURY OF HEAD, SUBSEQUENT ENCOUNTER: Primary | ICD-10-CM

## 2022-08-08 DIAGNOSIS — F43.10 POST-TRAUMATIC STRESS: ICD-10-CM

## 2022-08-08 DIAGNOSIS — G44.319 ACUTE POST-TRAUMATIC HEADACHE, NOT INTRACTABLE: ICD-10-CM

## 2022-08-08 PROCEDURE — 97112 NEUROMUSCULAR REEDUCATION: CPT | Performed by: PHYSICAL THERAPIST

## 2022-08-08 NOTE — PROGRESS NOTES
Daily Note    Today's date: 2022  Patient name: Jennifer Pastrana  : 1985  MRN: [id number]  Referring provider: Yasmin Canales MD  Dx:   Encounter Diagnosis     ICD-10-CM    1  Injury of head, subsequent encounter  S09  90XD    2  Acute post-traumatic headache, not intractable  G44 319    3  Post-traumatic stress  F43 10                    Subjective: Reports feeling ok after therapy visits  Had a good day , but is having head pressure today and heightened sensitivity to light or sound  Objective: See treatment diary below       Assessment: Tolerated treatment well  Able to tolerate high intensity of balance training today  Continued to provide education to encourage decreasing hyper vigulance of dizziness and fear avoidance of movement  Needs standing rest breaks at times due to dizziness  Challenged with backwards walking due to instabilty  Continues with excessive balance reactions  Would benefit from continued PT  Plan: Functional mobility, neuromuscular re-education, 1x/week for 4 weeks from 22 to 22  Precautions: PTSD, hx of repeated concussions, high symptom irritability     HEP: VORx1 stand, saccades stand, static balance FTEC, walking in home or outside with cane, mini squats, heel raises, hip abd   Diaphragmatic breathing     Manuals                                                Neuro Re-Ed                                                       Kicking soccer ball x10 SOLO          Ball toss  In standing 1min    Walking 60ft    Ball toss over 40ft 10x with 10 turns to the R/L  [employer] toss fwd 4 laps in //bars    Lat 1 lap in //bars [employer] toss standing 4' varying high/low L/R [employer] toss walking //bars   Static balance            Bend down and touch 19" object <> 17" object      In //bars x10 walking back and forth     Incline negotiation    Step on/ off ramp  10x    Step on 2 steps, off 2 steps 10x       Overgound ambulation 300ft no AD   200ft in SOLO    40 ft x 4 with 4 turns to the R/L  200ft with QC    200ft no AD, CG     300ft with QC    100ft no AD CG 200ft with QC    100ft WC no sunglassess    300ft no AD    Foam Beam           Step up/down  6" no AD  x10 SOLO   4" step with WBQC 10x, 10 no UEs  6" step with WBQC 10x    6" stpe with QC 10x   Backwards walk 4ft x8 SOLO          Walking with HTs, HN      Standing 1'    In //bars HTs 2 laps  2 laps in //bars HTs   Walking to [employer] cone from stool to high surface and stack  x8   Carry 4# from 21" surface to another 21" surface over 20ft x 12   Carry cone from stool to high surface and stack  6 x2  In //bars Carry cone from stool to high surface and stack  6 x2  In //bars (last lap no sunglasses)   Step over cones       Low cones, in //bars  Holding cone+ball for distraction  2 laps  Low cones, in //bars  Holding cone+ball for distraction  2 laps    Speed Walking SOLO  ~28ft with 1 curve   x8    Best: 13 8s          Ther Ex           6MWT           Scifit  L4 15min   10 min L3 10 min L3 5 9 min L4  L2 5 15min L3 5 10 min   Overground walking                                            Modalities

## 2022-08-11 ENCOUNTER — TELEPHONE (OUTPATIENT)
Dept: NEUROLOGY | Facility: CLINIC | Age: 37
End: 2022-08-11

## 2022-08-11 NOTE — TELEPHONE ENCOUNTER
Called and spoke to patient to confirm their upcoming appointment with Dr Tyrone Baker  Informed patient about arriving in the [place] location 15 minutes prior to their appointment to get checked in and going over chart

## 2022-08-14 NOTE — ADDENDUM NOTE
Addended by: Marlin Peña on: 2/8/2022 02:42 PM     Modules accepted: Orders Break Coverage RN: Received pt in , ambulatory, pt A&Ox4, respirations even and unlabored b/l. Pt c/o suicidal thoughts for approx 6 months, decided to be seen today because he wants a change in his life. Reports thoughts to self harm by cutting wrists or stomach. Pt compliant with risperidone, Effexor, Lexapro. Also endorsing slight neck pain after exercise, doing crunches. Denies hx of suicidal attempts. Denies HI. Denies A/V hallucinations. Pt calm and cooperative. Pt changed and belongings secured in . Awaiting psych eval. Appears in no apparent distress. Will continue to monitor.

## 2022-08-15 ENCOUNTER — OFFICE VISIT (OUTPATIENT)
Dept: PHYSICAL THERAPY | Facility: REHABILITATION | Age: 37
End: 2022-08-15

## 2022-08-15 DIAGNOSIS — S09.90XD INJURY OF HEAD, SUBSEQUENT ENCOUNTER: Primary | ICD-10-CM

## 2022-08-15 DIAGNOSIS — F43.10 POST-TRAUMATIC STRESS: ICD-10-CM

## 2022-08-15 DIAGNOSIS — G44.319 ACUTE POST-TRAUMATIC HEADACHE, NOT INTRACTABLE: ICD-10-CM

## 2022-08-15 PROCEDURE — 97112 NEUROMUSCULAR REEDUCATION: CPT | Performed by: PHYSICAL THERAPIST

## 2022-08-15 NOTE — TELEPHONE ENCOUNTER
Pt left a vm today at 11:34 and stated that she called [facility]  She was told that [facility] did not receive the medical record request from [facility]  Requesting be fax to [facility] [phone]    Urgent [facility] med record request faxed to [facility]

## 2022-08-15 NOTE — PROGRESS NOTES
Daily Note    Today's date: 8/15/2022  Patient name: Leticia Alvarez  : 1985  MRN: [id number]  Referring provider: Sabine Ríos MD  Dx:   Encounter Diagnosis     ICD-10-CM    1  Injury of head, subsequent encounter  S09  90XD    2  Acute post-traumatic headache, not intractable  G44 319    3  Post-traumatic stress  F43 10                    Subjective: Reports having a medium to bad day today    Objective: See treatment diary below       Assessment: Tolerated treatment fair  Encouraged pt to continue to push her self despite higher symptoms today  Overall volume of exercises decreased somewhat from previous visit due to more time needed for therapeutic rest breaks  High frequency of excessive balance responses, not changed with distraction  Would benefit from continued PT  Plan: Functional mobility, neuromuscular re-education, 1x/week for 4 weeks from 22 to 22  Precautions: PTSD, hx of repeated concussions, high symptom irritability     HEP: VORx1 stand, saccades stand, static balance FTEC, walking in home or outside with cane, mini squats, heel raises, hip abd   Diaphragmatic breathing     Manuals 8/8 8/15   7/7 7/21 7/28 8/2                                               Neuro Re-Ed                                                       Kicking soccer ball x10 SOLO          Ball toss  In standing 1min    Walking 60ft  Walking 20ft x4    Ball toss fwd 4 laps in //bars    Lat 1 lap in //bars [employer] toss standing 4' varying high/low L/R [employer] toss walking //bars   Static balance            Bend down and touch 19" object <> 17" object      In //bars x10 walking back and forth     Incline negotiation           Overgound ambulation 300ft no AD 100ft with QC    200ft no AD (last 80ft with stacking cones for distraction)    200ft with QC    200ft no AD, CG     300ft with QC    100ft no AD CG 200ft with QC    100ft WC no sunglassess    300ft no AD    Foam Beam           Step up/down  6" no AD  x10 SOLO 6" stpe with QC 10x   Backwards walk 4ft x8 SOLO 4ft x6 SOLO         Walking with HTs, HN      Standing 1'    In //bars HTs 2 laps  2 laps in //bars HTs   Walking to [employer] cone from stool to high surface and stack  x8 [employer] cone from stool to high surface and stack  x4     Carry cone from stool to high surface and stack  6 x2  In //bars Carry cone from stool to high surface and stack  6 x2  In //bars (last lap no sunglasses)   Step over cones       Low cones, in //bars  Holding cone+ball for distraction  2 laps  Low cones, in //bars  Holding cone+ball for distraction  2 laps    Speed Walking SOLO  ~28ft with 1 curve   x8    Best: 13 8s          Ther Ex           6MWT           Scifit  L4 15min L3 10 min   10 min L3 5 9 min L4  L2 5 15min L3 5 10 min   Overground walking                                            Modalities

## 2022-08-15 NOTE — TELEPHONE ENCOUNTER
Pt left a vm today at 10:25 asking if we received fax from [facility] requesting her medical records for short term disability  [phone]  Called pt and advised of the below  Can call [facility] at [phone]  Pt verbalized understanding

## 2022-08-19 ENCOUNTER — OFFICE VISIT (OUTPATIENT)
Dept: NEUROLOGY | Facility: CLINIC | Age: 37
End: 2022-08-19

## 2022-08-19 VITALS
HEART RATE: 84 BPM | WEIGHT: 274 LBS | SYSTOLIC BLOOD PRESSURE: 127 MMHG | HEIGHT: 63 IN | TEMPERATURE: 98.5 F | BODY MASS INDEX: 48.55 KG/M2 | DIASTOLIC BLOOD PRESSURE: 82 MMHG

## 2022-08-19 DIAGNOSIS — G43.009 MIGRAINE WITHOUT AURA AND WITHOUT STATUS MIGRAINOSUS, NOT INTRACTABLE: ICD-10-CM

## 2022-08-19 DIAGNOSIS — R51.9 CHRONIC DAILY HEADACHE: Primary | ICD-10-CM

## 2022-08-19 PROCEDURE — 99214 OFFICE O/P EST MOD 30 MIN: CPT | Performed by: PSYCHIATRY & NEUROLOGY

## 2022-08-19 RX ORDER — AMITRIPTYLINE HYDROCHLORIDE 50 MG/1
TABLET, FILM COATED ORAL
Qty: 90 TABLET | Refills: 3 | Status: SHIPPED | OUTPATIENT
Start: 2022-08-19

## 2022-08-19 NOTE — PROGRESS NOTES
[facility] Consult - Follow up   PATIENT:  Prosper Valderrama  MRN:  [id number]  :  1985  DATE OF SERVICE:  2022  REFERRED BY: No ref  provider found  PMD: Eddi Anne MD    Assessment/Plan:   Prosper Valderrama is a very pleasant  44 y o  female with a past medical history that includes migraine, BMI over 45, vertigo, B12 deficiency, vitamin-D deficiency, insomnia, anxiety, depression, mild degenerative disc disease C4-5 referred here for evaluation of headache  My initial evaluation 3/29/2022     Chronic daily headache  Migraine without aura and without status migrainosus, not intractable  Post-traumatic stress with some symptoms of functional neurologic disorder  History of possible concussion - resolved  We discussed her history of multiple possible concussions although we discussed it is also difficult to diagnose concussion definitively and how posttraumatic headache with migrainous features can often have similar presentation  Please see EMR and HPI for details  Most recently on 2022, she was seated on a couch when the back of the couch collapsed causing her to fall and hit the right side of her head on an end table she thinks, does not remember hitting her head, but had a red conchis at the right cheek area  She reports she does not remember a few seconds of time which we discussed can happen as a stress reaction are related to concussion and had progressively worsening up chronic vertigo and posttraumatic headache with migrainous features  She was evaluated emergency department where noncontrast head CT was unremarkable for acute pathology and she subsequently followed up with primary care provider who kept her out of work and asked her to follow up with Neurology    - As of 2022:  She reports following the injury vertigo actually got much better, she has chronic daily back on headaches for years dating back to at least 2020 that is worse about 1 day a week for which she is not interested in prescription preventative, did start trial of rizatriptan for abortive  She also has history of anxiety, depression and some symptoms of posttraumatic stress with functional neurologic disorder with hyper startle response, avoidance behavior and wearing sunglasses indoors, intrusion symptoms, negative impact on cognition and mood and arousal and reactivity changes  Also some symptoms of deconditioning and maladaptive coping  We discussed continue follow-up with PCP and recommended establishing care with a counselor for which I will have our  see if she can help her with a list   She was reassured by this information and we discussed gradual return to normalcy  Referral to sleep medicine for possible sleep apnea contributing to symptoms  - as of 5/6/2022: She reports she continues to have near daily headaches that are mild about 3/10 and worse about 2 times in the past month for which she did not yet try rizatriptan, however did go to ED today instead and sumatriptan plus IV fluids and Zofran helped  She is still working on following up with Sleep Medicine, eye doctor, counseling  She is now interested in prescription preventative and will start trial of amitriptyline with gradual titration up and again recommended taking rizatriptan next migraine   - as of 8/19/2022: She reports she is doing a lot better, continues to have daily headaches that are about 3/10, but reports improvement on amitriptyline 50 mg which she started end of June, about 6 weeks ago  None more than a 5/10 in 2 months, no longer having daily migraines, so hasnt needed rizatriptan  She is continuing to re-expose herself to TV, screens etc slowly       Workup:  - as of initial visit 03/29/2022 Neurologic assessment reveals normal neurological exam except for depressed mood and affect, tearful, sunglasses indoors, hyper startle response to light touch, allodynia bifrontal forehead  - noncontrast head CT 03/04/2020: No acute intracranial abnormality   - CTA head and neck with without contrast 11/02/2021: Normal CT angiogram of the head and neck  - MRI brain IAC with without contrast 2/9/22: No IAC or CP angle pathology  Unremarkable MRI of the brain  - noncontrast head CT 03/17/2022: No acute intracranial abnormality  - noncontrast head CT 05/06/2022: No acute intracranial abnormality  -CTA head and neck 05/06/2022: Negative CTA head and neck for large vessel occlusion, dissection, aneurysm, or high-grade stenosis  Preventative:  - we discussed headache hygiene and lifestyle factors that may improve headaches  -  amitriptyline 50 mg nightly  Discussed proper use, possible side effects and risks  - Past/ failed/contraindicated:  None  - future options:  topiramate, CGRP med    Abortive:  - discussed not taking over-the-counter or prescription pain medications more than 3 days per week to prevent medication overuse/rebound headache  -     rizatriptan (MAXALT) 10 MG tablet; Take 1 tablet (10 mg total) by mouth once as needed for migraine May repeat in 2 hours if needed  Max 2/24 hours, 9/month  Discussed proper use, possible side effects and risks  - Currently on through other providers:  Ibuprofen, meclizine  - Past/ failed/contraindicated:  OTC meds  - past:  Has tolerated steroids in the past, migraine cocktails have brought pain down, sumatriptan in ED helped with IV fluids and Zofran  - future options:  prochlorperazine, Toradol IM or p o , could consider trial for 5 days of Depakote or dexamethasone for prolonged migraine, ubrelvy, reyvow, nurtec    We have discussed concussions and the natural course of recovery  We have discussed that symptoms from a concussion typically take 1-2 weeks to resolve, and although sometimes it can feel like concussion symptoms linger on, at this point these symptoms would be more likely related to contributing factors      - Contributing factors may include:   Post traumatic stress, Prolonged removal from normal routine,  posttraumatic headache,  comorbid injuries, preexisting chronic headaches or migraines,  medication overuse headache, anxiety or depression, stress, deconditioning,  comorbid medical diagnoses  - I have recommended gradual return of normal cognitive and physical activity with safety precautions  - We discussed that newer research regarding concussion shows that the sooner one returns gradually to their normal physical and cognitive routine, the sooner one tends to recover  Prolonged removal from normal routine and deconditioning have been shown to prolong symptoms and worsen symptoms   - We discussed that sometimes there is a constellation of symptoms that some refer to as "post concussion syndrome," but I prefer not to use this term since that can be misleading and make people think they are still brain injured or "concussed," when the most common and likely etiology this far out from the head trauma is either contributing factors or a form of functional neurologic disorder with mixed symptoms  - We discussed how cognitive issues can have multiple causes and often related to multifactorial etiologies including stress, anxiety,  mood, pain, hypervigilance  and sleep issues and provided reassurance that, it is not likely the cognitive dysfunction is related to concussion at this point    - Safe driving precautions, should not drive at all if feeling sleepy or cognitively not well  * We discussed the most likely therapy to help in these cases would be counseling    Insomnia, snoring, concern for BUCKY  -     Ambulatory referral to Sleep Medicine; Future    Patient instructions      Good Rx to look up prescription costs     If when you obtain your Vitamin B12 it is on the low end of normal, I recommend repletion below 400   I recommend adding over-the-counter vitamin B12 1000 mcg daily sublingual (better absorption than pill)    Establish care with counseling     Referral to sleep medicine placed 3/29/22 - [phone]    Follow up with eye doctor for dilated eye exam     We dicussed gradual return to normalcy     - Neurosymptoms  org - good website for functional neurologic disorder     Headache/migraine treatment:   Abortive medications (for immediate treatment of a headache): It is ok to take ibuprofen, acetaminophen or naproxen (Advil, Tylenol,  Aleve, Excedrin) if they help your headaches you should limit these to No more than 3 times a week to avoid medication overuse/rebound headaches  For your more moderate to severe migraines take this medication early   Maxalt (rizatriptan) 10mg tabs - take one at the onset of headache  May repeat one time after 1-2 hours if pain has not resolved  (Max 2 a day and 9 a month)       Prescription preventive medications for headaches/migraines   (to take every day to help prevent headaches - not to take at the time of headache):  [x] amitriptyline 50 mg nightly    *Typically these types of medications take time untill you see the benefit, although some may see improvement in days, often it may take weeks, especially if the medication is being titrated up to a beneficial level  Please contact us if there are any concerns or questions regarding the medication  Lifestyle Recommendations:  [x] SLEEP - Maintain a regular sleep schedule: Adults need at least 7-8 hours of uninterrupted a night  Maintain good sleep hygiene:  Going to bed and waking up at consistent times, avoiding excessive daytime naps, avoiding caffeinated beverages in the evening, avoid excessive stimulation in the evening and generally using bed primarily for sleeping  One hour before bedtime would recommend turning lights down lower, decreasing your activity (may read quietly, listen to music at a low volume)   When you get into bed, should eliminate all technology (no texting, emailing, playing with your phone, iPad or tablet in bed)   [x] HYDRATION - Maintain good hydration  Drink  2L of fluid a day (4 typical small water bottles)  [x] DIET - Maintain good nutrition  In particular don't skip meals and try and eat healthy balanced meals regularly  [x] TRIGGERS - Look for other triggers and avoid them: Limit caffeine to 1-2 cups a day or less  Avoid dietary triggers that you have noticed bring on your headaches (this could include aged cheese, peanuts, MSG, aspartame and nitrates)  [x] EXERCISE - physical exercise as we all know is good for you in many ways, and not only is good for your heart, but also is beneficial for your mental health, cognitive health and  chronic pain/headaches  I would encourage at the least 5 days of physical exercise weekly for at least 30 minutes  Education and Follow-up  [x] Please call with any questions or concerns  Of course if any new concerning symptoms go to the emergency department  [x] Follow up as scheduled with [facility] Ponce on 1/10/23 and with Dr Uli Doyle in 3 months after, sooner if needed         CC: We had the pleasure of evaluating Fernanda Hook in neurological consultation today  Fernanda Hook is a   left  handed female who presents today for evaluation of headaches  History obtained from patient as well as available medical record review    History of Present Illness:   Interval history as of 8/19/2022  - denies any new or concerning neurologic symptoms since last visit   - Saw NOR[facility] Ponce 6/28/22   - Vertigo has improved and still goes to PT once a week, no longer takes meclizine as it was not helping  - Was referred to sleep medicine but she has no insurance currently and was unable to make an appointment with them due to cost  - Sleep is worse than previous, now sleeping 2 hours and then waking up, used to have more problems falling asleep but now the problem is staying asleep  - Continues to be out of work, no job   - Has not been able to follow up with eye doctor due to cost  - No ED visits since May  - Is working on exposure therapy every day but doesn't feel like she is making improvements currently and has been stuck trying to improve her time looking at screens, can only tolerate an hour of TV or 20 mins of computer    Headaches and migraines   - baseline headache is now a 3/10 from a 1/10 last time  - has not had a headache that is >5/10 in more than 2 months  Preventative: amitriptyline now up to 50 mg HS - just started it late June   Abortive: Maxalt (has not had to use it at all)  Denies bothersome side effects        Interval history as of 5/6/2022  - PCP is managing return to work, she is going back to work on Monday   -she had VNG which showed some sort of peripheral abnormality on the left contributing to her vertigo and is back with vestibular therapy  - she continues to struggle with deconditioning to lights and activities  -she has not yet followed up with counselor as recommended although  did offer list of counselors covered by her insurance, but has someone at PCP office that she is looking into   - She has not yet followed up with Sleep Medicine is recommended  - has not yet seen eye doctor for dilated eye exam, planning for next month     Headaches and migraines   She had she continues to have chronic near daily headaches that are typically mild at about 3/10, but only one significant migraine 6/10 yesterday where she took aleve and today was worse and went with ED  She went to the emergency department this morning due to migraine with diplopia, did not try rizatriptan and had improvement with sumatriptan a migraine cocktail with significant improvement in symptoms  She in fact saw Neurology while in the emergency department and CTA head and neck and CT head were performed  Has mostly not been taking any medications for headaches  Headache was worse yesterday and she had a naproxen in early afternoon  She has not been taking maxalt at home   Has been working on exposure therapy at home with light and screens and is only up to moderate tolerance  Preventative: nothing   Abortive: had not tried Rizatriptan yet      History as of our initial visit 03/29/2022: History of concussion  - 9th grade  States that she hit her head during indoor soccer and perhaps had a few seconds of LOC vs anmesia  - Most of 20s did not have medical insurance and had some hits to the head   - 2/29/2020 hit her head on her car  Didn't have LOC  Headaches, motion and light sensitivity started 20 minutes after  Next time lost balance   - 3/4/2020 when she was stepping out of her car, loss balance, did not hit head  Went to ER and then did therapy afterwards  Quit her job after this      - Patient started a job in August 2021 after being out of work for 1 5 years  She feels like this was over stimulating for her  She felt noise and light were overwhelming for her      - November of 2021 patient presented to her family doctor with a severe headache that was unrelenting for 1 and half weeks  Patient had stabbing on the left side of her head  dizziness and felt unsteady  She was sent to the emergency room for evaluation via ambulance  CTA of the head and neck was done in the emergency room which was negative   - Since the ER, states that she developed dizziness 1 week before José Manuel out of nowhere  working from home since 12/27/2021 due to her dizziness  - Does wear her sunglasses frequently during the day indoors  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 the max time without the glasses was 1 hour and then needed the entire rest of the day    States at home uses less, however she did place them on 2 times during our visit  - She has followed with Sports Medicine, physical therapy, primary care, saw my neurology colleague [facility] Arteaga 02/02/2022  - went back to work after visit [facility] Arteaga, was working from home until episode 3/17/22    On 03/17/2022 she was seated on a couch when the back of the couch collapsed causing her to fall and hit the right side of her head on an end table she thinks, does not remember hitting her head, but had a red conchis at the right cheek area  Acute symptoms included:  Does not remember a few seconds of time, unknown if LOC as unwitnessed, progressively worsening vertigo/room spinning and generalized headache with photophobia and phonophobia and nausea    - as of 3/29/2022: she continues to have chronic photophobia, migraines, but vertigo after the incident actually has now been better than prior     Mood:   - anxiety and depression in the past, worse later due to medical conditions   - post traumatic stress symptoms - hypervigilance, impact on cognition and mood, lack of emotional filter  Things at work are bad, HR said if no emotional filter you should not come back   Never worked with a counselor   No SI    Work  8-4  M-F  Out the past 2 weeks      Headaches started at what age? 20 years old  How often do the headaches occur?   - as of 3/29/2022: chronic daily background headaches for years since at least 3/2020, worse when vertigo kicked in Dec 2021 and 1 day a week above 3/10    Aura? without aura      Last eye exam: eye sight improving, followed a long time ago, 7 years ago     Where is your headache located and pain quality?   - bifrontal pressure like an iron band across forehead is most common and radiates back across the top of the scalp until bioccipital   - ice pick left parietal   What is the intensity of pain? Average: 1/10, worst 7/10  Associated symptoms:   [x] Nausea       [] Vomiting      [x] Photophobia     [x]Phonophobia      [] Osmophobia  [] Blurred vision   [x] Light-headed or dizzy - chronic     [] Tinnitus - not for prolonged periods and not severe  [] Hands or feet tingle or feel numb/paresthesias    [] Ptosis      [] Facial droop  [] Lacrimation  [] Nasal congestion/rhinorrhea        Things that make the headache worse?  No specific movements, sometimes bending over    Headache triggers:  Lights, can be worse in am, stress, weather changes, unsure if hormonal     Have you seen someone else for headaches or pain? Yes many providers   Are you current pregnant or planning on getting pregnant? No plans, not active   Have you ever had any Brain imaging? yes    What medications do you take or have you taken for your headaches?    ABORTIVE:      Ibuprofen dulls it  Meclizine    Past  Something as needed in the past   Steroids in the past   Migraine cocktails in ED have helped bring pain down     PREVENTIVE:   -    MVI     Past/ failed/contraindicated:  -      LIFESTYLE  Sleep   - averages: 6-8 hours, snores, never had a sleep study  Problems falling asleep?:   Yes  Problems staying asleep?:  Yes, 1-4 for unknown reasons      Water: 6 cups per day  Caffeine: usually 0-1 soda per day      The following portions of the patient's history were reviewed and updated as appropriate: allergies, current medications, past family history, past medical history, past social history, past surgical history and problem list     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    Neurologic complaint, functional       Medications: Current Outpatient Medications   Medication Sig Dispense Refill    amitriptyline (ELAVIL) 50 mg tablet 50mg nightly 90 tablet 3    Multiple Vitamins-Minerals (ONE-A-DAY WOMENS PO) Take by mouth      rizatriptan (MAXALT) 10 MG tablet Take 1 tablet (10 mg total) by mouth once as needed for migraine May repeat in 2 hours if needed  Max 2/24 hours, 9/month  9 tablet 6    meclizine (ANTIVERT) 25 mg tablet Take 1 tablet (25 mg total) by mouth 3 (three) times a day as needed for dizziness for up to 20 doses (Patient not taking: Reported on 8/19/2022) 30 tablet 1     No current facility-administered medications for this visit          Allergies:    No Known Allergies    Family History:     Family History   Problem Relation Age of Onset   Amos Odell Cancer Mother     Heart disease Father        Social History:     Social History     Socioeconomic History    Marital status: Single     Spouse name: Not on file    Number of children: Not on file    Years of education: Not on file    Highest education level: Not on file   Occupational History    Not on file   Tobacco Use    Smoking status: Never Smoker    Smokeless tobacco: Never Used   Vaping Use    Vaping Use: Never used   Substance and Sexual Activity    Alcohol use: Not Currently    Drug use: Never    Sexual activity: Not on file   Other Topics Concern    Not on file   Social History Narrative    CONSUMES 1 SODA PER DAY     Social Determinants of Health     Financial Resource Strain: Not on file   Food Insecurity: Not on file   Transportation Needs: Not on file   Physical Activity: Not on file   Stress: Not on file   Social Connections: Not on file   Intimate Partner Violence: Not on file   Housing Stability: Not on file         Objective:     Physical Exam:                                                                 Vitals:            Constitutional:    /82 (BP Location: Left arm, Patient Position: Sitting, Cuff Size: Large)   Pulse 84   Temp 98 5 °F (36 9 °C) (Tympanic)   Ht 5' 3" (1 6 m)   Wt 124 kg (274 lb)   BMI 48 54 kg/m²   BP Readings from Last 3 Encounters:   08/19/22 127/82   07/06/22 122/70   06/28/22 121/58     Pulse Readings from Last 3 Encounters:   08/19/22 84   07/06/22 80   06/28/22 68         Well developed, well nourished, well groomed  No dysmorphic features  Psychiatric:  Normal behavior and appropriate affect       Neurological Examination:     Mental status/cognitive function:   Recent and remote memory intact  Attention span and concentration as well as fund of knowledge are appropriate for age  Normal language and spontaneous speech  Cranial Nerves:  VII-facial expression symmetric  Motor Exam: symmetric bulk throughout  no atrophy, fasciculations or abnormal movements noted  Coordination:  no apparent dysmetria, ataxia or tremor noted  Gait: antalgic ambulates with a 4-prong cane        Pertinent lab results:   - routine labs ordered 02/08/2022 and not yet obtained  11/02/2021 CMP and CBC unremarkable  TSH normal     EKG 05/06/2022 sinus rhythm with sinus arrhythmia, normal EKG, rate 74,   EKG 5/15/2014,  normal sinus rhythm, rate 80,      Imaging: I have personally reviewed imaging and radiology read   - noncontrast head CT 03/04/2020: No acute intracranial abnormality   - CTA head and neck with without contrast 11/02/2021: Normal CT angiogram of the head and neck  - MRI brain IAC with without contrast 2/9/22: No IAC or CP angle pathology   Unremarkable MRI of the brain  - noncontrast head CT 03/17/2022: No acute intracranial abnormality  Review of Systems:   ROS obtained by medical assistant Personally reviewed and updated if indicated  I recommended PCP follow up for non neurologic problems  Review of Systems   Constitutional: Negative for appetite change and fever  HENT: Negative  Negative for hearing loss, tinnitus, trouble swallowing and voice change  Eyes: Positive for photophobia   Negative for pain  Respiratory: Negative  Negative for shortness of breath  Cardiovascular: Negative  Negative for palpitations  Gastrointestinal: Negative  Negative for nausea and vomiting  Endocrine: Negative  Negative for cold intolerance  Genitourinary: Negative  Negative for dysuria, frequency and urgency  Musculoskeletal: Negative  Negative for myalgias and neck pain  Skin: Negative  Negative for rash  Neurological: Positive for dizziness and headaches  Negative for tremors, seizures, syncope, facial asymmetry, speech difficulty, weakness, light-headedness and numbness  Hematological: Negative  Does not bruise/bleed easily  Psychiatric/Behavioral: Positive for sleep disturbance  Negative for confusion and hallucinations  All other systems reviewed and are negative        spent approximately 20 minutes with Patient  today in which greater than 50% of this time was spent in counseling/coordination of care  I also spent 14 minutes non face to face for this patient the same day         Author:  Alexandrea Vaz MD 8/19/2022 2:47 PM

## 2022-08-19 NOTE — PROGRESS NOTES
Review of Systems   Constitutional: Negative for appetite change and fever  HENT: Negative  Negative for hearing loss, tinnitus, trouble swallowing and voice change  Eyes: Positive for photophobia  Negative for pain  Respiratory: Negative  Negative for shortness of breath  Cardiovascular: Negative  Negative for palpitations  Gastrointestinal: Negative  Negative for nausea and vomiting  Endocrine: Negative  Negative for cold intolerance  Genitourinary: Negative  Negative for dysuria, frequency and urgency  Musculoskeletal: Negative  Negative for myalgias and neck pain  Skin: Negative  Negative for rash  Neurological: Positive for dizziness and headaches  Negative for tremors, seizures, syncope, facial asymmetry, speech difficulty, weakness, light-headedness and numbness  Hematological: Negative  Does not bruise/bleed easily  Psychiatric/Behavioral: Positive for sleep disturbance  Negative for confusion and hallucinations  All other systems reviewed and are negative

## 2022-08-22 ENCOUNTER — APPOINTMENT (OUTPATIENT)
Dept: PHYSICAL THERAPY | Facility: REHABILITATION | Age: 37
End: 2022-08-22

## 2022-08-22 NOTE — PROGRESS NOTES
Daily Note    Today's date: 2022  Patient name: Fernanda Hook  : 1985  MRN: [id number]  Referring provider: Cher Garza MD  Dx:   No diagnosis found  Subjective: Reports having a medium to bad day today    Objective: See treatment diary below       Assessment: Tolerated treatment fair  Encouraged pt to continue to push her self despite higher symptoms today  Overall volume of exercises decreased somewhat from previous visit due to more time needed for therapeutic rest breaks  High frequency of excessive balance responses, not changed with distraction  Would benefit from continued PT  Plan: Functional mobility, neuromuscular re-education, 1x/week for 4 weeks from 22 to 22  Precautions: PTSD, hx of repeated concussions, high symptom irritability     HEP: VORx1 stand, saccades stand, static balance FTEC, walking in home or outside with cane, mini squats, heel raises, hip abd   Diaphragmatic breathing     Manuals 8/8 8/15   7/7 7/21 7/28 8/2                                               Neuro Re-Ed                                                       Kicking soccer ball x10 SOLO          Ball toss  In standing 1min    Walking 60ft  Walking 20ft x4    Ball toss fwd 4 laps in //bars    Lat 1 lap in //bars Isha Freeze toss standing 4' varying high/low L/R Isha Freeze toss walking //bars   Static balance            Bend down and touch 19" object <> 17" object      In //bars x10 walking back and forth     Incline negotiation           Overgound ambulation 300ft no AD 100ft with QC    200ft no AD (last 80ft with stacking cones for distraction)    200ft with QC    200ft no AD, CG     300ft with QC    100ft no AD CG 200ft with QC    100ft WC no sunglassess    300ft no AD    Foam Beam           Step up/down  6" no AD  x10 SOLO       6" stpe with QC 10x   Backwards walk 4ft x8 SOLO 4ft x6 SOLO         Walking with HTs, HN      Standing 1'    In //bars HTs 2 laps  2 laps in //bars HTs   Walking to retreive object Carry cone from stool to high surface and stack  x8 Carry cone from stool to high surface and stack  x4     Carry cone from stool to high surface and stack  6 x2  In //bars Carry cone from stool to high surface and stack  6 x2  In //bars (last lap no sunglasses)   Step over cones       Low cones, in //bars  Holding cone+ball for distraction  2 laps  Low cones, in //bars  Holding cone+ball for distraction  2 laps    Speed Walking SOLO  ~28ft with 1 curve   x8    Best: 13 8s          Ther Ex           6MWT           Scifit  L4 15min L3 10 min   10 min L3 5 9 min L4  L2 5 15min L3 5 10 min   Overground walking                                            Modalities

## 2022-08-25 ENCOUNTER — OFFICE VISIT (OUTPATIENT)
Dept: PHYSICAL THERAPY | Facility: REHABILITATION | Age: 37
End: 2022-08-25

## 2022-08-25 DIAGNOSIS — G44.319 ACUTE POST-TRAUMATIC HEADACHE, NOT INTRACTABLE: ICD-10-CM

## 2022-08-25 DIAGNOSIS — F43.10 POST-TRAUMATIC STRESS: ICD-10-CM

## 2022-08-25 DIAGNOSIS — S09.90XD INJURY OF HEAD, SUBSEQUENT ENCOUNTER: Primary | ICD-10-CM

## 2022-08-25 PROCEDURE — 97112 NEUROMUSCULAR REEDUCATION: CPT | Performed by: PHYSICAL THERAPIST

## 2022-08-25 NOTE — PROGRESS NOTES
Re-Eval    Today's date: 2022  Patient name: Marci Laurent  : 1985  MRN: [id number]  Referring provider: Tricia Guerra MD  Dx:   Encounter Diagnosis     ICD-10-CM    1  Injury of head, subsequent encounter  S09  90XD    2  Acute post-traumatic headache, not intractable  G44 319    3  Post-traumatic stress  F43 10                    Subjective: Felt as though physical therapy has beneficial for her, but feels that she has had a set back in the past two weeks which she cannot attribute to anything in specific  With further discussion states that poor sleep, and significant stress/anxiety negatively affects her symptoms  States at home she will take walks at home and do some leg or eye exercises, but in the past 2 weeks hasn't done much stating dizziness increase is a limiting factor  Objective: See treatment diary below       Patient-Specific Functional Scale   Task is scored 0 (unable to perform activity) to 10 (ability to perform activity independently)  Activity       1  Bending over to do laundry/ manage cat litter/ clean cat vomit  3-4/10  4/10   2  Turning while ambulating  7/10  7/10   3  Walking on uneven surfaces  8-9/10  8 5/10         DHI: 54  On : 40    Gait speed  Self Selected:  1st trial: 0 48m/s, 2nd trial: 0 60m/s  Fast Speed:  0 67m/s     FGA:   On :       Updated Goals:  Pt will demonstrate at least 3 point improvement on PSFS  Pt will be able to achieve gait speed of at least 0 9m/s    Pt will demonstrate a least 4 point improvement on FGA  Pt will demonstrate competency with HEP and plan for managing and further progressing tolerance to daily activities         Assessment: Re-eval performed today  Pt demonstrated statistically significant improvement in [address] today indicating improved perception of function related to dizziness, however demonstrates fairly similar findings on other tests   To an extent, it is expected that it will take extended time for her to demonstrates improvements considering the chronic and fluctuating nature of her diagnosis  Recent social stressor may have also negatively affected performance  Charlene Simpson would benefit from more consistency with her HEP and working more on developing symptom management strategies while also improving self perception of health  Was further educated today on HEP, keeping a journal of daily accomplishments and exercise volume, and performing exercises/ADLs despite perception of a being a "bad" day  She would benefit from continued skilled PT to help further integrate these strategies, improve balance, and effectively reintegrate to higher activity level  Plan: Functional mobility, neuromuscular re-education, 1x/week for 4 weeks - 6 weeks  Precautions: PTSD, hx of repeated concussions, high symptom irritability      HEP: VORx1 stand, saccades stand, static balance FTEC, walking in home or outside with cane, mini squats, heel raises, hip abd  Diaphragmatic breathing, journaling        Manuals 8/8 8/15 8/25  7/7 7/21 7/28 8/2                                               Neuro Re-Ed                                 Education    20'                    Kicking soccer ball x10 Candie Richa toss  In standing 1min    Walking 60ft  Walking 20ft x4    Ball toss fwd 4 laps in //bars    Lat 1 lap in //bars Sabine Edwar toss standing 4' varying high/low L/R Sabine Edwar toss walking //bars   Static balance            Bend down and touch 19" object <> 17" object      In //bars x10 walking back and forth     Incline negotiation           Overgound ambulation 300ft no AD 100ft with QC    200ft no AD (last 80ft with stacking cones for distraction)    200ft with QC    200ft no AD, CG     300ft with QC    100ft no AD CG 200ft with QC    100ft WC no sunglassess    300ft no AD    Foam Beam           Step up/down  6" no AD  x10 SOLO       6" stpe with QC 10x   Backwards walk 4ft x8 SOLO 4ft x6 SOLO         Walking with HTs, HN Standing 1'    In //bars HTs 2 laps  2 laps in //bars HTs   Walking to [employer] cone from stool to high surface and stack  x8 Carry cone from stool to high surface and stack  x4     Carry cone from stool to high surface and stack  6 x2  In //bars Carry cone from stool to high surface and stack  6 x2  In //bars (last lap no sunglasses)   Step over cones       Low cones, in //bars  Holding cone+ball for distraction  2 laps  Low cones, in //bars  Holding cone+ball for distraction  2 laps    Speed Walking SOLO  ~28ft with 1 curve   x8    Best: 13 8s          Ther Ex           6MWT           Scifit  L4 15min L3 10 min   10 min L3 5 9 min L4  L2 5 15min L3 5 10 min   Overground walking                                            Modalities

## 2022-08-30 ENCOUNTER — OFFICE VISIT (OUTPATIENT)
Dept: PHYSICAL THERAPY | Facility: REHABILITATION | Age: 37
End: 2022-08-30

## 2022-08-30 DIAGNOSIS — S09.90XD INJURY OF HEAD, SUBSEQUENT ENCOUNTER: Primary | ICD-10-CM

## 2022-08-30 DIAGNOSIS — F43.10 POST-TRAUMATIC STRESS: ICD-10-CM

## 2022-08-30 DIAGNOSIS — G44.319 ACUTE POST-TRAUMATIC HEADACHE, NOT INTRACTABLE: ICD-10-CM

## 2022-08-30 PROCEDURE — 97112 NEUROMUSCULAR REEDUCATION: CPT | Performed by: PHYSICAL THERAPIST

## 2022-08-30 NOTE — PROGRESS NOTES
Daily Note    Today's date: 2022  Patient name: Alcides Powers  : 1985  MRN: [id number]  Referring provider: Nickie Bob MD  Dx:   Encounter Diagnosis     ICD-10-CM    1  Injury of head, subsequent encounter  S09  90XD    2  Acute post-traumatic headache, not intractable  G44 319    3  Post-traumatic stress  F43 10                    Subjective: Reports feeling well today  Has been doing a daily journal of accomplishments, exercises, and other reminders, which has been benefitical for staying on task with her activity levels and also remembering things  Plans to try sitting outside later today w/o sunglasses     Objective: See treatment diary below       Patient-Specific Functional Scale   Task is scored 0 (unable to perform activity) to 10 (ability to perform activity independently)  Activity       1  Bending over to do laundry/ manage cat litter/ clean cat vomit  3-4/10  4/10   2  Turning while ambulating  7/10  7/10   3  Walking on uneven surfaces  -9/10  8 5/10            Assessment: Tolerated treatment well  Pt demonstrating more stability with gait and less dizziness with movement  Able to tolerated part of session w/o sunglasses  Focused on desensitization to functional tasks particularly bending over  Rest breaks needed at times due to dizziness  Would benefit from continued PT  Plan: Functional mobility, neuromuscular re-education, 1x/week for 4 weeks - 6 weeks  Precautions: PTSD, hx of repeated concussions, high symptom irritability      HEP: VORx1 stand, saccades stand, static balance FTEC, walking in home or outside with cane, mini squats, heel raises, hip abd  Diaphragmatic breathing, journaling  Functional Impairment to Address   Activity      2  Bending over to do laundry/ manage cat litter/ clean cat vomit  -Bend to  bean bag from stool, turn 90deg and toss  5x 2 sets    - cone from various heights, walk across mat and place on chair (20ft)  6x 2 sets      2  Turning while ambulating   -Walking to targets and 180 deg turn x10      3  Walking on uneven surfaces  -Walking across mat, with and w/o engaging in conversion  8 laps      4  Improve overall activity tolerance / strength -200ft overground walking no AD    Mini squats 2x10    Marches 2 5# ankles, 2x10    Hip abd 2 5# x10 ea          5  Education            Manuals 8/8 8/15 8/25                                                    Neuro Re-Ed                                 Education    20'                    Kicking soccer ball x10 Lebron Bough toss  In standing 1min    Walking 60ft  Walking 20ft x4         Static balance            Bend down and touch 19" object <> 17" object           Incline negotiation           Overgound ambulation 300ft no AD 100ft with QC    200ft no AD (last 80ft with stacking cones for distraction)             Foam Beam           Step up/down  6" no AD  x10 SOLO          Backwards walk 4ft x8 SOLO 4ft x6 SOLO         Walking with HTs, HN           Walking to [employer] cone from stool to high surface and stack  x8 Carry cone from stool to high surface and stack  x4         Step over cones           Speed Walking SOLO  ~28ft with 1 curve   x8    Best: 13 8s          Ther Ex           6MWT           Scifit  L4 15min L3 10 min         Overground walking           LE strengthening                                      Modalities

## 2022-09-08 ENCOUNTER — APPOINTMENT (OUTPATIENT)
Dept: PHYSICAL THERAPY | Facility: REHABILITATION | Age: 37
End: 2022-09-08

## 2022-09-15 ENCOUNTER — OFFICE VISIT (OUTPATIENT)
Dept: PHYSICAL THERAPY | Facility: REHABILITATION | Age: 37
End: 2022-09-15

## 2022-09-15 DIAGNOSIS — G44.319 ACUTE POST-TRAUMATIC HEADACHE, NOT INTRACTABLE: ICD-10-CM

## 2022-09-15 DIAGNOSIS — S09.90XD INJURY OF HEAD, SUBSEQUENT ENCOUNTER: Primary | ICD-10-CM

## 2022-09-15 DIAGNOSIS — F43.10 POST-TRAUMATIC STRESS: ICD-10-CM

## 2022-09-15 PROCEDURE — 97112 NEUROMUSCULAR REEDUCATION: CPT | Performed by: PHYSICAL THERAPIST

## 2022-09-15 NOTE — PROGRESS NOTES
Daily Note    Today's date: 9/15/2022  Patient name: Donald Abbasi  : 1985  MRN: [id number]  Referring provider: Edwin Olguin MD  Dx:   Encounter Diagnosis     ICD-10-CM    1  Injury of head, subsequent encounter  S09  90XD    2  Acute post-traumatic headache, not intractable  G44 319    3  Post-traumatic stress  F43 10                    Subjective: A little bit of a rough week due to stressors  Neighbors fire alarm was going off all week     Objective: See treatment diary below       Patient-Specific Functional Scale   Task is scored 0 (unable to perform activity) to 10 (ability to perform activity independently)  Activity       1  Bending over to do laundry/ manage cat litter/ clean cat vomit  3-4/10  4/10   2  Turning while ambulating  7/10  7/10   3  Walking on uneven surfaces  -9/10  8 5/10            Assessment: Tolerated treatment well  Excessive stepping response continues to be noted especially with returning to upright after bending forward  Given visual reference point to focus on while coming up from bending forward to help with stability  Reviewed HEP and encouraged eliminating sunglasses use all together in the home to progress to less use outside  Would benefit from continued PT  Plan: Functional mobility, neuromuscular re-education, 1x/week for 4 weeks - 6 weeks  Precautions: PTSD, hx of repeated concussions, high symptom irritability      HEP: VORx1 stand, saccades stand, static balance FTEC, walking in home or outside with cane, mini squats, heel raises, hip abd  Diaphragmatic breathing, journaling  Functional Impairment to Address   Activity 9/15     2  Bending over to do laundry/ manage cat litter/ clean cat vomit  -Bend to  bean bag from stool, turn 90deg and toss  5x 2 sets    - cone from various heights, walk across mat and place on chair (20ft)  6x 2 sets -Bend over tap cones at various heights x12    -Morene Husk toss catching high and low   10x 2   Turning while ambulating   -Walking to targets and 180 deg turn x10 - 3# weights, turn and place on counter behind  6'    -Walking 360 deg turns on targets 30ft x3     3  Walking on uneven surfaces  -Walking across mat, with and w/o engaging in conversion  8 laps      4  Improve overall activity tolerance / strength -200ft overground walking no AD    Mini squats 2x10    Marches 2 5# ankles, 2x10    Hip abd 2 5# x10 ea     -200ft overground walking with cane  100ft no cane      5  Education            Manuals 8/8 8/15 8/25 9/15                                                   Neuro Re-Ed                                 Education    20'                    Kicking soccer ball x10 SOLO          Ball toss  In standing 1min    Walking 60ft  Walking 20ft x4         Static balance            Bend down and touch 19" object <> 17" object           Incline negotiation           Overgound ambulation 300ft no AD 100ft with QC    200ft no AD (last 80ft with stacking cones for distraction)             Foam Beam           Step up/down  6" no AD  x10 SOLO          Backwards walk 4ft x8 SOLO 4ft x6 SOLO         Walking with HTs, HN           Walking to [employer] cone from stool to high surface and stack  x8 Carry cone from stool to high surface and stack  x4         Step over cones           Speed Walking SOLO  ~28ft with 1 curve   x8    Best: 13 8s          Ther Ex           6MWT           Scifit  L4 15min L3 10 min         Overground walking           LE strengthening                                      Modalities

## 2022-09-21 ENCOUNTER — OFFICE VISIT (OUTPATIENT)
Dept: PHYSICAL THERAPY | Facility: REHABILITATION | Age: 37
End: 2022-09-21

## 2022-09-21 DIAGNOSIS — G44.319 ACUTE POST-TRAUMATIC HEADACHE, NOT INTRACTABLE: ICD-10-CM

## 2022-09-21 DIAGNOSIS — S09.90XD INJURY OF HEAD, SUBSEQUENT ENCOUNTER: Primary | ICD-10-CM

## 2022-09-21 PROCEDURE — 97112 NEUROMUSCULAR REEDUCATION: CPT | Performed by: PHYSICAL THERAPIST

## 2022-09-21 NOTE — PROGRESS NOTES
Daily Note    Today's date: 2022  Patient name: Chelsy Mendez  : 1985  MRN: [id number]  Referring provider: Phuong Perdomo MD  Dx:   Encounter Diagnosis     ICD-10-CM    1  Injury of head, subsequent encounter  S09  90XD    2  Acute post-traumatic headache, not intractable  G44 319         Subjective:   Was outside working on [employer] with the kids on   Had slight headache towards the end of that day  But Monday had stronger migraine, has abated minimally  Hasn't done screens for the past 3 days  Trying to keep steady with exertion  Usually can make it to the end of her block and back, trying to extend this a bit  Continues with heel raises and squats and head turns  Generally does fine with these  Sleep is very off and on  Started having trouble with sleeping since put on new migraine-preventative medication  Sleeping 3-4 hours at a time  Trouble falling asleep and staying asleep  Difficulty quieting mind at night  Tries counting, tries visualization, or will get up and trying to do a calming activity  Used to be a deep sleeper, not now  Reports baseline of symptoms with recognizable triggers like sun, sound  Objective: See treatment diary below     Patient-Specific Functional Scale   Task is scored 0 (unable to perform activity) to 10 (ability to perform activity independently)  Activity       1  Bending over to do laundry/ manage cat litter/ clean cat vomit  3-4/10  4/10   2  Turning while ambulating  7/10  7/10   3  Walking on uneven surfaces  -9/10  8 5/10        Assessment:   Focused on grading of exercise to mild-moderate level, habituation is the idea here along with better resiliency to prior provoking stimuli  Symptoms are okay and are not harm  Had prior discussion with neurologist about how "a bear is no longer present "    Plan: Functional mobility, neuromuscular re-education, 1x/week for 4 weeks - 6 weeks       Precautions: PTSD, hx of repeated concussions, high symptom irritability      HEP: VORx1 stand, saccades stand, static balance FTEC, walking in home or outside with cane, mini squats, heel raises, hip abd  Diaphragmatic breathing, journaling  Functional Impairment to Address  8/30 Activity 9/15 9/21/22  /94 manually    2  Bending over to do laundry/ manage cat litter/ clean cat vomit  -Bend to  bean bag from stool, turn 90deg and toss  5x 2 sets    - cone from various heights, walk across mat and place on chair (20ft)  6x 2 sets -Bend over tap cones at various heights x12    -[place] Low toss catching high and low  10x Retrieve objects from 8" surface, return to stand, 5 x 2 sets  From 2" surface,   5 x 2 sets      2  Turning while ambulating   -Walking to targets and 180 deg turn x10 - 3# weights, turn and place on counter behind  6'    -Walking 360 deg turns on targets 30ft x3 Walk and turn 360 degrees    3  Walking on uneven surfaces  -Walking across mat, with and w/o engaging in conversion  8 laps  Walking across mat with bean bags underneath, within parallel bars, 5/10 unsteadiness, x 3 trips, 6-7/10 unsteadiness  112 bpm        4  Improve overall activity tolerance / strength -200ft overground walking no AD    Mini squats 2x10    Marches 2 5# ankles, 2x10    Hip abd 2 5# x10 ea     -200ft overground walking with cane  100ft no cane  112 bpm to start  100 feet no cane  100 feet no cane with fidget spinner     5     Education            Manuals 8/8 8/15 8/25 9/15                                                   Neuro Re-Ed                                 Education    20'                    Kicking soccer ball x10 Alma Delia Munguia toss  In standing 1min    Walking 60ft  Walking 20ft x4         Static balance            Bend down and touch 19" object <> 17" object           Incline negotiation           Overgound ambulation 300ft no AD 100ft with QC    200ft no AD (last 80ft with stacking cones for distraction)             Foam Beam           Step up/down  6" no AD  x10 SOLO          Backwards walk 4ft x8 SOLO 4ft x6 SOLO         Walking with HTs, HN           Walking to [employer] cone from stool to high surface and stack  x8 Carry cone from stool to high surface and stack  x4         Step over cones           Speed Walking SOLO  ~28ft with 1 curve   x8    Best: 13 8s          Ther Ex           6MWT           Scifit  L4 15min L3 10 min         Overground walking           LE strengthening                                      Modalities

## 2022-09-28 ENCOUNTER — APPOINTMENT (OUTPATIENT)
Dept: PHYSICAL THERAPY | Facility: REHABILITATION | Age: 37
End: 2022-09-28

## 2022-09-29 ENCOUNTER — OFFICE VISIT (OUTPATIENT)
Dept: PHYSICAL THERAPY | Facility: REHABILITATION | Age: 37
End: 2022-09-29

## 2022-09-29 DIAGNOSIS — F43.10 POST-TRAUMATIC STRESS: ICD-10-CM

## 2022-09-29 DIAGNOSIS — G44.319 ACUTE POST-TRAUMATIC HEADACHE, NOT INTRACTABLE: ICD-10-CM

## 2022-09-29 DIAGNOSIS — S09.90XD INJURY OF HEAD, SUBSEQUENT ENCOUNTER: Primary | ICD-10-CM

## 2022-09-29 PROCEDURE — 97112 NEUROMUSCULAR REEDUCATION: CPT | Performed by: PHYSICAL THERAPIST

## 2022-09-29 NOTE — PROGRESS NOTES
Treatment Note    Today's date: 2022  Patient name: Leta Carbajal  : 1985  MRN: [id number]  Referring provider: Minerva Amaya MD  Dx:   Encounter Diagnosis     ICD-10-CM    1  Injury of head, subsequent encounter  S09  90XD    2  Acute post-traumatic headache, not intractable  G44 319    3  Post-traumatic stress  F43 10         Subjective:   Didn't sleep well, feels less balanced today  If unable to sleep, gets up and does something, feels tired and heads back to sleep, then head lights up again  Head starts spinning and can't quiet down again  Avoiding afternoon naps as best as able  Laying down for an hour prior to coming in today, not sleeping  And slept about 2 hours Saturday, otherwise no afternoon naps  Wakes to alarm at 8:30am, toiletries, feed and water cat, opens drapes, gets to balcony for air and light, the rest depends on headache  On a good day, would go out and walk to end of the block  Did this 4 days in the past week  Always has cane with her, tries to do more picking cane up, trying not to use when able  Able to walk without cane with straight paths, uses for turning  Generally not using in the home, unless something involving bending over  Pretty much the same with bending to get laundry, cat litter, gets symptoms, sometimes subsides in 5-10 minutes, sometimes an hour or more  Continues with eye and head movement exercises, stationary squats, going up on toes  Hasn't done that well with screens, susceptible to headaches  Headaches coming faster  Averaging 1 5 - 2 hours per day, sometimes straight, sometimes in 15-20 minute increments  Looked at neurosymptoms  org  Identified with    Brain fog  Not constant  Sometimes with reading, discovers she's been re-reading the same page    Notes the one video game she can still play, finds it difficult to play, can't do first person view but can do third person view, even though she's very familiar with the terrain, has difficulty navigating, notes it can be a per-day thing, it can vary match to match (8-10 minutes)  Brain works better with more sleep (struggling with this at the moment), getting the walking and exercises in, not pushing to the point of overexertion  Presyncope  Most with standing up and sitting down  Varies  Notes low blood pressure as a child describing black spots upon rising quickly  Looking into a floor model stepper  Would look to do more laps around stairs and laps around apartment in worse weather  Hydrates well, uses insulated mug for cold water, refills throughout the day  Does this 5-6 times per day  Vertigo  See below for factors  Provoked by bending, turning quickly  With oculomotor exercises, can get headaches in a band in the frontal region, sometimes in stabbing sensation about left side of head  Easiest exercises are VOR x 1  Most challenging are oculomotor exercises and bending  Symptoms are usually mild, moderate if a bad day  Usually not severe  Reports getting 6 hours of sleep a night before taking amitriptyline, but doesn't get the bad migraines (but also stopped working so doesn't use screens 8 hours per day)  Versus June 2022, less migraines, feels more steady on feet  From 9/21/22:  Was outside working on birdhouses with the kids on Sunday  Had slight headache towards the end of that day  But Monday had stronger migraine, has abated minimally  Hasn't done screens for the past 3 days  Trying to keep steady with exertion  Usually can make it to the end of her block and back, trying to extend this a bit  Continues with heel raises and squats and head turns  Generally does fine with these  Sleep is very off and on  Started having trouble with sleeping since put on new migraine-preventative medication  Sleeping 3-4 hours at a time  Trouble falling asleep and staying asleep  Difficulty quieting mind at night    Tries counting, tries visualization, or will get up and trying to do a calming activity  Used to be a deep sleeper, not now  Reports baseline of symptoms with recognizable triggers like sun, sound  Objective: See treatment diary below     Patient-Specific Functional Scale   Task is scored 0 (unable to perform activity) to 10 (ability to perform activity independently)  Activity    8/25   1  Bending over to do laundry/ manage cat litter/ clean cat vomit  3-4/10  4/10   2  Turning while ambulating  7/10  7/10   3  Walking on uneven surfaces  8-9/10  8 5/10        Assessment:   Focused on grading of exercise to mild-moderate level, habituation is the idea here along with better resiliency  We can work to higher levels of exertion with seated aerobic exercise  Using a seated stepper is a good idea  Try sit to stand and step-ups also  Reviewed manifestations of functional neurologic disorder, good understanding and anticipate further improvement to prior level of mobility and function  Plan: Functional mobility, neuromuscular re-education, 1x/week for 4 weeks - 6 weeks  Precautions: PTSD, hx of repeated concussions, high symptom irritability      HEP: VORx1 stand, saccades stand, static balance FTEC, walking in home or outside with cane, mini squats, heel raises, hip abd  Diaphragmatic breathing, journaling  Functional Impairment to Address  8/30 Activity 9/15 9/21/22  /94 manually 9/29/22    2  Bending over to do laundry/ manage cat litter/ clean cat vomit  -Bend to  bean bag from stool, turn 90deg and toss  5x 2 sets    - cone from various heights, walk across mat and place on chair (20ft)  6x 2 sets -Bend over tap cones at various heights x12    -Bryn Bolden toss catching high and low  10x Retrieve objects from 8" surface, return to stand, 5 x 2 sets  From 2" surface,   5 x 2 sets      2    Turning while ambulating   -Walking to targets and 180 deg turn x10 - 3# weights, turn and place on counter behind  6'    -Walking 360 deg turns on targets 30ft x3 Walk and turn 360 degrees    3  Walking on uneven surfaces  -Walking across mat, with and w/o engaging in conversion  8 laps  Walking across mat with bean bags underneath, within parallel bars, 5/10 unsteadiness, x 3 trips, 6-7/10 unsteadiness  112 bpm        4  Improve overall activity tolerance / strength -200ft overground walking no AD    Mini squats 2x10    Marches 2 5# ankles, 2x10    Hip abd 2 5# x10 ea     -200ft overground walking with cane  100ft no cane  112 bpm to start  100 feet no cane  100 feet no cane with fidget spinner  SciFit about 80 steps/min, about 30 min, 7/20 RPE, 113 bpm      Goal: walk a full block without the cane  Activity at home:  Stairs, sit to stand   5  Education        6  Return to driving    Currently transported by friends and family  Manuals 8/8 8/15 8/25 9/15                                                   Neuro Re-Ed                                 Education    20'                    Kicking soccer ball x10 SOLO          Ball toss  In standing 1min    Walking 60ft  Walking 20ft x4         Static balance            Bend down and touch 19" object <> 17" object           Incline negotiation           Overgound ambulation 300ft no AD 100ft with QC    200ft no AD (last 80ft with stacking cones for distraction)             Foam Beam           Step up/down  6" no AD  x10 SOLO          Backwards walk 4ft x8 SOLO 4ft x6 SOLO         Walking with HTs, HN           Walking to [employer] cone from stool to high surface and stack  x8 Carry cone from stool to high surface and stack  x4         Step over cones           Speed Walking SOLO  ~28ft with 1 curve   x8    Best: 13 8s          Ther Ex           6MWT           Scifit  L4 15min L3 10 min         Overground walking           LE strengthening                                      Modalities

## 2022-10-04 ENCOUNTER — APPOINTMENT (OUTPATIENT)
Dept: PHYSICAL THERAPY | Facility: REHABILITATION | Age: 37
End: 2022-10-04

## 2022-10-05 ENCOUNTER — OFFICE VISIT (OUTPATIENT)
Dept: PHYSICAL THERAPY | Facility: REHABILITATION | Age: 37
End: 2022-10-05

## 2022-10-05 DIAGNOSIS — F43.10 POST-TRAUMATIC STRESS: ICD-10-CM

## 2022-10-05 DIAGNOSIS — G44.319 ACUTE POST-TRAUMATIC HEADACHE, NOT INTRACTABLE: ICD-10-CM

## 2022-10-05 DIAGNOSIS — S09.90XD INJURY OF HEAD, SUBSEQUENT ENCOUNTER: Primary | ICD-10-CM

## 2022-10-05 PROCEDURE — 97112 NEUROMUSCULAR REEDUCATION: CPT | Performed by: PHYSICAL THERAPIST

## 2022-10-13 ENCOUNTER — APPOINTMENT (OUTPATIENT)
Dept: PHYSICAL THERAPY | Facility: REHABILITATION | Age: 37
End: 2022-10-13

## 2022-10-14 ENCOUNTER — TELEPHONE (OUTPATIENT)
Dept: NEUROLOGY | Facility: CLINIC | Age: 37
End: 2022-10-14

## 2022-10-14 DIAGNOSIS — G43.009 MIGRAINE WITHOUT AURA AND WITHOUT STATUS MIGRAINOSUS, NOT INTRACTABLE: Primary | ICD-10-CM

## 2022-10-14 RX ORDER — TOPIRAMATE 25 MG/1
TABLET ORAL
Qty: 120 TABLET | Refills: 4 | Status: SHIPPED | OUTPATIENT
Start: 2022-10-14

## 2022-10-14 NOTE — TELEPHONE ENCOUNTER
Sorry to hear she is not feeling well, but sounds like not being able to tolerate amitriptyline has giving us an unexpected opportunity to see how she does off of it and sounds like she overall feels better off of it which is fine with me to stop  However, I think the sleep issue may be also related to a possible sleep disorder and do still recommend she schedule the sleep provider evaluation I ordered in March 2022  Topiramate may work even better if tolerated without bothersome side effects  Please let her know I will start low and gradually increase to 50 mg b i d  Once at this dose we keep her there for 2-3 more months and then determine if there has been decrease in headache and migraines days by at least 50%  If bothersome side effects sooner, we would discontinue and then would send in emgality  Most common side effect from topiramate would be tingling which typically fades away as the body gets used to it  Rarely causes kidney stones  Sometimes can cause weight loss     ---------------------------  - Topiramate 25 mg nightly for 1 week, then increase to 25 mg in a m  And 25 mg in p m  For 1 week, then take 25 mg in a m  And 50 mg in p m  For 1 week, then take 50 mg in a m  and 50 mg in p m  And continue  - generally the common side effects improve as your body gets used to the medication  If we need to spread out a more gradual increase of the medication on a longer scale we can, just call if any questions or concerns  - if necessary, if the a m  dose is causing side effects we can always have you take the full dose at night instead    - important to know per data, this medication may, but typically does not affect birth control unless you are taking 200 mg daily or more and I highly recommend being on birth control while on this medication due to possible significant detrimental effects to fetus if you were to get pregnant     The medication you are taking may impact pregnancy   It has been associated with birth defects and learning problems if taken during pregnancy  Thus, it is important to avoid unplanned pregnancy while taking this medication  In the future, if you plan to become pregnant, then you should discuss this with your neurologist since medication adjustments may be indicated

## 2022-10-14 NOTE — TELEPHONE ENCOUNTER
Pt made aware of below  She is agreeable to trying topiramate  Please send script to pharmacy        She will look into sleep referral

## 2022-10-14 NOTE — TELEPHONE ENCOUNTER
pt left vm stating that she has been very sick to her stomach the last couple days and has not been taking her amitriptyline  she is asking that when she restarts taking it, can she restart at 50mg or does she need to build up again  she still has 10mg tabs if needed  she also states that she has not taken them for 4 days and finally slept last night,  she has been having trouble sleeping since she started the regimen  so she just wanted to make you aware of this  [phone]    called pt  Her period just started and nausea may be related to this or some type of bug  States that it is difficult to tell if amitriptyline has been effective as Work and computer use for 8 was the primary driving factor in trigging her migraines  Number of migraines have decreased but states that her low lying headaches have been getting worse  everyday background pain getting worse  They used to be a 1/10 and now they are 4/10  She has been doing exposure therapy and can do 2 5-3 hours of screen time  Was only getting 2-4 hours of sleep ever since she got up to 50mg of amitriptyline  As she got up to the higher doses she could not stay asleep for a couple hours at a time  She would prefer not to continue amitriptyline due to the sleeping issues and baseline increase in pain    Per office note-  future options:  topiramate, CGRP med  She is agreeable to above if that is what you recommend    please advise  [phone] to leave detailed message

## 2022-10-18 ENCOUNTER — OFFICE VISIT (OUTPATIENT)
Dept: PHYSICAL THERAPY | Facility: REHABILITATION | Age: 37
End: 2022-10-18

## 2022-10-18 DIAGNOSIS — G44.319 ACUTE POST-TRAUMATIC HEADACHE, NOT INTRACTABLE: ICD-10-CM

## 2022-10-18 DIAGNOSIS — F43.10 POST-TRAUMATIC STRESS: ICD-10-CM

## 2022-10-18 DIAGNOSIS — S09.90XD INJURY OF HEAD, SUBSEQUENT ENCOUNTER: Primary | ICD-10-CM

## 2022-10-18 PROCEDURE — 97112 NEUROMUSCULAR REEDUCATION: CPT | Performed by: PHYSICAL THERAPIST

## 2022-10-18 NOTE — PROGRESS NOTES
Treatment Note    Today's date: 10/18/2022  Patient name: Colette Covarrubias  : 1985  MRN: [id number]  Referring provider: Sd Mccartney MD  Dx:   Encounter Diagnosis     ICD-10-CM    1  Injury of head, subsequent encounter  S09  90XD    2  Acute post-traumatic headache, not intractable  G44 319    3  Post-traumatic stress  F43 10         Subjective:   Was under the weather last week so stopped taking the amitriptyline, noted she was sleeping better  Now sleeping to 8 hours at night  But is more sleepy during the day, can lay down and easily fall asleep 2-3 hours  Not having headaches as much  Background level of headache has returned to base level of 1/10  Walking for exercise outside 45-60 minutes  Has been trying to carry things on stairs and go up and down a bit more  Will schedule sleep evaluation  Objective: See treatment diary below     Patient-Specific Functional Scale   Task is scored 0 (unable to perform activity) to 10 (ability to perform activity independently)  Activity    8/25 10/18/22   1  Bending over to do laundry/ manage cat litter/ clean cat vomit  3-4/10  4/10 6-7/10   2  Turning while walking  7/10  7/10 8/10   3  Walking on uneven surfaces  -9/10  8 5/10 9/10   4  Not feeling dizzy after engaged in activities  Currently, can last for 2-60 minutes after activity  Most likely to provoke symptoms is bending over, turn at speed or sharp angles, or with using exercise bike  5            ASSESSMENT  Aneudy Mckeon is progressing in her capacity to return to prior amounts of physical activity, mobility and engagement in oculomotor tasks, although we are not back to baseline  It's great that Aneudy Mckeon has been able to resume 8 hours of sleep at night  Recommend limiting daytime naps with use of timers/alarms  It's also great that Aneudy Mckeon has been able to increase walking duration to 45-60 minutes    Increase speed, and look for opportunities to continue to carry rather than use the cane  Return to driving is a priority  We discussed habituation exercises to start addressing this goal         Plan: Functional mobility, neuromuscular re-education, 1x/week for 4 weeks - 6 weeks  Precautions: PTSD, hx of repeated concussions, high symptom irritability      HEP:  Walking for exercise, continue with current duration but include episodes of faster walking; optokinetics of driving / looking at road signs as a passenger    Functional Impairment to Address  8/30 Activity 9/15 9/21/22  /94 manually 9/29/22  10/5 10/18/22     2  Bending over to do laundry/ manage cat litter/ clean cat vomit  -Bend to  bean bag from stool, turn 90deg and toss  5x 2 sets    - cone from various heights, walk across mat and place on chair (20ft)  6x 2 sets -Bend over tap cones at various heights x12    -[employer] toss catching high and low  10x Retrieve objects from 8" surface, return to stand, 5 x 2 sets  From 2" surface,   5 x 2 sets        Retreive object from 16" surface, then 6" surface  Carry 10ft place on christian surface  4 sets of 4 6-7/10   2  Turning while ambulating   -Walking to targets and 180 deg turn x10 - 3# weights, turn and place on counter behind  6'    -Walking 360 deg turns on targets 30ft x3 Walk and turn 360 degrees  Through session with other exercises  8/10    Walking identifying red laser on surfaces, requiring eye and head movement/scanning while walking, 250 feet   3  Walking on uneven surfaces  -Walking across mat, with and w/o engaging in conversion  8 laps  Walking across mat with bean bags underneath, within parallel bars, 5/10 unsteadiness, x 3 trips, 6-7/10 unsteadiness  112 bpm      Walking across mat bean bags (in //bars) under  With and w/o fidget spinner  5 laps 9/10   4     Improve overall activity tolerance / strength -200ft overground walking no AD    Mini squats 2x10    Marches 2 5# ankles, 2x10    Hip abd 2 5# x10 ea     -200ft overground walking with cane  100ft no cane  112 bpm to start  100 feet no cane  100 feet no cane with fidget spinner  SciFit about 80 steps/min, about 30 min, 7/20 RPE, 113 bpm      Goal: walk a full block without the cane  Activity at home:  Stairs, sit to stand Overground walking no cane  100ft x4  Timed to encourage speed  Best: 53sec    Scifit at end  10min SciFit level 3, 100 steps/min, 15 min  Level 1, about 95 steps/min, 2 min      5  Education          6  Return to driving    Currently transported by friends and family  Has not been cleared to drive due to intermittent bouts of dizziness        Stood outside next to [employer], reading aloud numbers from license plates of passing cars (about 4:45pm), about 8, then 6    Reviewed optokinetic training using prescribed youtube channel Corinne Shy, DPT), of walking through store, using driving optokinetics and scanning

## 2022-10-25 ENCOUNTER — OFFICE VISIT (OUTPATIENT)
Dept: PHYSICAL THERAPY | Facility: REHABILITATION | Age: 37
End: 2022-10-25

## 2022-10-25 DIAGNOSIS — F43.10 POST-TRAUMATIC STRESS: ICD-10-CM

## 2022-10-25 DIAGNOSIS — S09.90XD INJURY OF HEAD, SUBSEQUENT ENCOUNTER: Primary | ICD-10-CM

## 2022-10-25 DIAGNOSIS — G44.319 ACUTE POST-TRAUMATIC HEADACHE, NOT INTRACTABLE: ICD-10-CM

## 2022-10-25 PROCEDURE — 97112 NEUROMUSCULAR REEDUCATION: CPT | Performed by: PHYSICAL THERAPIST

## 2022-10-25 NOTE — PROGRESS NOTES
Treatment Note    Today's date: 10/25/2022  Patient name: Radha Morin  : 1985  MRN: [id number]  Referring provider: Samir Gilbert MD  Dx:   Encounter Diagnosis     ICD-10-CM    1  Injury of head, subsequent encounter  S09  90XD    2  Acute post-traumatic headache, not intractable  G44 319    3  Post-traumatic stress  F43 10         Subjective:   Stopped taking a migraine prevention med under guidance of her neurologist  Cathy Jose this helped her to feel much better overall especially with sleep  Feels she is improving with symptom management  Objective: See treatment diary below     Patient-Specific Functional Scale   Task is scored 0 (unable to perform activity) to 10 (ability to perform activity independently)  Activity    8/25 10/18/22   1  Bending over to do laundry/ manage cat litter/ clean cat vomit  3-4/10  4/10 6-7/10   2  Turning while walking  7/10  7/10 8/10   3  Walking on uneven surfaces  -9/10  8 5/10 9/10   4  Not feeling dizzy after engaged in activities  Currently, can last for 2-60 minutes after activity  Most likely to provoke symptoms is bending over, turn at speed or sharp angles, or with using exercise bike  5            ASSESSMENT  Tolerated treatment well today  Demonstrating overall improvement in mobility  Less staggering and guarded posture with gait today and able to increased speed  Improved tolerance to reaching down for objects at christian of a stool today compared to previous visit  Still requiring some rest breaks through out due to dizziness  Reivewed HEP and encouraged continued gradual progression of activity tolerance  Would benefit from continued PT  Plan: Functional mobility, neuromuscular re-education, 1x/week for 4 weeks - 6 weeks       Precautions: PTSD, hx of repeated concussions, high symptom irritability      HEP:  Walking for exercise, continue with current duration but include episodes of faster walking; optokinetics of driving / looking at road signs as a passenger    Functional Impairment to Address  8/30 Activity 9/15 9/21/22  /94 manually 9/29/22  10/5 10/18/22   10/25   2  Bending over to do laundry/ manage cat litter/ clean cat vomit  -Bend to  bean bag from stool, turn 90deg and toss  5x 2 sets    - cone from various heights, walk across mat and place on chair (20ft)  6x 2 sets -Bend over tap cones at various heights x12    -Marilia Bonier toss catching high and low  10x Retrieve objects from 8" surface, return to stand, 5 x 2 sets  From 2" surface,   5 x 2 sets        Retreive object from 16" surface, then 6" surface  Carry 10ft place on christian surface  4 sets of 4 6-7/10 - cone from stool, turn place on high shelf  5x 3 sets    2  Turning while ambulating   -Walking to targets and 180 deg turn x10 - 3# weights, turn and place on counter behind  6'    -Walking 360 deg turns on targets 30ft x3 Walk and turn 360 degrees  Through session with other exercises  8/10    Walking identifying red laser on surfaces, requiring eye and head movement/scanning while walking, 250 feet Walking in //bars with random head turns/nods on command  5 laps    Ball toss fwd walking  Tossing ball slightly L/R or high  4 laps in //bars     3  Walking on uneven surfaces  -Walking across mat, with and w/o engaging in conversion  8 laps  Walking across mat with bean bags underneath, within parallel bars, 5/10 unsteadiness, x 3 trips, 6-7/10 unsteadiness  112 bpm      Walking across mat bean bags (in //bars) under  With and w/o fidget spinner  5 laps 9/10    4     Improve overall activity tolerance / strength -200ft overground walking no AD    Mini squats 2x10    Marches 2 5# ankles, 2x10    Hip abd 2 5# x10 ea     -200ft overground walking with cane  100ft no cane  112 bpm to start  100 feet no cane  100 feet no cane with fidget spinner  SciFit about 80 steps/min, about 30 min, 7/20 RPE, 113 bpm      Goal: walk a full block without the cane   Activity at home:  Stairs, sit to stand Overground walking no cane  100ft x4  Timed to encourage speed  Best: 53sec    Scifit at end  10min SciFit level 3, 100 steps/min, 15 min  Level 1, about 95 steps/min, 2 min    100ft with turn around, speed walking, no AD  Best time 33s x4    Scifit L3 ~100spm, 15min   5  Education           6  Return to driving    Currently transported by friends and family  Has not been cleared to drive due to intermittent bouts of dizziness        Stood outside next to [employer], reading aloud numbers from license plates of passing cars (about 4:45pm), about 8, then 6    Reviewed optokinetic training using prescribed youtube channel Cherelle Don DPT), of walking through store, using driving optokinetics and scanning

## 2022-11-01 ENCOUNTER — APPOINTMENT (OUTPATIENT)
Dept: PHYSICAL THERAPY | Facility: REHABILITATION | Age: 37
End: 2022-11-01

## 2022-11-03 ENCOUNTER — APPOINTMENT (OUTPATIENT)
Dept: PHYSICAL THERAPY | Facility: REHABILITATION | Age: 37
End: 2022-11-03

## 2022-11-04 ENCOUNTER — OFFICE VISIT (OUTPATIENT)
Dept: FAMILY MEDICINE CLINIC | Facility: CLINIC | Age: 37
End: 2022-11-04

## 2022-11-04 VITALS
HEART RATE: 85 BPM | OXYGEN SATURATION: 95 % | WEIGHT: 277.4 LBS | HEIGHT: 63 IN | BODY MASS INDEX: 49.15 KG/M2 | DIASTOLIC BLOOD PRESSURE: 68 MMHG | SYSTOLIC BLOOD PRESSURE: 122 MMHG

## 2022-11-04 DIAGNOSIS — R51.9 ACUTE INTRACTABLE HEADACHE, UNSPECIFIED HEADACHE TYPE: Primary | ICD-10-CM

## 2022-11-04 DIAGNOSIS — G43.009 MIGRAINE WITHOUT AURA AND WITHOUT STATUS MIGRAINOSUS, NOT INTRACTABLE: ICD-10-CM

## 2022-11-04 RX ORDER — METHYLPREDNISOLONE 4 MG/1
TABLET ORAL
Qty: 21 EACH | Refills: 0 | Status: SHIPPED | OUTPATIENT
Start: 2022-11-04

## 2022-11-04 NOTE — ASSESSMENT & PLAN NOTE
As per discussion headache, question if this is truly a migraine that she is currently having  No gross neurologic deficits today  With that, will recommend she try 1 dose of the rizatriptan, and if that does not help, start Medrol  Follow-up with Neurology as well

## 2022-11-04 NOTE — ASSESSMENT & PLAN NOTE
Patient with significant pain from headache  Starts top of the skull, as well as across the forehead into the temples  Can check some blood work to see if there any other possibilities with this, but at this point would consider using Steroids  Would recommend 1 dose of rizatriptan, and if that has not made any difference with the headache, start steroid taper  Will use Medrol Dosepak, which is short-term only

## 2022-11-04 NOTE — PROGRESS NOTES
Name: Cindy Cortes      : 1985      MRN: [id number]  Encounter Provider: Eleno Rodríguez MD  Encounter Date: 2022   Encounter department: [facility] PRIMARY CARE    Assessment & Plan     1  Acute intractable headache, unspecified headache type  Assessment & Plan:  Patient with significant pain from headache  Starts top of the skull, as well as across the forehead into the temples  Can check some blood work to see if there any other possibilities with this, but at this point would consider using Steroids  Would recommend 1 dose of rizatriptan, and if that has not made any difference with the headache, start steroid taper  Will use Medrol Dosepak, which is short-term only  Orders:  -     methylPREDNISolone 4 MG tablet therapy pack; Use as directed on package    2  Migraine without aura and without status migrainosus, not intractable  Assessment & Plan:  As per discussion headache, question if this is truly a migraine that she is currently having  No gross neurologic deficits today  With that, will recommend she try 1 dose of the rizatriptan, and if that does not help, start Medrol  Follow-up with Neurology as well  Orders:  -     methylPREDNISolone 4 MG tablet therapy pack; Use as directed on package           Subjective      Chief Complaint   Patient presents with   • Headache     Pt c/o pressure at the top of her skull and temple and also on the top of her head on the left side  She is also having light sensitivity  Pt states she gets worse then this with a migraine but this feels like she is stuck pre migraine currently since Saturday  kw       Please see CC  Has had HA since Saturday  Not getting better, and not getting worse  avoiding triggers  Meds: Ibuprofen, but minimal to no help  Neuro started her on preventive treatment, Topamax  Review of Systems   Constitutional: Negative  HENT: Negative  Eyes: Positive for photophobia  Respiratory: Negative  Cardiovascular: Negative  Neurological: Positive for headaches  Per HPI       Current Outpatient Medications on File Prior to Visit   Medication Sig   • Multiple Vitamins-Minerals (ONE-A-DAY WOMENS PO) Take by mouth   • rizatriptan (MAXALT) 10 MG tablet Take 1 tablet (10 mg total) by mouth once as needed for migraine May repeat in 2 hours if needed  Max 2/24 hours, 9/month  • topiramate (TOPAMAX) 25 mg tablet 1 tab PO QHS for 1 week, increase as tolerated to 1 tab BID for 1 week, then 1 tab QAM and 2 tabs QHS for 1 week and finish at 2 tabs BID  • [DISCONTINUED] amitriptyline (ELAVIL) 50 mg tablet 50mg nightly (Patient not taking: No sig reported)   • [DISCONTINUED] meclizine (ANTIVERT) 25 mg tablet Take 1 tablet (25 mg total) by mouth 3 (three) times a day as needed for dizziness for up to 20 doses (Patient not taking: No sig reported)       Objective     /68 (BP Location: Left arm, Patient Position: Sitting)   Pulse 85   Ht 5' 3" (1 6 m)   Wt 126 kg (277 lb 6 4 oz)   SpO2 95%   BMI 49 14 kg/m²     Physical Exam  Vitals and nursing note reviewed  Constitutional:       General: She is in acute distress  Appearance: She is not ill-appearing  Neurological:      Mental Status: She is alert  Cranial Nerves: Cranial nerves are intact  Sensory: Sensation is intact  Motor: Motor function is intact  Coordination: Coordination is intact  Comments: Patient using tinted glasses over her regular classes         Yair Pennington MD

## 2022-11-04 NOTE — PATIENT INSTRUCTIONS
1  Acute intractable headache, unspecified headache type  Assessment & Plan:  Patient with significant pain from headache  Starts top of the skull, as well as across the forehead into the temples  Can check some blood work to see if there any other possibilities with this, but at this point would consider using Steroids  Would recommend 1 dose of rizatriptan, and if that has not made any difference with the headache, start steroid taper  Will use Medrol Dosepak, which is short-term only  Orders:  -     methylPREDNISolone 4 MG tablet therapy pack; Use as directed on package    2  Migraine without aura and without status migrainosus, not intractable  Assessment & Plan:  As per discussion headache, question if this is truly a migraine that she is currently having  No gross neurologic deficits today  With that, will recommend she try 1 dose of the rizatriptan, and if that does not help, start Medrol  Follow-up with Neurology as well  Orders:  -     methylPREDNISolone 4 MG tablet therapy pack; Use as directed on package    COVID 19 Instructions    Edie Rivas was advised to limit contact with others to essential tasks such as getting food, medications, and medical care  Proper handwashing reviewed, and Hand sanitzer when washing is not available  If the patient develops symptoms of COVID 19, the patient should call the office as soon as possible  For 1126-2962 Flu season, it is strongly recommended that Flu Vaccinations be obtained  Virtual Visits:  Arsenio: This works on smart phones (any phone with Internet browsing capability)  You should get a text message when the provider is ready to see you  Click on the link in the text message, and the call should start  You will need to type in your name, and allow camera and microphone access  This is HIPPA compliant, and secure  If you have not already done so, get immunized to COVID 19        We are committed to getting you vaccinated as soon as possible and will be closely following CDC and [facility]  guidelines as they are released and revised  Please refer to our COVID-19 vaccine webpage for the most up to date information on the vaccine and our distribution efforts  This site will also have the most up to date recommendations for COVID booster vaccine  Evavril tn    Call [phone]), option 7    OUR NEW LOCATION:    [address], [address] [place], [place] [address] [place], [place], [address]  Fax: [phone]    Lab services and OB/GYN are at this location as well

## 2022-11-08 ENCOUNTER — OFFICE VISIT (OUTPATIENT)
Dept: PHYSICAL THERAPY | Facility: REHABILITATION | Age: 37
End: 2022-11-08

## 2022-11-08 DIAGNOSIS — G44.319 ACUTE POST-TRAUMATIC HEADACHE, NOT INTRACTABLE: ICD-10-CM

## 2022-11-08 DIAGNOSIS — S09.90XD INJURY OF HEAD, SUBSEQUENT ENCOUNTER: Primary | ICD-10-CM

## 2022-11-08 DIAGNOSIS — F43.10 POST-TRAUMATIC STRESS: ICD-10-CM

## 2022-11-08 NOTE — PROGRESS NOTES
Re-Evaluation     Today's date: 2022  Patient name: Tai Mcneal  : 1985  MRN: [id number]  Referring provider: Stefany Marley MD  Dx:   Encounter Diagnosis     ICD-10-CM    1  Injury of head, subsequent encounter  S09  90XD    2  Acute post-traumatic headache, not intractable  G44 319    3  Post-traumatic stress  F43 10         Subjective:   Last week was having more migraine headache that usual, however despite this is still doing better with her dizziness, mobility, and activity tolerance  Headache still present today, but not as severe  Feels therapy has been helpful  Making small but steady progress  Objective: See treatment diary below     Patient-Specific Functional Scale   Task is scored 0 (unable to perform activity) to 10 (ability to perform activity independently)  Activity    8/25 10/18/22 11/8   1  Bending over to do laundry/ manage cat litter/ clean cat vomit  3-4/10  4/10 6-7/10 7/10   2  Turning while walking  7/10  7/10 8/10 9/10   3  Walking on uneven surfaces  -9/10  8 5/10 9/10 9/10   4  Not feeling dizzy after engaged in activities  Currently, can last for 2-60 minutes after activity  Most likely to provoke symptoms is bending over, turn at speed or sharp angles, or with using exercise bike  Overall activity tolerance improving, able to perform most activities, but still has post activity dizziness  5             Gait speed  Self Selected:  1 35m/s  Fast Speed:  1 6m/s      Updated Goals:  Pt will demonstrate at least 3 point improvement on PSFS - progressing   Pt will be able to achieve gait speed of at least 0 9m/s - MET     Pt will demonstrate a least 4 point improvement on FGA - not met  Pt will demonstrate competency with HEP and plan for managing and further progressing tolerance to daily activities - progressing     Assessment: Since last re-eval, Vernadine Ormond has continued to demonstrate improvements   She demonstrates steady progress with functional gains based on PSFS  Becoming more able to manage her symptoms while increasing time tolerating functional activities and recreation  Gait speed has greatly improved since last re-eval, now able to walk more functional speeds for community ambulation, and also a lower risk for falls  She continues to demonstrate significant photosensitivity with use of sunglasses indoors, significant dizziness with quick movements requiring use of quad cane at times, and general poor tolerance to functional activity  As she has previously demonstrated, her symptoms at times continue to fluctuate greatly required modifications of her exercise and POC by therapist to ensure maximized adherence and progress  She would benefit from continued skilled PT to address these deficits and maximize function  Plan: Functional mobility, neuromuscular re-education, 1x/week for  6 weeks  Precautions: PTSD, hx of repeated concussions, high symptom irritability      HEP:  Walking for exercise, continue with current duration but include episodes of faster walking; optokinetics of driving / looking at road signs as a passenger    Functional Impairment to Address  9/29/22  10/5 10/18/22   10/25 11/8   2  Bending over to do laundry/ manage cat litter/ clean cat vomit  Retreive object from 16" surface, then 6" surface  Carry 10ft place on christian surface  4 sets of 4 6-7/10 - cone from stool, turn place on high shelf  5x 3 sets     2  Turning while ambulating  Through session with other exercises  8/10    Walking identifying red laser on surfaces, requiring eye and head movement/scanning while walking, 250 feet Walking in //bars with random head turns/nods on command  5 laps    Ball toss fwd walking  Tossing ball slightly L/R or high  4 laps in //bars   Walking HT's using cane, 100ft x2    Walking with self ball toss 3 laps in //bars     3  Walking on uneven surfaces  Walking across mat bean bags (in //bars) under   With and w/o chari spinner  5 laps 9/10  Walking stepping on foam pads in //bars 3 laps    4  Improve overall activity tolerance / strength SciFit about 80 steps/min, about 30 min, 7/20 RPE, 113 bpm      Goal: walk a full block without the cane  Activity at home:  Stairs, sit to stand Overground walking no cane  100ft x4  Timed to encourage speed  Best: 53sec    Scifit at end  10min SciFit level 3, 100 steps/min, 15 min  Level 1, about 95 steps/min, 2 min    100ft with turn around, speed walking, no AD  Best time 33s x4    Scifit L3 ~100spm, 15min Declined scifit today   5  Education         6  Return to driving Currently transported by friends and family  Has not been cleared to drive due to intermittent bouts of dizziness        Stood outside next to [employer], reading aloud numbers from license plates of passing cars (about 4:45pm), about 8, then 6    Reviewed optokinetic training using prescribed youtube channel Reford Nolberto, DPT), of walking through store, using driving optokinetics and scanning

## 2022-11-15 ENCOUNTER — APPOINTMENT (OUTPATIENT)
Dept: PHYSICAL THERAPY | Facility: REHABILITATION | Age: 37
End: 2022-11-15

## 2022-11-18 ENCOUNTER — APPOINTMENT (OUTPATIENT)
Dept: PHYSICAL THERAPY | Facility: REHABILITATION | Age: 37
End: 2022-11-18

## 2022-11-22 ENCOUNTER — OFFICE VISIT (OUTPATIENT)
Dept: PHYSICAL THERAPY | Facility: REHABILITATION | Age: 37
End: 2022-11-22

## 2022-11-22 DIAGNOSIS — F43.10 POST-TRAUMATIC STRESS: ICD-10-CM

## 2022-11-22 DIAGNOSIS — G44.319 ACUTE POST-TRAUMATIC HEADACHE, NOT INTRACTABLE: ICD-10-CM

## 2022-11-22 DIAGNOSIS — S09.90XD INJURY OF HEAD, SUBSEQUENT ENCOUNTER: Primary | ICD-10-CM

## 2022-11-22 NOTE — PROGRESS NOTES
Daily Note     Today's date: 2022  Patient name: Marcia Dixon  : 1985  MRN: [id number]  Referring provider: Vicente Sands MD  Dx:   Encounter Diagnosis     ICD-10-CM    1  Injury of head, subsequent encounter  S09  90XD       2  Acute post-traumatic headache, not intractable  G44 319       3  Post-traumatic stress  F43 10            Subjective: Headaches have been bad the past two week  Her PCP recommended medication change which only helped a little  Possibly will trial steroid pack for headaches in future  Been trying to stay active  Felt suddenly very dizzy on Saturday while walking into her kitchen  This is better now but not fully  Objective: See treatment diary below     Patient-Specific Functional Scale   Task is scored 0 (unable to perform activity) to 10 (ability to perform activity independently)  Activity    8/25 10/18/22 11/8   1  Bending over to do laundry/ manage cat litter/ clean cat vomit  3-4/10  4/10 6-7/10 7/10   2  Turning while walking  7/10  7/10 8/10 9/10   3  Walking on uneven surfaces  -9/10  8 5/10 9/10 9/10   4  Not feeling dizzy after engaged in activities  Currently, can last for 2-60 minutes after activity  Most likely to provoke symptoms is bending over, turn at speed or sharp angles, or with using exercise bike  Overall activity tolerance improving, able to perform most activities, but still has post activity dizziness  5             Assessment: Tolerated treatment fair  Overall pt with slower gait and more hesitancy and instability compared to prior visit  Exercises regressed today to compensate  Pt pt goals focused on walking today while maintaining confidence and stability in various situations that challenge stability  Seated rest breaks needed due to nausea with increased dizziness  Recommended continuing HEP and activity within tolerance despite headache or dizziness within moderate symptom aggravation  Would benefit from continued PT  Plan: Functional mobility, neuromuscular re-education, 1x/week for  6 weeks  Precautions: PTSD, hx of repeated concussions, high symptom irritability      HEP:  Walking for exercise, continue with current duration but include episodes of faster walking; optokinetics of driving / looking at road signs as a passenger    Functional Impairment to Address  10/18/22   10/25 11/8 11/22   2  Bending over to do laundry/ manage cat litter/ clean cat vomit  6-7/10 - cone from stool, turn place on high shelf  5x 3 sets      2  Turning while ambulating  8/10    Walking identifying red laser on surfaces, requiring eye and head movement/scanning while walking, 250 feet Walking in //bars with random head turns/nods on command  5 laps    Ball toss fwd walking  Tossing ball slightly L/R or high  4 laps in //bars   Walking HT's using cane, 100ft x2    Walking with self ball toss 3 laps in //bars   Walk 10ft take dumbbell from counter top and carry to other counter top  4x 4 sets   3  Walking on uneven surfaces  9/10  Walking stepping on foam pads in //bars 3 laps  Walking across mat in //bars 4 laps       4  Improve overall activity tolerance / walking tolerance / strength SciFit level 3, 100 steps/min, 15 min  Level 1, about 95 steps/min, 2 min    100ft with turn around, speed walking, no AD  Best time 33s x4    Scifit L3 ~100spm, 15min Declined scifit today 200ft with quad cane  100ft no AD    Walking stop/go 200ft QC       5  Education        6  Return to driving Has not been cleared to drive due to intermittent bouts of dizziness        Stood outside next to [employer], reading aloud numbers from license plates of passing cars (about 4:45pm), about 8, then 6    Reviewed optokinetic training using prescribed youtube channel Reford Forth, DPT), of walking through store, using driving optokinetics and scanning

## 2022-11-28 ENCOUNTER — OFFICE VISIT (OUTPATIENT)
Dept: PHYSICAL THERAPY | Facility: REHABILITATION | Age: 37
End: 2022-11-28

## 2022-11-28 DIAGNOSIS — F43.10 POST-TRAUMATIC STRESS: ICD-10-CM

## 2022-11-28 DIAGNOSIS — G44.319 ACUTE POST-TRAUMATIC HEADACHE, NOT INTRACTABLE: ICD-10-CM

## 2022-11-28 DIAGNOSIS — S09.90XD INJURY OF HEAD, SUBSEQUENT ENCOUNTER: Primary | ICD-10-CM

## 2022-11-28 NOTE — PROGRESS NOTES
Daily Note     Today's date: 2022  Patient name: Sharon Conteh  : 1985  MRN: [id number]  Referring provider: Allison Ye MD  Dx:   Encounter Diagnosis     ICD-10-CM    1  Injury of head, subsequent encounter  S09  90XD       2  Acute post-traumatic headache, not intractable  G44 319       3  Post-traumatic stress  F43 10            Subjective: Headaches have still been bad  Wants to work on her stability when walking       Objective: See treatment diary below     Patient-Specific Functional Scale   Task is scored 0 (unable to perform activity) to 10 (ability to perform activity independently)  Activity    8/25 10/18/22 11/8   1  Bending over to do laundry/ manage cat litter/ clean cat vomit  3-4/10  4/10 6-7/10 7/10   2  Turning while walking  7/10  7/10 8/10 9/10   3  Walking on uneven surfaces  8-9/10  8 5/10 9/10 9/10   4  Not feeling dizzy after engaged in activities  Currently, can last for 2-60 minutes after activity  Most likely to provoke symptoms is bending over, turn at speed or sharp angles, or with using exercise bike  Overall activity tolerance improving, able to perform most activities, but still has post activity dizziness  5             Assessment: Tolerated treatment fair  Slowly returning to prior levels of activity tolerance in therapy  Able to perform speed walking again today with rest breaks needed due to dizziness  Would benefit from continued PT  Plan: Functional mobility, neuromuscular re-education, 1x/week for  6 weeks  Precautions: PTSD, hx of repeated concussions, high symptom irritability      HEP:  Walking for exercise, continue with current duration but include episodes of faster walking; optokinetics of driving / looking at road signs as a passenger    Functional Impairment to Address  10/25 11/8 11/22 11/28   2  Bending over to do laundry/ manage cat litter/ clean cat vomit   - cone from stool, turn place on high shelf   5x 3 sets Take bean bag from chair to stool or wooden box  x10 in //bars   2  Turning while ambulating  Walking in //bars with random head turns/nods on command  5 laps    Ball toss fwd walking  Tossing ball slightly L/R or high  4 laps in //bars   Walking HT's using cane, 100ft x2    Walking with self ball toss 3 laps in //bars   Walk 10ft take dumbbell from counter top and carry to other counter top  4x 4 sets    3  Walking on uneven surfaces  Walking stepping on foam pads in //bars 3 laps  Walking across mat in //bars 4 laps     Walking across mat in //bars 4 laps    Standing on mat with ball toss  Progressing to bounce pass  5'     Walking stepping on/off foam pads  4 laps //bars    4  Improve overall activity tolerance / walking tolerance / strength 100ft with turn around, speed walking, no AD  Best time 33s x4    Scifit L3 ~100spm, 15min Declined scifit today 200ft with quad cane  100ft no AD    Walking stop/go 200ft QC     100ft QC  100ft no AD    Timed 100ft x3 noAD  Best: 52s       5  Education        6    Return to driving

## 2022-12-06 ENCOUNTER — OFFICE VISIT (OUTPATIENT)
Dept: PHYSICAL THERAPY | Facility: REHABILITATION | Age: 37
End: 2022-12-06

## 2022-12-06 DIAGNOSIS — G44.319 ACUTE POST-TRAUMATIC HEADACHE, NOT INTRACTABLE: ICD-10-CM

## 2022-12-06 DIAGNOSIS — F43.10 POST-TRAUMATIC STRESS: ICD-10-CM

## 2022-12-06 DIAGNOSIS — S09.90XD INJURY OF HEAD, SUBSEQUENT ENCOUNTER: Primary | ICD-10-CM

## 2022-12-06 NOTE — PROGRESS NOTES
Daily Note     Today's date: 2022  Patient name: Bianca Dye  : 1985  MRN: [id number]  Referring provider: Jong Bustillo MD  Dx:   Encounter Diagnosis     ICD-10-CM    1  Injury of head, subsequent encounter  S09  90XD       2  Acute post-traumatic headache, not intractable  G44 319       3  Post-traumatic stress  F43 10            Subjective: "Not a good day"      Objective: See treatment diary below     Patient-Specific Functional Scale   Task is scored 0 (unable to perform activity) to 10 (ability to perform activity independently)  Activity    8/25 10/18/22 11/8   1  Bending over to do laundry/ manage cat litter/ clean cat vomit  3-4/10  4/10 6-7/10 7/10   2  Turning while walking  7/10  7/10 8/10 9/10   3  Walking on uneven surfaces  -9/10  8 5/10 9/10 9/10   4  Not feeling dizzy after engaged in activities  Currently, can last for 2-60 minutes after activity  Most likely to provoke symptoms is bending over, turn at speed or sharp angles, or with using exercise bike  Overall activity tolerance improving, able to perform most activities, but still has post activity dizziness  5             Assessment: Tolerated treatment fair  Pt challenged by perception of dizziness and fatigue  Although we have discussed that she could go w/o an AD for gait, recommended that if she does utilize a cane that she use SPC over quad cane  Worked on progressing HEP today for generalized strength and conditioning with use of bands which she tolerated well  Bands provided for home  Standing rest breaks needed due to dizziness  Pt able to tolerated Scifit at end of visit with encouragement  Would benefit from continued PT  Plan: Functional mobility, neuromuscular re-education, 1x/week for  6 weeks       Precautions: PTSD, hx of repeated concussions, high symptom irritability      HEP:  Walking for exercise, continue with current duration but include episodes of faster walking; optokinetics of driving / looking at road signs as a passenger, hip abd, squats, marching (back up against wall)    Functional Impairment to Address  11/8 11/22 11/28 12/6   2  Bending over to do laundry/ manage cat litter/ clean cat vomit  Take bean bag from chair to stool or wooden box  x10 in //bars Take cone from variable heights, carry to high desk and place  6 x 2 sets in //bars    Standing bouncing ball (eyes on ball) 3min    Standing tossing ball up (eyes on ball) 3min     2  Turning while ambulating  Walking HT's using cane, 100ft x2    Walking with self ball toss 3 laps in //bars   Walk 10ft take dumbbell from counter top and carry to other counter top  4x 4 sets     3  Walking on uneven surfaces  Walking stepping on foam pads in //bars 3 laps  Walking across mat in //bars 4 laps     Walking across mat in //bars 4 laps    Standing on mat with ball toss  Progressing to bounce pass  5'     Walking stepping on/off foam pads  4 laps //bars     4  Improve overall activity tolerance / walking tolerance / strength Declined scifit today 200ft with quad cane  100ft no AD    Walking stop/go 200ft QC     100ft QC  100ft no AD    Timed 100ft x3 noAD  Best: 52s     Hip 3 way RTB (for HEP) 2x10 ea    Rows GTB 2x10    200ft with QC  200ft with SPC     Scifit L2 15min     5  Education        6    Return to driving

## 2022-12-13 ENCOUNTER — APPOINTMENT (OUTPATIENT)
Dept: PHYSICAL THERAPY | Facility: REHABILITATION | Age: 37
End: 2022-12-13

## 2022-12-16 ENCOUNTER — OFFICE VISIT (OUTPATIENT)
Dept: PHYSICAL THERAPY | Facility: REHABILITATION | Age: 37
End: 2022-12-16

## 2022-12-16 DIAGNOSIS — F43.10 POST-TRAUMATIC STRESS: ICD-10-CM

## 2022-12-16 DIAGNOSIS — S09.90XD INJURY OF HEAD, SUBSEQUENT ENCOUNTER: Primary | ICD-10-CM

## 2022-12-16 DIAGNOSIS — G44.319 ACUTE POST-TRAUMATIC HEADACHE, NOT INTRACTABLE: ICD-10-CM

## 2022-12-16 NOTE — PROGRESS NOTES
Daily Note     Today's date: 2022  Patient name: Fernanda Hook  : 1985  MRN: [id number]  Referring provider: Cher Garza MD  Dx:   Encounter Diagnosis     ICD-10-CM    1  Injury of head, subsequent encounter  S09  90XD       2  Acute post-traumatic headache, not intractable  G44 319       3  Post-traumatic stress  F43 10            Subjective: States she had a migraine most of the week, but it has calmed down now  Having a good day  Objective: See treatment diary below     Patient-Specific Functional Scale   Task is scored 0 (unable to perform activity) to 10 (ability to perform activity independently)  Activity    8/25 10/18/22 11/8   1  Bending over to do laundry/ manage cat litter/ clean cat vomit  3-4/10  4/10 6-7/10 7/10   2  Turning while walking  7/10  7/10 8/10 9/10   3  Walking on uneven surfaces  -9/10  8 5/10 9/10 9/10   4  Not feeling dizzy after engaged in activities  Currently, can last for 2-60 minutes after activity  Most likely to provoke symptoms is bending over, turn at speed or sharp angles, or with using exercise bike  Overall activity tolerance improving, able to perform most activities, but still has post activity dizziness  5             Assessment: Tolerated treatment well  Challenged with backwards walking reporting hesitancy with lack of visual feedback  Recommended desensitizing to changes in direction and backwards walking at home  Able to progress speed of ball tossing as pt comfort increased while standing on compliant surface  Utilized higher shelf today in which pt had to rise onto toes to reach challenging balance  Would benefit from continued PT  Plan: Functional mobility, neuromuscular re-education, 1x/week for  6 weeks       Precautions: PTSD, hx of repeated concussions, high symptom irritability      HEP:  Walking for exercise, continue with current duration but include episodes of faster walking; optokinetics of driving / looking at road signs as a passenger, hip abd, squats, marching (back up against wall)    Functional Impairment to Address  11/28 12/6 12/16   2  Bending over to do laundry/ manage cat litter/ clean cat vomit  Take bean bag from chair to stool or wooden box  x10 in //bars Take cone from variable heights, carry to high desk and place  6 x 2 sets in //bars    Standing bouncing ball (eyes on ball) 3min    Standing tossing ball up (eyes on ball) 3min   Take cone from chair, place on high or medium shelf  4x 6 sets    2  Turning while ambulating  Walking with changing directions  Fwd/bwd/turns  No AD  10min   3  Walking on uneven surfaces  Walking across mat in //bars 4 laps    Standing on mat with ball toss  Progressing to bounce pass  5'     Walking stepping on/off foam pads  4 laps //bars   Walking on mat outside bars  Bean bags under  5 laps    4  Improve overall activity tolerance / walking tolerance / strength 100ft QC  100ft no AD    Timed 100ft x3 noAD  Best: 52s     Hip 3 way RTB (for HEP) 2x10 ea    Rows GTB 2x10    200ft with QC  200ft with SPC     Scifit L2 15min   Overground walking no AD 300ft     Scifit L3 15min   5  Education       6    Return to driving

## 2022-12-20 ENCOUNTER — OFFICE VISIT (OUTPATIENT)
Dept: PHYSICAL THERAPY | Facility: REHABILITATION | Age: 37
End: 2022-12-20

## 2022-12-20 DIAGNOSIS — S09.90XD INJURY OF HEAD, SUBSEQUENT ENCOUNTER: Primary | ICD-10-CM

## 2022-12-20 DIAGNOSIS — F43.10 POST-TRAUMATIC STRESS: ICD-10-CM

## 2022-12-20 DIAGNOSIS — G44.319 ACUTE POST-TRAUMATIC HEADACHE, NOT INTRACTABLE: ICD-10-CM

## 2022-12-20 NOTE — PROGRESS NOTES
Re-Eval     Today's date: 2022  Patient name: Alejandra Montemayor  : 1985  MRN: [id number]  Referring provider: Deborah Sellers MD  Dx:   Encounter Diagnosis     ICD-10-CM    1  Injury of head, subsequent encounter  S09  90XD       2  Acute post-traumatic headache, not intractable  G44 319       3  Post-traumatic stress  F43 10            Subjective: Feeling ok today  Migraines have been more severe the last few weeks, but baseline head pain is better when migraines are not occurring  She attributes this to her changes in preventative migraine meds  Although dizziness with movement has fluctuated, it has overall improved  She states that she still has about the same amount of dizziness/limitation performing daily tasks, however she will not need to rest for as long afterwards anymore  Has been trying to practice tolerating lateral walking and sharp turning more often in her apartment  Continues to be limited in doing household chores and taking care of her cat due to dizziness and headaches  Objective: See treatment diary below     Patient-Specific Functional Scale   Task is scored 0 (unable to perform activity) to 10 (ability to perform activity independently)  Activity    8/25 10/18/22 11/8    1  Bending over to do laundry/ manage cat litter/ clean cat vomit  3-4/10  4/10 6-7/10 7/10 7/10   2  Turning while walking  7/10  7/10 8/10 9/10 9/10   3  Walking on uneven surfaces  8-9/10  8 5/10 9/10 9/10 9/10   4  Not feeling dizzy after engaged in activities  Currently, can last for 2-60 minutes after activity  Most likely to provoke symptoms is bending over, turn at speed or sharp angles, or with using exercise bike  Overall activity tolerance improving, able to perform most activities, but still has post activity dizziness  Post activity dizziness still limiting   6/10   5              DHI: 32     Gait speed  Self Selected:  1 0m/s  Fast Speed:  1 4m/s    Updated Goals:  Pt will demonstrate at least 3 point improvement on PSFS - progressing   Pt will be able to achieve gait speed of at least 0 9m/s - MET     Pt will demonstrate a least 4 point improvement on FGA - not met  Pt will demonstrate competency with HEP and plan for managing and further progressing tolerance to daily activities - progressing        Assessment: Since last re-eval Radha Nielsen has continue to make small but steady progress in managing her chronic dizziness and impaired gait  Today she demonstrates simililar gait speed, but is improving on dizziness handicap inventory score indicating reduced functional limitations related to dizziness  Although she continues to reports limitations and dizziness on PSFS, is also reporting reduced down time following activity  She continues to be greatly limited in ability to perform functional ambulation, household duties, or maintain employment  She would continue to benefit from skilled to further reduce these impairments and maximize her ability to perform functional tasks  Plan: Functional mobility, neuromuscular re-education, 1x/week for  6 weeks  Precautions: PTSD, hx of repeated concussions, high symptom irritability      HEP:  Walking for exercise, continue with current duration but include episodes of faster walking; optokinetics of driving / looking at road signs as a passenger, hip abd, squats, marching (back up against wall)    Functional Impairment to Address  12/6 12/16 12/20   2  Bending over to do laundry/ manage cat litter/ clean cat vomit  Take cone from variable heights, carry to high desk and place  6 x 2 sets in //bars    Standing bouncing ball (eyes on ball) 3min    Standing tossing ball up (eyes on ball) 3min   Take cone from chair, place on high or medium shelf  4x 6 sets  Take cone from doctor stool or step stool, and place on high shelf  5x 5sets    2  Turning while ambulating or varying direction  Walking with changing directions  Fwd/bwd/turns  No AD   10min Walking changing directions  -fwd/bwd 15ft no AD x4 laps  -Side stepping 15ft no AD x4 laps   3  Walking on uneven surfaces  Walking on mat outside bars  Bean bags under  5 laps  Walking on mat outside of bars  Turf patch under  6 laps    4  Improve overall activity tolerance / walking tolerance / strength Hip 3 way RTB (for HEP) 2x10 ea    Rows GTB 2x10    200ft with QC  200ft with SPC     Scifit L2 15min   Overground walking no AD 300ft     Scifit L3 15min Overground walking 100ft no AD    100ft x3 holding 7# pball    5  Education       6    Return to driving

## 2022-12-27 ENCOUNTER — APPOINTMENT (OUTPATIENT)
Dept: PHYSICAL THERAPY | Facility: REHABILITATION | Age: 37
End: 2022-12-27

## 2023-01-03 ENCOUNTER — OFFICE VISIT (OUTPATIENT)
Dept: FAMILY MEDICINE CLINIC | Facility: CLINIC | Age: 38
End: 2023-01-03

## 2023-01-03 ENCOUNTER — HOSPITAL ENCOUNTER (OUTPATIENT)
Dept: RADIOLOGY | Facility: HOSPITAL | Age: 38
Discharge: HOME | End: 2023-01-03

## 2023-01-03 VITALS
HEART RATE: 99 BPM | SYSTOLIC BLOOD PRESSURE: 122 MMHG | HEIGHT: 63 IN | OXYGEN SATURATION: 96 % | BODY MASS INDEX: 49.43 KG/M2 | WEIGHT: 279 LBS | DIASTOLIC BLOOD PRESSURE: 84 MMHG | TEMPERATURE: 97.1 F

## 2023-01-03 DIAGNOSIS — S99.919A TRAUMATIC INJURY OF ANKLE: ICD-10-CM

## 2023-01-03 DIAGNOSIS — E66.01 CLASS 3 SEVERE OBESITY DUE TO EXCESS CALORIES WITHOUT SERIOUS COMORBIDITY WITH BODY MASS INDEX (BMI) OF 45.0 TO 49.9 IN ADULT (HCC): ICD-10-CM

## 2023-01-03 DIAGNOSIS — G43.009 MIGRAINE WITHOUT AURA AND WITHOUT STATUS MIGRAINOSUS, NOT INTRACTABLE: ICD-10-CM

## 2023-01-03 DIAGNOSIS — Z23 NEED FOR INFLUENZA VACCINATION: Primary | ICD-10-CM

## 2023-01-03 NOTE — PROGRESS NOTES
Name: Donald Abbasi      : 1985      MRN: [id number]  Encounter Provider: Melida Ojeda MD  Encounter Date: 1/3/2023   Encounter department: [facility] PRIMARY CARE    Assessment & Plan     1  Need for influenza vaccination  -     influenza vaccine, quadrivalent, 0 5 mL, preservative-free, for adult and pediatric patients 6 mos+ (AFLURIA, FLUARIX, FLULAVAL, FLUZONE)    2  Traumatic injury of ankle  Assessment & Plan:  Patient did have significant fall, with then injury of the ankle, medially  There is point tenderness, swelling, some ecchymosis  Would recommend check x-ray to rule out fracture, though this is likely a sprain  It does sound like it is a fairly significant sprain in terms of pain, but there is definitely pain and discomfort, which is reasonable for her as that suggest that it is not a full torn ligament  If the x-rays are negative, could consider MRI if she has continued pain  Would also consider follow-up with podiatry or orthopedic foot and ankle  Orders:  -     XR ankle 3+ vw right; Future; Expected date: 2023    3  Migraine without aura and without status migrainosus, not intractable  Assessment & Plan:  Recommend follow-up with neurology  Patient is now using Topamax at slightly higher dosage  She was concerned that it was giving her some dizziness, and then she did have the ankle injury, though I am unsure if this was related  The description of her injury sounds like this was orthostasis secondary to getting up out of bed very quickly when she was concerned about her feline   Based on that, I would recommend that she call or send a message to neurology, but for now, continue on the Topamax  4  Class 3 severe obesity due to excess calories without serious comorbidity with body mass index (BMI) of 45 0 to 49 9 in adult [facility])  Assessment & Plan:  BMI continues to be elevated    No specific change other than trying to increase exercise within reason, especially given her new ankle injury  Patient has been in physical therapy up to this point  Limit carbohydrates  Subjective      Chief Complaint   Patient presents with   • Fall     Pt fell last week (12/27), pt states her left ankle is still a little sore and the left elbow as well  Pt feels the Topamax contributed to her fall due to having dizziness while taking it  mgb       Patient fell recently  Left ankle is sprained, bruised  No head injury with this  Was asleep, and Cat woke her up by screaming  She then went to stand quickly, and felt lightheadded and spinning, and fell  Left ankle and elbow hit  Fall was [id number]  Migraine headache: Patient is currently on a preventative medication, and felt that it may be causing some of her dizziness  She noted that when she increased the Topamax from the initial dosage, she did have a bit of dizziness, and looked at the product information sheet, and noted that that was a side effect  With that, she decreased the Topamax back to the baseline, 25 mg once a day  Dizziness at that point got better  She then increased to twice daily, and that is when the fall occurred  Review of Systems   Constitutional: Negative  HENT: Negative  Musculoskeletal:        Pain in the ankle, right   Neurological: Positive for dizziness  Current Outpatient Medications on File Prior to Visit   Medication Sig   • Multiple Vitamins-Minerals (ONE-A-DAY WOMENS PO) Take by mouth   • rizatriptan (MAXALT) 10 MG tablet Take 1 tablet (10 mg total) by mouth once as needed for migraine May repeat in 2 hours if needed  Max 2/24 hours, 9/month  • topiramate (TOPAMAX) 25 mg tablet 1 tab PO QHS for 1 week, increase as tolerated to 1 tab BID for 1 week, then 1 tab QAM and 2 tabs QHS for 1 week and finish at 2 tabs BID     • methylPREDNISolone 4 MG tablet therapy pack Use as directed on package (Patient not taking: Reported on 1/3/2023)       Objective /84 (BP Location: Right arm, Patient Position: Sitting, Cuff Size: Large)   Pulse 99   Temp (!) 97 1 °F (36 2 °C) (Temporal)   Ht 5' 3" (1 6 m)   Wt 127 kg (279 lb)   SpO2 96%   BMI 49 42 kg/m²     Physical Exam  Vitals and nursing note reviewed  Musculoskeletal:        Feet:    Feet:      Comments: Patient does have significant tenderness across the medial and anterior aspect of the right ankle  Lateral malleolus does show some significant swelling  There is point tenderness in multiple locations across the ankle on the right, more at the talus        Vidya Wheeler MD

## 2023-01-03 NOTE — ASSESSMENT & PLAN NOTE
Recommend follow-up with neurology  Patient is now using Topamax at slightly higher dosage  She was concerned that it was giving her some dizziness, and then she did have the ankle injury, though I am unsure if this was related  The description of her injury sounds like this was orthostasis secondary to getting up out of bed very quickly when she was concerned about her feline   Based on that, I would recommend that she call or send a message to neurology, but for now, continue on the Topamax

## 2023-01-03 NOTE — ASSESSMENT & PLAN NOTE
BMI continues to be elevated  No specific change other than trying to increase exercise within reason, especially given her new ankle injury  Patient has been in physical therapy up to this point  Limit carbohydrates

## 2023-01-03 NOTE — PATIENT INSTRUCTIONS
1  Need for influenza vaccination  -     influenza vaccine, quadrivalent, 0 5 mL, preservative-free, for adult and pediatric patients 6 mos+ (AFLURIA, FLUARIX, FLULAVAL, FLUZONE)    2  Traumatic injury of ankle  Assessment & Plan:  Patient did have significant fall, with then injury of the ankle, medially  There is point tenderness, swelling, some ecchymosis  Would recommend check x-ray to rule out fracture, though this is likely a sprain  It does sound like it is a fairly significant sprain in terms of pain, but there is definitely pain and discomfort, which is reasonable for her as that suggest that it is not a full torn ligament  If the x-rays are negative, could consider MRI if she has continued pain  Would also consider follow-up with podiatry or orthopedic foot and ankle  Orders:  -     XR ankle 3+ vw right; Future; Expected date: 01/03/2023    3  Migraine without aura and without status migrainosus, not intractable  Assessment & Plan:  Recommend follow-up with neurology  Patient is now using Topamax at slightly higher dosage  She was concerned that it was giving her some dizziness, and then she did have the ankle injury, though I am unsure if this was related  The description of her injury sounds like this was orthostasis secondary to getting up out of bed very quickly when she was concerned about her feline   Based on that, I would recommend that she call or send a message to neurology, but for now, continue on the Topamax  4  Class 3 severe obesity due to excess calories without serious comorbidity with body mass index (BMI) of 45 0 to 49 9 in adult [facility])  Assessment & Plan:  BMI continues to be elevated  No specific change other than trying to increase exercise within reason, especially given her new ankle injury  Patient has been in physical therapy up to this point  Limit carbohydrates              COVID 19 Instructions    Diann Lou was advised to limit contact with others to essential tasks such as getting food, medications, and medical care  Proper handwashing reviewed, and Hand sanitzer when washing is not available  If the patient develops symptoms of COVID 19, the patient should call the office as soon as possible  For 1347-7253 Flu season, it is strongly recommended that Flu Vaccinations be obtained  Virtual Visits:  Arsenio: This works on smart phones (any phone with Internet browsing capability)  You should get a text message when the provider is ready to see you  Click on the link in the text message, and the call should start  You will need to type in your name, and allow camera and microphone access  This is HIPPA compliant, and secure  If you have not already done so, get immunized to COVID 19  We are committed to getting you vaccinated as soon as possible and will be closely following CDC and [facility]  guidelines as they are released and revised  Please refer to our COVID-19 vaccine webpage for the most up to date information on the vaccine and our distribution efforts  This site will also have the most up to date recommendations for COVID booster vaccine  Chio rousseau    Call [phone]), option 7    OUR NEW LOCATION:    [address], [address] [place], [place] [address] , [place], [address]  Fax: [phone]    Lab services and OB/GYN are at this location as well

## 2023-01-03 NOTE — ASSESSMENT & PLAN NOTE
Patient did have significant fall, with then injury of the ankle, medially  There is point tenderness, swelling, some ecchymosis  Would recommend check x-ray to rule out fracture, though this is likely a sprain  It does sound like it is a fairly significant sprain in terms of pain, but there is definitely pain and discomfort, which is reasonable for her as that suggest that it is not a full torn ligament  If the x-rays are negative, could consider MRI if she has continued pain  Would also consider follow-up with podiatry or orthopedic foot and ankle

## 2023-01-05 ENCOUNTER — APPOINTMENT (OUTPATIENT)
Dept: PHYSICAL THERAPY | Facility: REHABILITATION | Age: 38
End: 2023-01-05

## 2023-01-09 ENCOUNTER — APPOINTMENT (OUTPATIENT)
Dept: PHYSICAL THERAPY | Facility: REHABILITATION | Age: 38
End: 2023-01-09

## 2023-01-10 ENCOUNTER — OFFICE VISIT (OUTPATIENT)
Dept: NEUROLOGY | Facility: CLINIC | Age: 38
End: 2023-01-10

## 2023-01-10 VITALS
HEART RATE: 77 BPM | WEIGHT: 279 LBS | TEMPERATURE: 97.3 F | DIASTOLIC BLOOD PRESSURE: 73 MMHG | SYSTOLIC BLOOD PRESSURE: 130 MMHG | BODY MASS INDEX: 49.43 KG/M2 | HEIGHT: 63 IN

## 2023-01-10 DIAGNOSIS — R06.83 SNORING: ICD-10-CM

## 2023-01-10 DIAGNOSIS — G43.009 MIGRAINE WITHOUT AURA AND WITHOUT STATUS MIGRAINOSUS, NOT INTRACTABLE: Primary | ICD-10-CM

## 2023-01-10 DIAGNOSIS — G47.00 INSOMNIA, UNSPECIFIED TYPE: ICD-10-CM

## 2023-01-10 DIAGNOSIS — F43.10 POST-TRAUMATIC STRESS: ICD-10-CM

## 2023-01-10 DIAGNOSIS — R29.818 NEUROLOGIC COMPLAINT, FUNCTIONAL: ICD-10-CM

## 2023-01-10 PROBLEM — S09.90XA HEAD INJURY: Status: RESOLVED | Noted: 2020-03-03 | Resolved: 2023-01-10

## 2023-01-10 PROBLEM — G44.329 CHRONIC POST-TRAUMATIC HEADACHE: Status: ACTIVE | Noted: 2022-04-04

## 2023-01-10 PROBLEM — Z87.820 HISTORY OF MULTIPLE CONCUSSIONS: Status: RESOLVED | Noted: 2022-03-21 | Resolved: 2023-01-10

## 2023-01-10 RX ORDER — IBUPROFEN 200 MG
400 TABLET ORAL EVERY 6 HOURS PRN
COMMUNITY

## 2023-01-10 NOTE — PATIENT INSTRUCTIONS
Establish care with counseling --call  I am placing a referral to the sleep medicine specialist team to further evaluate your sleep issues  Please call [phone] to schedule and I recommend you see one of the following providers:    Leobardo Mathur PA-C    Follow up with eye doctor for dilated eye exam     We dicussed gradual return to normalcy     Neurosymptoms  org - good website for functional neurologic disorder     Headache/migraine treatment:   Abortive medications (for immediate treatment of a headache): It is ok to take ibuprofen, acetaminophen or naproxen (Advil, Tylenol,  Aleve, Excedrin) if they help your headaches you should limit these to No more than 3 times a week to avoid medication overuse/rebound headaches  For your more moderate to severe migraines take this medication early   Maxalt (rizatriptan) 10mg tabs - take one at the onset of headache  May repeat one time after 1-2 hours if pain has not resolved  (Max 2 a day and 9 a month)       Prescription preventive medications for headaches/migraines   (to take every day to help prevent headaches - not to take at the time of headache):  [x] We will attempt to get your enrolled in the clinical trial for Aimovig  If you don't hear from the clinical trial team by next week please let me know  For now, until we get you on a different medication, increase your topamax to 50 mg at bedtime    *Typically these types of medications take time untill you see the benefit, although some may see improvement in days, often it may take weeks, especially if the medication is being titrated up to a beneficial level  Please contact us if there are any concerns or questions regarding the medication  Lifestyle Recommendations:  [x] SLEEP - Maintain a regular sleep schedule: Adults need at least 7-8 hours of uninterrupted a night   Maintain good sleep hygiene:  Going to bed and waking up at consistent times, avoiding excessive daytime naps, avoiding caffeinated beverages in the evening, avoid excessive stimulation in the evening and generally using bed primarily for sleeping  One hour before bedtime would recommend turning lights down lower, decreasing your activity (may read quietly, listen to music at a low volume)  When you get into bed, should eliminate all technology (no texting, emailing, playing with your phone, iPad or tablet in bed)  [x] HYDRATION - Maintain good hydration  Drink  2L of fluid a day (4 typical small water bottles)  [x] DIET - Maintain good nutrition  In particular don't skip meals and try and eat healthy balanced meals regularly  [x] TRIGGERS - Look for other triggers and avoid them: Limit caffeine to 1-2 cups a day or less  Avoid dietary triggers that you have noticed bring on your headaches (this could include aged cheese, peanuts, MSG, aspartame and nitrates)  [x] EXERCISE - physical exercise as we all know is good for you in many ways, and not only is good for your heart, but also is beneficial for your mental health, cognitive health and  chronic pain/headaches  I would encourage at the least 5 days of physical exercise weekly for at least 30 minutes  Education and Follow-up  [x] Please call with any questions or concerns  Of course if any new concerning symptoms go to the emergency department    [x] Follow up with Dr Uli Doyle in April 2023

## 2023-01-10 NOTE — PROGRESS NOTES
[facility] Consult  PATIENT:  Esdras Rubio  MRN:  [id number]  :  1985  DATE OF SERVICE:  1/10/2023  REFERRED BY: No ref  provider found  PMD: Andres Tran MD       Assessment/Plan:   Esdras Rubio is a very pleasant  44 y o  female with a past medical history that includes migraine, BMI over 45, vertigo, B12 deficiency, vitamin-D deficiency, insomnia, anxiety, depression, mild degenerative disc disease C4-5 referred here for evaluation of headache  Dr Sunny Oden initial evaluation 3/29/2022     Chronic daily headache  Migraine without aura and without status migrainosus, not intractable  Post-traumatic stress with some symptoms of functional neurologic disorder  History of possible concussion - resolved  We discussed her history of multiple possible concussions although we discussed it is also difficult to diagnose concussion definitively and how posttraumatic headache with migrainous features can often have similar presentation  Please see EMR and HPI for details  Most recently on 2022, she was seated on a couch when the back of the couch collapsed causing her to fall and hit the right side of her head on an end table she thinks, does not remember hitting her head, but had a red conchis at the right cheek area  She reports she does not remember a few seconds of time which we discussed can happen as a stress reaction are related to concussion and had progressively worsening up chronic vertigo and posttraumatic headache with migrainous features  She was evaluated emergency department where noncontrast head CT was unremarkable for acute pathology and she subsequently followed up with primary care provider who kept her out of work and asked her to follow up with Neurology    - As of 2022:  She reports following the injury vertigo actually got much better, she has chronic daily back on headaches for years dating back to at least 2020 that is worse about 1 day a week for which she is not interested in prescription preventative, did start trial of rizatriptan for abortive  She also has history of anxiety, depression and some symptoms of posttraumatic stress with functional neurologic disorder with hyper startle response, avoidance behavior and wearing sunglasses indoors, intrusion symptoms, negative impact on cognition and mood and arousal and reactivity changes  Also some symptoms of deconditioning and maladaptive coping  We discussed continue follow-up with PCP and recommended establishing care with a counselor for which I will have our  see if she can help her with a list   She was reassured by this information and we discussed gradual return to normalcy  Referral to sleep medicine for possible sleep apnea contributing to symptoms  - as of 5/6/2022: She reports she continues to have near daily headaches that are mild about 3/10 and worse about 2 times in the past month for which she did not yet try rizatriptan, however did go to ED today instead and sumatriptan plus IV fluids and Zofran helped  She is still working on following up with Sleep Medicine, eye doctor, counseling  She is now interested in prescription preventative and will start trial of amitriptyline with gradual titration up and again recommended taking rizatriptan next migraine   - as of 8/19/2022: She reports she is doing a lot better, continues to have daily headaches that are about 3/10, but reports improvement on amitriptyline 50 mg which she started end of June, about 6 weeks ago  None more than a 5/10 in 2 months, no longer having daily migraines, so hasnt needed rizatriptan  She is continuing to re-expose herself to TV, screens etc slowly  - as of 1/10/2023: currently pain is a 1/10  Had a good week between christmas and new years, then 3 days of pain 3-4/10  Maybe getting 3 mild headaches a week and getting more severe headaches 2-3 a month but can last more than 1 day      Patient was asleep and her cat started screaming in the living room and woke her up and got out of bed quickly and concerned  Did fall at that time and twisted her ankle  Had only gotten to 1 tab in the am and 2 at bedtime  Did decrease back to 25 mg at bedtime  Continues to have PT for dizziness      Workup:  - as of initial visit 03/29/2022 Neurologic assessment reveals normal neurological exam except for depressed mood and affect, tearful, sunglasses indoors, hyper startle response to light touch, allodynia bifrontal forehead  - noncontrast head CT 03/04/2020: No acute intracranial abnormality   - CTA head and neck with without contrast 11/02/2021: Normal CT angiogram of the head and neck  - MRI brain IAC with without contrast 2/9/22: No IAC or CP angle pathology   Unremarkable MRI of the brain  - noncontrast head CT 03/17/2022: No acute intracranial abnormality  - noncontrast head CT 05/06/2022: No acute intracranial abnormality  -CTA head and neck 05/06/2022: Negative CTA head and neck for large vessel occlusion, dissection, aneurysm, or high-grade stenosis      Preventative:  - we discussed headache hygiene and lifestyle factors that may improve headaches  - Increase Topamax 50 mg bedtime until change medications   Discussed proper use, possible side effects and risks  - We discussed adding CGRP medication but without insurance at this time would be too costly  Therefore, we will try to see if qualifies for the clinical trial or have  look into programs from the [employer] companies  - Past/ failed/contraindicated:  amitriptyline 50 mg nightly  - future options:  CGRP med     Abortive:  - discussed not taking over-the-counter or prescription pain medications more than 3 days per week to prevent medication overuse/rebound headache  -     rizatriptan (MAXALT) 10 MG tablet; Take 1 tablet (10 mg total) by mouth once as needed for migraine May repeat in 2 hours if needed  Max 2/24 hours, 9/month  Discussed proper use, possible side effects and risks  - Currently on through other providers:  Ibuprofen, meclizine  - Past/ failed/contraindicated:  OTC meds  - past:  Has tolerated steroids in the past, migraine cocktails have brought pain down, sumatriptan in ED helped with IV fluids and Zofran  - future options:  prochlorperazine, Toradol IM or p o , could consider trial for 5 days of Depakote or dexamethasone for prolonged migraine, ubrelvy, reyvow, nurtec     We have discussed concussions and the natural course of recovery  We have discussed that symptoms from a concussion typically take 1-2 weeks to resolve, and although sometimes it can feel like concussion symptoms linger on, at this point these symptoms would be more likely related to contributing factors      - Contributing factors may include:   Post traumatic stress, Prolonged removal from normal routine,  posttraumatic headache,  comorbid injuries, preexisting chronic headaches or migraines,  medication overuse headache, anxiety or depression, stress, deconditioning,  comorbid medical diagnoses  - I have recommended gradual return of normal cognitive and physical activity with safety precautions  - We discussed that newer research regarding concussion shows that the sooner one returns gradually to their normal physical and cognitive routine, the sooner one tends to recover   Prolonged removal from normal routine and deconditioning have been shown to prolong symptoms and worsen symptoms   - We discussed that sometimes there is a constellation of symptoms that some refer to as "post concussion syndrome," but I prefer not to use this term since that can be misleading and make people think they are still brain injured or "concussed," when the most common and likely etiology this far out from the head trauma is either contributing factors or a form of functional neurologic disorder with mixed symptoms  - We discussed how cognitive issues can have multiple causes and often related to multifactorial etiologies including stress, anxiety,  mood, pain, hypervigilance  and sleep issues and provided reassurance that, it is not likely the cognitive dysfunction is related to concussion at this point    - Safe driving precautions, should not drive at all if feeling sleepy or cognitively not well     * We discussed the most likely therapy to help in these cases would be counseling     Insomnia, snoring, concern for BUCKY -reordered today  -     Ambulatory referral to Sleep Medicine; Future     Patient instructions    Establish care with counseling --call  I am placing a referral to the sleep medicine specialist team to further evaluate your sleep issues  Please call [phone] to schedule and I recommend you see one of the following providers:    Pan Snow PA-C    Follow up with eye doctor for dilated eye exam     We dicussed gradual return to normalcy     Neurosymptoms  org - [employer] website for functional neurologic disorder     Headache/migraine treatment:   Abortive medications (for immediate treatment of a headache): It is ok to take ibuprofen, acetaminophen or naproxen (Advil, Tylenol,  Aleve, Excedrin) if they help your headaches you should limit these to No more than 3 times a week to avoid medication overuse/rebound headaches  For your more moderate to severe migraines take this medication early   Maxalt (rizatriptan) 10mg tabs - take one at the onset of headache  May repeat one time after 1-2 hours if pain has not resolved  (Max 2 a day and 9 a month)       Prescription preventive medications for headaches/migraines   (to take every day to help prevent headaches - not to take at the time of headache):  [x] We will attempt to get your enrolled in the clinical trial for [employer]  If you don't hear from the clinical trial team by next week please let me know  For now, until we get you on a different medication, increase your topamax to 50 mg at bedtime    *Typically these types of medications take time untill you see the benefit, although some may see improvement in days, often it may take weeks, especially if the medication is being titrated up to a beneficial level  Please contact us if there are any concerns or questions regarding the medication  Lifestyle Recommendations:  [x] SLEEP - Maintain a regular sleep schedule: Adults need at least 7-8 hours of uninterrupted a night  Maintain good sleep hygiene:  Going to bed and waking up at consistent times, avoiding excessive daytime naps, avoiding caffeinated beverages in the evening, avoid excessive stimulation in the evening and generally using bed primarily for sleeping  One hour before bedtime would recommend turning lights down lower, decreasing your activity (may read quietly, listen to music at a low volume)  When you get into bed, should eliminate all technology (no texting, emailing, playing with your phone, iPad or tablet in bed)  [x] HYDRATION - Maintain good hydration  Drink  2L of fluid a day (4 typical small water bottles)  [x] DIET - Maintain good nutrition  In particular don't skip meals and try and eat healthy balanced meals regularly  [x] TRIGGERS - Look for other triggers and avoid them: Limit caffeine to 1-2 cups a day or less  Avoid dietary triggers that you have noticed bring on your headaches (this could include aged cheese, peanuts, MSG, aspartame and nitrates)  [x] EXERCISE - physical exercise as we all know is good for you in many ways, and not only is good for your heart, but also is beneficial for your mental health, cognitive health and  chronic pain/headaches  I would encourage at the least 5 days of physical exercise weekly for at least 30 minutes  Education and Follow-up  [x] Please call with any questions or concerns   Of course if any new concerning symptoms go to the emergency department  [x] Follow up with Dr Sunny Oden in April 2023   CC: We had the pleasure of evaluating Esdras Rubio in neurological consultation today  Esdras Rubio is a   left  handed female who presents today for evaluation of headaches       History obtained from patient as well as available medical record review  History of Present Illness:   Interval history as of 1/10/2023  Is attempting to get insurance coverage but does not have any at this time    - has not gone to sleep medicine  - has not followed up with eye due to cost  - vertigo has improved with PT  No longer taking medication for this    Goes to bed at 1030 pm  Wakes up at 830 am  Wakes up multiple times 3-4+ a night  Sleep is not restful    - Continues to be out of work, no job, is going to start looking for one     Mild headaches last 20 minutes to a few days if she doesn't take her medication (ibuprofen), if she takes ibuprofen will last 20 minutes   Migraines last 3-4 hours with the rizatriptan    Only had to repeat 1 time in the past 2 months    Interval history as of 8/19/2022  - denies any new or concerning neurologic symptoms since last visit   - Saw NORTH Meyer 6/28/22   - Vertigo has improved and still goes to PT once a week, no longer takes meclizine as it was not helping  - Was referred to sleep medicine but she has no insurance currently and was unable to make an appointment with them due to cost  - Sleep is worse than previous, now sleeping 2 hours and then waking up, used to have more problems falling asleep but now the problem is staying asleep  - Continues to be out of work, no job   - Has not been able to follow up with eye doctor due to cost  - No ED visits since May  - Is working on exposure therapy every day but doesn't feel like she is making improvements currently and has been stuck trying to improve her time looking at screens, can only tolerate an hour of TV or 20 mins of computer     Headaches and migraines   - baseline headache is now a 3/10 from a 1/10 last time  - has not had a headache that is >5/10 in more than 2 months  Preventative: amitriptyline now up to 50 mg HS - just started it late June   Abortive: Maxalt (has not had to use it at all)  Denies bothersome side effects         Interval history as of 5/6/2022  - PCP is managing return to work, she is going back to work on Monday   -she had VNG which showed some sort of peripheral abnormality on the left contributing to her vertigo and is back with vestibular therapy  - she continues to struggle with deconditioning to lights and activities  -she has not yet followed up with counselor as recommended although  did offer list of counselors covered by her insurance, but has someone at PCP office that she is looking into   - She has not yet followed up with Sleep Medicine is recommended  - has not yet seen eye doctor for dilated eye exam, planning for next month      Headaches and migraines   She had she continues to have chronic near daily headaches that are typically mild at about 3/10, but only one significant migraine 6/10 yesterday where she took aleve and today was worse and went with ED      She went to the emergency department this morning due to migraine with diplopia, did not try rizatriptan and had improvement with sumatriptan a migraine cocktail with significant improvement in symptoms  She in fact saw Neurology while in the emergency department and CTA head and neck and CT head were performed      Has mostly not been taking any medications for headaches  Headache was worse yesterday and she had a naproxen in early afternoon  She has not been taking maxalt at home  Has been working on exposure therapy at home with light and screens and is only up to moderate tolerance       Preventative: nothing   Abortive: had not tried Rizatriptan yet        History as of our initial visit 03/29/2022: History of concussion  - 9th grade   States that she hit her head during indoor soccer and perhaps had a few seconds of LOC vs anmesia     - Most of 20s did not have medical insurance and had some hits to the head   - 2/29/2020 hit her head on her car   Didn't have LOC   Headaches, motion and light sensitivity started 20 minutes after  Next time lost balance   - 3/4/2020 when she was stepping out of her car, loss balance, did not hit head   Went to ER and then did therapy afterwards  [employer]id[employer] Acuna her job after this      - Patient started a job in August 2021 after being out of work for 1 5 years  Sunannia Jennifer feels like this was over stimulating for her  Demario Rodriguez felt noise and light were overwhelming for her      - November of 2021 patient presented to her family doctor with a severe headache that was unrelenting for 1 and half weeks   Patient had stabbing on the left side of her head    dizziness and felt unsteady   She was sent to the emergency room for evaluation via ambulance    CTA of the head and neck was done in the emergency room which was negative   - Since the ER, states that she developed dizziness 1 week before José Manuel out of nowhere    working from home since 12/27/2021 due to her dizziness     - Does wear her sunglasses frequently during the day indoors   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 the max time without the glasses was 1 hour and then needed the entire rest of the day   States at home uses less, however she did place them on 2 times during our visit  - She has followed with Sports Medicine, physical therapy, primary care, saw my neurology colleague NORTH Ward 02/02/2022  - went back to work after visit NORTH Ward, was working from home until episode 3/17/22     On 03/17/2022 she was seated on a couch when the back of the couch collapsed causing her to fall and hit the right side of her head on an end table she thinks, does not remember hitting her head, but had a red conchis at the right cheek area  Acute symptoms included:  Does not remember a few seconds of time, unknown if LOC as unwitnessed, progressively worsening vertigo/room spinning and generalized headache with photophobia and phonophobia and nausea     - as of 3/29/2022: she continues to have chronic photophobia, migraines, but vertigo after the incident actually has now been better than prior      Mood:   - anxiety and depression in the past, worse later due to medical conditions   - post traumatic stress symptoms - hypervigilance, impact on cognition and mood, lack of emotional filter  Things at work are bad, HR said if no emotional filter you should not come back   Never worked with a counselor   No SI     Work  8-4  M-F  Has not worked in some time        Headaches started at what age? 20 years old  How often do the headaches occur?   - as of 3/29/2022: chronic daily background headaches for years since at least 3/2020, worse when vertigo kicked in Dec 2021 and 1 day a week above 3/10  - as of 1/10/2023 3-4+ mild headaches a week with 2-3+ severe a month which can last more than 1 day     Aura? without aura      Last eye exam: eye sight improving, followed a long time ago, 7 years ago      Where is your headache located and pain quality?   - bifrontal pressure like an iron band across forehead is most common and radiates back across the top of the scalp until bioccipital   - ice pick left parietal   What is the intensity of pain? Average: 1/10, worst 7/10  Associated symptoms:   [x]? Nausea       []? Vomiting      [x]? Photophobia     [x]? Phonophobia      []? Osmophobia  []? Blurred vision   [x]? Light-headed or dizzy - chronic     []? Tinnitus - not for prolonged periods and not severe  []? Hands or feet tingle or feel numb/paresthesias    []? Ptosis      []? Facial droop  []? Lacrimation  []? Nasal congestion/rhinorrhea          Things that make the headache worse?  No specific movements, sometimes bending over     Headache triggers:  Lights, can be worse in am, stress, weather changes, unsure if hormonal      Have you seen someone else for headaches or pain? Yes many providers   Are you current pregnant or planning on getting pregnant? No plans, not active   Have you ever had any Brain imaging? yes     What medications do you take or have you taken for your headaches? ABORTIVE:       Ibuprofen dulls it  Meclizine     Past  Something as needed in the past   Steroids in the past   Migraine cocktails in ED have helped bring pain down      PREVENTIVE:   -     MVI      Past/ failed/contraindicated:   -        LIFESTYLE  Sleep   - averages: 6-8 hours, snores, never had a sleep study  Problems falling asleep?:   Yes  Problems staying asleep?:  Yes, 1-4 for unknown reasons        Water: 6 cups per day  Caffeine: usually 0-1 soda per day        The following portions of the patient's history were reviewed and updated as appropriate: allergies, current medications, past family history, past medical history, past social history, past surgical history and problem list      Pertinent family history:  Family history of headaches:  no known family members with significant headaches  Any family history of aneurysms - No     Pertinent social history:  Work: community , 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  Lives with cat      Past Medical History:     Past Medical History:   Diagnosis Date   • Concussion    • Edema of left lower extremity 7/12/2021   • TBI (traumatic brain injury)     [id number] stated by patient       Patient Active Problem List   Diagnosis   • Obesity   • Headache   • Vertigo   • Worsening headaches   • B12 deficiency   • Vitamin D deficiency   • Snoring   • Post-traumatic stress   • Chronic post-traumatic headache   • Migraine   • 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   • Neurologic complaint, functional   • Traumatic injury of ankle   • Insomnia       Medications:      Current Outpatient Medications   Medication Sig Dispense Refill   • ibuprofen (MOTRIN) 200 mg tablet Take 400 mg by mouth every 6 (six) hours as needed for mild pain     • Multiple Vitamins-Minerals (ONE-A-DAY WOMENS PO) Take 1 tablet by mouth in the morning     • rizatriptan (MAXALT) 10 MG tablet Take 1 tablet (10 mg total) by mouth once as needed for migraine May repeat in 2 hours if needed  Max 2/24 hours, 9/month  9 tablet 6   • topiramate (TOPAMAX) 25 mg tablet 1 tab PO QHS for 1 week, increase as tolerated to 1 tab BID for 1 week, then 1 tab QAM and 2 tabs QHS for 1 week and finish at 2 tabs BID  (Patient taking differently: Take 25 mg by mouth daily at bedtime 1 tab PO QHS for 1 week, increase as tolerated to 1 tab BID for 1 week, then 1 tab QAM and 2 tabs QHS for 1 week and finish at 2 tabs BID ) 120 tablet 4   • methylPREDNISolone 4 MG tablet therapy pack Use as directed on package (Patient not taking: Reported on 1/3/2023) 21 each 0     No current facility-administered medications for this visit          Allergies:    No Known Allergies    Family History:     Family History   Problem Relation Age of Onset   • Cancer Mother    • Heart disease Father        Social History:       Social History     Socioeconomic History   • Marital status: Single     Spouse name: Not on file   • Number of children: Not on file   • Years of education: Not on file   • Highest education level: Not on file   Occupational History   • Not on file   Tobacco Use   • Smoking status: Never   • Smokeless tobacco: Never   Vaping Use   • Vaping Use: Never used   Substance and Sexual Activity   • Alcohol use: Not Currently   • Drug use: Never   • Sexual activity: Not on file   Other Topics Concern   • Not on file   Social History Narrative    CONSUMES 1 SODA PER DAY     Social Determinants of Health     Financial Resource Strain: Not on file   Food Insecurity: Not on file   Transportation Needs: Not on file   Physical Activity: Not on file   Stress: Not on file   Social Connections: Not on file   Intimate Partner Violence: Not on file   Housing Stability: Not on file      I have reviewed the patient's medical, social and surgical history as well as medications in detail and updated the computerized patient record  Objective:     Vitals:    01/10/23 0807   BP: 130/73   BP Location: Left arm   Patient Position: Sitting   Cuff Size: Large   Pulse: 77   Temp: (!) 97 3 °F (36 3 °C)   TempSrc: Temporal   Weight: 127 kg (279 lb)   Height: 5' 3" (1 6 m)       CONSTITUTIONAL: Well developed, well nourished, well groomed  No dysmorphic features  Eyes:  EOM normal      Neck:  Normal ROM, neck supple  HEENT:  Normocephalic atraumatic  Chest:  Respirations regular and unlabored  Psychiatric:  Normal behavior and appropriate affect      MENTAL STATUS  Orientation: Alert and oriented x 3  Fund of knowledge: Intact  COORDINATION   Station/Gait: slow wide based gait with cane  Pertinent lab results:   5/6/2022 CMP and CBC unremarkable   TSH normal   EKG 05/06/2022 sinus rhythm with sinus arrhythmia, normal EKG, rate 74,    EKG 5/15/2014, normal sinus rhythm, rate 80,    Imaging: I have personally reviewed imaging and radiology read   - noncontrast head CT 03/04/2020: No acute intracranial abnormality    - CTA head and neck with without contrast 11/02/2021: Normal CT angiogram of the head and neck  - MRI brain IAC with without contrast 2/9/22: No IAC or CP angle pathology  Unremarkable MRI of the brain  - noncontrast head CT 03/17/2022: No acute intracranial abnormality  Imaging:    I have personally reviewed imaging and radiology read   - noncontrast head CT 03/04/2020: No acute intracranial abnormality   - CTA head and neck with without contrast 11/02/2021: Normal CT angiogram of the head and neck  - MRI brain IAC with without contrast 2/9/22: No IAC or CP angle pathology   Unremarkable MRI of the brain  - noncontrast head CT 03/17/2022: No acute intracranial abnormality             Review of Systems:     Constitutional: Negative      HENT: Negative  Eyes: Negative  Respiratory: Negative  Cardiovascular: Negative  Gastrointestinal: Negative  Endocrine: Negative  Genitourinary: Negative  Musculoskeletal: Negative  Skin: Negative  Allergic/Immunologic: Negative  Neurological: Positive for  headaches  Hematological: Negative  Psychiatric/Behavioral: Negative     I personally reviewed and updated the ROS that was entered by the medical assistant         I have spent 45 minutes in total time for this visit    Author:  Montse Hughes PA-C 1/10/2023 1:31 PM

## 2023-01-16 ENCOUNTER — TELEPHONE (OUTPATIENT)
Dept: OTHER | Facility: HOSPITAL | Age: 38
End: 2023-01-16

## 2023-01-16 ENCOUNTER — OFFICE VISIT (OUTPATIENT)
Dept: PHYSICAL THERAPY | Facility: REHABILITATION | Age: 38
End: 2023-01-16

## 2023-01-16 DIAGNOSIS — F43.10 POST-TRAUMATIC STRESS: ICD-10-CM

## 2023-01-16 DIAGNOSIS — S99.919A TRAUMATIC INJURY OF ANKLE: Primary | ICD-10-CM

## 2023-01-16 DIAGNOSIS — G44.319 ACUTE POST-TRAUMATIC HEADACHE, NOT INTRACTABLE: ICD-10-CM

## 2023-01-16 DIAGNOSIS — S09.90XD INJURY OF HEAD, SUBSEQUENT ENCOUNTER: ICD-10-CM

## 2023-01-16 NOTE — PROGRESS NOTES
Re-Eval     Today's date: 2023  Patient name: Geoff Bond  : 1985  MRN: [id number]  Referring provider: Matty Silva MD  Dx:   Encounter Diagnosis     ICD-10-CM    1  Traumatic injury of ankle  S99 919A       2  Injury of head, subsequent encounter  S09  90XD       3  Acute post-traumatic headache, not intractable  G44 319       4  Post-traumatic stress  F43 10            Subjective: On  pt reports she was woken up in the middle of the night by her cat and got out of bed quickly  She moved too quickly and twisted her R ankle falling to the ground  She has been experiencing pain and swelling since  Saw her PCP and had an x-ray done which was negative  Sprain was suspected by her PCP  Her pain and swelling has been steadily improving  She is gradually trying to walk more and has been icing and doing ankle circles/pumps to keep her ankle moving  Objective: See treatment diary below       Tenderness to palpaiton at dorsal foot/ankle and medial ankle      Ankle Range of Motion Right  Left    Dorsi flex 0 pain ant ankle     Plantar flex 56    Inversion 25 pain medial ankle    Eversion 10 pain medial ankle           Ankle MMT Right  Left   Dorsi Flex 3+    Plantar flex 4               Gait Observations: mildly antalgic on R         Assessment: Nevin oV had previously presented consistently to PT for chronic dizziness and gait dysfunction secondary to post-concussion syndrome  Due to recent R ankle injury however, she is limited in her ability to participate in in previous POC, there for R ankle was evaluated today in PT  She demonstrates swelling, limited AROM, weakness on MMT, and tenderness to palpation at R ankle  These findings are consistent with findings at PCP and seen on x-ray  Deltoid ligament sprain suspected in addition to possible strain of posterior tibial tendon   These impairments are resulting in difficulty walking, standing, and participating in exercises to treat her dizziness and gait dysfunction  She would benefit from skilled PT to address her ankle deficits in order to safely and efficiently return her to prior POC in PT  Updated Goals  STGs  -Pt will be competent in comprehensive HEP for ankle  -Pt will report reduced pain no greater than 2/10 at worst    LTGs  -Pt will demonstrate improved ankle DF by at least 10 degrees  -Pt will be able to perform walking around her block and 30-45min of gait/balance training w/o limitation due to pain    Plan: Restore ankle mobility and strength, reduce pain in order to return to treatment for dizziness and gait dysfunction     Precautions: PTSD, hx of repeated concussions, high symptom irritability      HEP:  Walking for exercise, continue with current duration but include episodes of faster walking; optokinetics of driving / looking at road signs as a passenger, hip abd, squats, marching (back up against wall)    Functional Impairment to Address  12/16 12/20 1/16   1  Bending over to do laundry/ manage cat litter/ clean cat vomit  Take cone from chair, place on high or medium shelf  4x 6 sets  Take cone from doctor stool or step stool, and place on high shelf  5x 5sets     2  Turning while ambulating or varying direction  Walking with changing directions  Fwd/bwd/turns  No AD  10min Walking changing directions  -fwd/bwd 15ft no AD x4 laps  -Side stepping 15ft no AD x4 laps    3  Walking on uneven surfaces  Walking on mat outside bars  Bean bags under  5 laps  Walking on mat outside of bars  Turf patch under  6 laps     4  Improve overall activity tolerance / walking tolerance / strength Overground walking no AD 300ft     Scifit L3 15min Overground walking 100ft no AD    100ft x3 holding 7# pball     5  Education       6    Return to driving      R ankle injury    Gastroc stretch    Ankle DF slide     Heel raise seated    Ankle pumps/circles with elevation

## 2023-01-25 ENCOUNTER — APPOINTMENT (OUTPATIENT)
Dept: PHYSICAL THERAPY | Facility: REHABILITATION | Age: 38
End: 2023-01-25

## 2023-01-26 ENCOUNTER — OFFICE VISIT (OUTPATIENT)
Dept: PHYSICAL THERAPY | Facility: REHABILITATION | Age: 38
End: 2023-01-26

## 2023-01-26 DIAGNOSIS — S99.919A TRAUMATIC INJURY OF ANKLE: Primary | ICD-10-CM

## 2023-01-26 DIAGNOSIS — S09.90XD INJURY OF HEAD, SUBSEQUENT ENCOUNTER: ICD-10-CM

## 2023-01-26 DIAGNOSIS — G44.319 ACUTE POST-TRAUMATIC HEADACHE, NOT INTRACTABLE: ICD-10-CM

## 2023-01-26 DIAGNOSIS — F43.10 POST-TRAUMATIC STRESS: ICD-10-CM

## 2023-01-26 NOTE — PROGRESS NOTES
Daily Note     Today's date: 2023  Patient name: Maylin Pack  : 1985  MRN: [id number]  Referring provider: Rema Litten, MD  Dx:   Encounter Diagnosis     ICD-10-CM    1  Traumatic injury of ankle  S99 919A       2  Injury of head, subsequent encounter  S09  90XD       3  Acute post-traumatic headache, not intractable  G44 319       4  Post-traumatic stress  F43 10            Subjective: Ankle pain and swelling has improved  Still having some pain with ankle dorsiflexion  Objective: See treatment diary below       Tenderness to palpaiton at dorsal foot/ankle and medial ankle      Ankle Range of Motion Right  Left    Dorsi flex 0 pain ant ankle     Plantar flex 56    Inversion 25 pain medial ankle    Eversion 10 pain medial ankle           Ankle MMT Right  Left   Dorsi Flex 3+    Plantar flex 4               Gait Observations: mildly antalgic on R         Assessment: Tolerated treatment well  Progressed with resisted ankle strengthening today which was tolerated well  After was able to perform some dynamic balance and function activity training  Demonstrated improved confience with reaching to high shelf today with less instability and hesitation  Improved stability with practice standing on compliant surface  Chose not to perform scifit at end of visit due to increasing ankle pain        Updated Goals  STGs  -Pt will be competent in comprehensive HEP for ankle  -Pt will report reduced pain no greater than 2/10 at worst    LTGs  -Pt will demonstrate improved ankle DF by at least 10 degrees  -Pt will be able to perform walking around her block and 30-45min of gait/balance training w/o limitation due to pain    Plan: Restore ankle mobility and strength, reduce pain in order to return to treatment for dizziness and gait dysfunction     Precautions: PTSD, hx of repeated concussions, high symptom irritability      HEP:  Walking for exercise, continue with current duration but include episodes of faster walking; optokinetics of driving / looking at road signs as a passenger, hip abd, squats, marching (back up against wall)    Functional Impairment to Address  12/16 12/20 1/16 1/26   1  Bending over to do laundry/ manage cat litter/ clean cat vomit  Take cone from chair, place on high or medium shelf  4x 6 sets  Take cone from doctor stool or step stool, and place on high shelf  5x 5sets   Take cone from doctor stool , and place on high/medium shelf  5x 5sets    2  Turning while ambulating or varying direction  Walking with changing directions  Fwd/bwd/turns  No AD  10min Walking changing directions  -fwd/bwd 15ft no AD x4 laps  -Side stepping 15ft no AD x4 laps  Wlking changing direcitons  -fwd/bwd 15ft with QC x4 laps  -Side stepping 15ft with QC x4 laps    3  Walking on uneven surfaces  Walking on mat outside bars  Bean bags under  5 laps  Walking on mat outside of bars  Turf patch under  6 laps   FT stance on folded mat 3min    4  Improve overall activity tolerance / walking tolerance / strength Overground walking no AD 300ft     Scifit L3 15min Overground walking 100ft no AD    100ft x3 holding 7# pball      5  Education        6  Return to driving       R ankle injury    Gastroc stretch    Ankle DF slide     Heel raise seated    Ankle pumps/circles with elevation  Gastroc stretch 30"x2    Ankle 4 way   YTB 2x10 ea

## 2023-01-30 ENCOUNTER — APPOINTMENT (OUTPATIENT)
Dept: PHYSICAL THERAPY | Facility: REHABILITATION | Age: 38
End: 2023-01-30

## 2023-02-01 ENCOUNTER — OFFICE VISIT (OUTPATIENT)
Dept: PHYSICAL THERAPY | Facility: REHABILITATION | Age: 38
End: 2023-02-01

## 2023-02-01 DIAGNOSIS — F43.10 POST-TRAUMATIC STRESS: ICD-10-CM

## 2023-02-01 DIAGNOSIS — S99.919A TRAUMATIC INJURY OF ANKLE: Primary | ICD-10-CM

## 2023-02-01 DIAGNOSIS — G44.319 ACUTE POST-TRAUMATIC HEADACHE, NOT INTRACTABLE: ICD-10-CM

## 2023-02-01 DIAGNOSIS — S09.90XD INJURY OF HEAD, SUBSEQUENT ENCOUNTER: ICD-10-CM

## 2023-02-01 NOTE — PROGRESS NOTES
Daily Note     Today's date: 2023  Patient name: Alejandro Montesinos  : 1985  MRN: [id number]  Referring provider: Esteban Rao MD  Dx:   Encounter Diagnosis     ICD-10-CM    1  Traumatic injury of ankle  S99 919A       2  Injury of head, subsequent encounter  S09  90XD       3  Acute post-traumatic headache, not intractable  G44 319       4  Post-traumatic stress  F43 10            Subjective: Reports R ankle is still sore and more swollen following exercises  She was discouraged because over the weekend she had a very bad day with dizziness and a migraine on Monday  Since that bad day she has been improving though  Objective: See treatment diary below         Assessment: Tolerated treatment well  Some increase in reactive extension and some decrease in overall movement speed, but otherwise did well considering not feeling well past few days  Cued to maintain visual fixation at times to improve stability and to utilize distraction or relaxation to improve tolerance to task  Level of difficulty and tolerance closely monitored to allow for appropriate challenge  Would benefit from continued PT  Plan: Restore ankle mobility and strength, reduce pain in order to return to treatment for dizziness and gait dysfunction     Precautions: PTSD, hx of repeated concussions, high symptom irritability      HEP:  Walking for exercise, continue with current duration but include episodes of faster walking; optokinetics of driving / looking at road signs as a passenger, hip abd, squats, marching (back up against wall), ankle gastroc stretch, seated ankle DF stretch,     Functional Impairment to Address     1  Bending over to do laundry/ manage cat litter/ clean cat vomit  Take cone from doctor stool or step stool, and place on high shelf  5x 5sets   Take cone from doctor stool , and place on high/medium shelf  5x 5sets     2  Turning while ambulating or varying direction    Walking changing directions  -fwd/bwd 15ft no AD x4 laps  -Side stepping 15ft no AD x4 laps  Wlking changing direcitons  -fwd/bwd 15ft with QC x4 laps  -Side stepping 15ft with QC x4 laps  Walk, turn, catch ball  In //bars  x6    -Side stepping 15ft x8, QC  -fwd/bwd walking 15 x4, QC     3  Walking on uneven surfaces  Walking on mat outside of bars  Turf patch under  6 laps   FT stance on folded mat 3min  Walking across mat x2 laps    x1 lap holding 7# ball    4  Improve overall activity tolerance / walking tolerance / strength Overground walking 100ft no AD    100ft x3 holding 7# pball    STS holding 7# ball (instability upon standing) challenged to move as fast as possible  2x10    Overground walking 200ft with QC  100ft no AD    Scifit L1  15min    5  Education        6  Return to driving       R ankle injury   Gastroc stretch    Ankle DF slide     Heel raise seated    Ankle pumps/circles with elevation  Gastroc stretch 30"x2    Ankle 4 way   YTB 2x10 ea       Reviewed HEP    Seated ankle DF stretch 30"x2

## 2023-02-08 ENCOUNTER — OFFICE VISIT (OUTPATIENT)
Dept: PHYSICAL THERAPY | Facility: REHABILITATION | Age: 38
End: 2023-02-08

## 2023-02-08 DIAGNOSIS — G44.319 ACUTE POST-TRAUMATIC HEADACHE, NOT INTRACTABLE: ICD-10-CM

## 2023-02-08 DIAGNOSIS — F43.10 POST-TRAUMATIC STRESS: ICD-10-CM

## 2023-02-08 DIAGNOSIS — S09.90XD INJURY OF HEAD, SUBSEQUENT ENCOUNTER: ICD-10-CM

## 2023-02-08 DIAGNOSIS — S99.919A TRAUMATIC INJURY OF ANKLE: Primary | ICD-10-CM

## 2023-02-08 NOTE — PROGRESS NOTES
Daily Note     Today's date: 2023  Patient name: Modesta Mcclelland  : 1985  MRN: [id number]  Referring provider: Marva Hawkins MD  Dx:   Encounter Diagnosis     ICD-10-CM    1  Traumatic injury of ankle  S99 919A       2  Injury of head, subsequent encounter  S09  90XD       3  Acute post-traumatic headache, not intractable  G44 319       4  Post-traumatic stress  F43 10            Subjective: Reports that last week she started experiencing swelling of both LE's  This calmed down then returned a few days later  Slight swelling RLE noted today  Has not had this issue before  Dizziness has calmed down to baseline  Ankle is slowly improving with pain little by little  Still doing HEP, but held off on exercises the days her legs were swollen  Started using floor bike at home with good success  Objective: See treatment diary below         Assessment: Tolerated treatment well  Pt tolerating exercises well today demonstrating improved gait quality and reduced instability / dizziness  Progressed to bending fwd to lower surfaces and encouraging reduced UE support  Pt able to increase gait speed with self cueing from timer, but was more challenge with stopping after quick walking  Recommended increasing gait speed during walks at home and reducing UE support during practice with bending fwd as tolerated  Would benefit from continued PT  Recommended that if swelling does not improve or worsens again to consult with her PCP immediately         Plan: Restore ankle mobility and strength, reduce pain in order to return to treatment for dizziness and gait dysfunction     Precautions: PTSD, hx of repeated concussions, high symptom irritability      HEP:  Walking for exercise, continue with current duration but include episodes of faster walking; optokinetics of driving / looking at road signs as a passenger, hip abd, squats, marching, rows, (back up against wall), ankle gastroc stretch, seated ankle DF stretch, Functional Impairment to Address  1/16 1/26 2/1 2/8   1  Bending over to do laundry/ manage cat litter/ clean cat vomit  Take cone from doctor stool , and place on high/medium shelf  5x 5sets   Take cone from doctor stool or step stool, place on high/medium shelf 5x5 sets  Progressing to reducing any UE support  2   Turning while ambulating or varying direction  Wlking changing direcitons  -fwd/bwd 15ft with QC x4 laps  -Side stepping 15ft with QC x4 laps  Walk, turn, catch ball  In //bars  x6    -Side stepping 15ft x8, QC  -fwd/bwd walking 15 x4, QC   Quick stops 200ft with AD     3    Walking on uneven surfaces  FT stance on folded mat 3min  Walking across mat x2 laps    x1 lap holding 7# ball     4  Improve overall activity tolerance / walking tolerance / strength   STS holding 7# ball (instability upon standing) challenged to move as fast as possible  2x10    Overground walking 200ft with QC  100ft no AD    Scifit L1  15min  Speed walking over 24ft x10, progressing to no AD    Best: 7 5s    Scifit L4 15min    5  Education        6  Return to driving       R ankle injury  Gastroc stretch    Ankle DF slide     Heel raise seated    Ankle pumps/circles with elevation  Gastroc stretch 30"x2    Ankle 4 way   YTB 2x10 ea       Reviewed HEP    Seated ankle DF stretch 30"x2

## 2023-02-14 ENCOUNTER — DOCUMENTATION (OUTPATIENT)
Dept: OTHER | Facility: HOSPITAL | Age: 38
End: 2023-02-14

## 2023-02-14 NOTE — PROGRESS NOTES
Patient seen for [place] [address] migraine study  Patient signed consent after reviewing with the study coordiantor  Patient has no questions at this time  Copy of consent provided to the patient

## 2023-02-15 ENCOUNTER — OFFICE VISIT (OUTPATIENT)
Dept: PHYSICAL THERAPY | Facility: REHABILITATION | Age: 38
End: 2023-02-15

## 2023-02-15 DIAGNOSIS — G44.319 ACUTE POST-TRAUMATIC HEADACHE, NOT INTRACTABLE: ICD-10-CM

## 2023-02-15 DIAGNOSIS — S09.90XD INJURY OF HEAD, SUBSEQUENT ENCOUNTER: ICD-10-CM

## 2023-02-15 DIAGNOSIS — F43.10 POST-TRAUMATIC STRESS: ICD-10-CM

## 2023-02-15 DIAGNOSIS — S99.919A TRAUMATIC INJURY OF ANKLE: Primary | ICD-10-CM

## 2023-02-15 NOTE — PROGRESS NOTES
Daily Note     Today's date: 2/15/2023  Patient name: Deborah Woody  : 1985  MRN: [id number]  Referring provider: Jojo Cisneros MD  Dx:   Encounter Diagnosis     ICD-10-CM    1  Traumatic injury of ankle  S99 919A       2  Injury of head, subsequent encounter  S09  90XD       3  Acute post-traumatic headache, not intractable  G44 319       4  Post-traumatic stress  F43 10            Subjective: Last week has been good regarding dizziness and balance  Has not had the swelling issue in her legs  Only noticing some mild to moderate swelling still on her R ankle  Finds that if she moves more the swelling improves so has been more active, this does make it a little more sore though  Objective: See treatment diary below         Assessment: Tolerated treatment well  Swelling and tenderness (although improved) persists on medial ankle  Possible posterior tibial tendon involvement  Responded well to ankle mobilizations with band with improved mobility and pain  Updated HEP today  Progressed to reaching for objects from floor, which pt was challenged with demonstrating some postural sway and instability  Stand rest breaks needed through out to moderate dizziness with changing directions  Would benefit from continued PT  Plan: Restore ankle mobility and strength, reduce pain in order to return to treatment for dizziness and gait dysfunction     Precautions: PTSD, hx of repeated concussions, high symptom irritability      HEP:  Walking for exercise, continue with current duration but include episodes of faster walking; optokinetics of driving / looking at road signs as a passenger, hip abd, squats, marching, rows, (back up against wall), ankle gastroc stretch, seated ankle DF stretch, self ankle mob    Functional Impairment to Address  1/26 2/1 2/8 2/15   1  Bending over to do laundry/ manage cat litter/ clean cat vomit  Take cone from doctor stool , and place on high/medium shelf   5x 5sets   Take cone from doctor stool or step stool, place on high/medium shelf 5x5 sets  Progressing to reducing any UE support  Take cone from doctor stool or step stool, place on high/medium shelf 5x2 sets  Progressed to cones on floor with UE as needed support [id number]   2  Turning while ambulating or varying direction  Wlking changing direcitons  -fwd/bwd 15ft with QC x4 laps  -Side stepping 15ft with QC x4 laps  Walk, turn, catch ball  In //bars  x6    -Side stepping 15ft x8, QC  -fwd/bwd walking 15 x4, QC   Quick stops 200ft with AD  Quick stops 200ft with AD  Side stepping 15ft x8    Fwd/bwd walking 15ft x6    Speed walk no AD 60ft     3    Walking on uneven surfaces  FT stance on folded mat 3min  Walking across mat x2 laps    x1 lap holding 7# ball      4  Improve overall activity tolerance / walking tolerance / strength  STS holding 7# ball (instability upon standing) challenged to move as fast as possible  2x10    Overground walking 200ft with QC  100ft no AD    Scifit L1  15min  Speed walking over 24ft x10, progressing to no AD    Best: 7 5s    Scifit L4 15min  Scifit L2 5 10min     Self ankle DF mob 10x 10"       5  Education        6  Return to driving       R ankle injury  Gastroc stretch 30"x2    Ankle 4 way   YTB 2x10 ea       Reviewed HEP    Seated ankle DF stretch 30"x2

## 2023-02-23 ENCOUNTER — APPOINTMENT (OUTPATIENT)
Dept: PHYSICAL THERAPY | Facility: REHABILITATION | Age: 38
End: 2023-02-23

## 2023-02-24 ENCOUNTER — OFFICE VISIT (OUTPATIENT)
Dept: PHYSICAL THERAPY | Facility: REHABILITATION | Age: 38
End: 2023-02-24

## 2023-02-24 DIAGNOSIS — S09.90XD INJURY OF HEAD, SUBSEQUENT ENCOUNTER: ICD-10-CM

## 2023-02-24 DIAGNOSIS — G44.319 ACUTE POST-TRAUMATIC HEADACHE, NOT INTRACTABLE: ICD-10-CM

## 2023-02-24 DIAGNOSIS — S99.919A TRAUMATIC INJURY OF ANKLE: Primary | ICD-10-CM

## 2023-02-24 DIAGNOSIS — F43.10 POST-TRAUMATIC STRESS: ICD-10-CM

## 2023-02-24 NOTE — PROGRESS NOTES
Daily Note     Today's date: 2023  Patient name: Edgar Alvarado  : 1985  MRN: [id number]  Referring provider: Bridger Gonsalez MD  Dx:   Encounter Diagnosis     ICD-10-CM    1  Traumatic injury of ankle  S99 919A       2  Injury of head, subsequent encounter  S09  90XD       3  Acute post-traumatic headache, not intractable  G44 319       4  Post-traumatic stress  F43 10            Subjective: R ankle is considerably improved minimal to no pain and swelling seems to have gone away  Had a bad migraine yesterday, still feeling the effects today  Was feeling accomplished that she was able to luigi after a ball that was rolling down the road for her nephew  Objective: See treatment diary below       Assessment: Tolerated treatment well  No pain reported with walking today in R ankle  Increased dizziness noted today with bending over and placing cones on high surface needing standing rest breaks  Besides this tolerated exercises similar to last visit despite increased headache  Challenged with quick turns and tapping to blaze pods demonstrating postural sway and protective extension  Modulating exercise difficulty with blaze pods by cueing patient speed  Would benefit from continued PT  Plan: Restore ankle mobility and strength, reduce pain in order to return to treatment for dizziness and gait dysfunction     Precautions: PTSD, hx of repeated concussions, high symptom irritability      HEP:  Walking for exercise, continue with current duration but include episodes of faster walking; optokinetics of driving / looking at road signs as a passenger, hip abd, squats, marching, rows, (back up against wall), ankle gastroc stretch, seated ankle DF stretch, self ankle mob    Functional Impairment to Address  1/26 2/1 2/8 2/15 2/24   1  Bending over to do laundry/ manage cat litter/ clean cat vomit  Take cone from doctor stool , and place on high/medium shelf   5x 5sets   Take cone from doctor stool or step stool, place on high/medium shelf 5x5 sets  Progressing to reducing any UE support  Take cone from doctor stool or step stool, place on high/medium shelf 5x2 sets  Progressed to cones on floor with UE as needed support [id number] Take cone from doctor stool or step stool, place on high/medium shelf 5x2 sets  2   Turning while ambulating or varying direction  Wlking changing direcitons  -fwd/bwd 15ft with QC x4 laps  -Side stepping 15ft with QC x4 laps  Walk, turn, catch ball  In //bars  x6    -Side stepping 15ft x8, QC  -fwd/bwd walking 15 x4, QC   Quick stops 200ft with AD  Quick stops 200ft with AD  Side stepping 15ft x8    Fwd/bwd walking 15ft x6    Speed walk no AD 60ft   Blaze pods 1min rounds x3, intermittent use of AD  Side stepping 15ft x4 no AD    Speed walking 30ft straight path, no AD  x8 timed    3  Walking on uneven surfaces  FT stance on folded mat 3min  Walking across mat x2 laps    x1 lap holding 7# ball       4  Improve overall activity tolerance / walking tolerance / strength  STS holding 7# ball (instability upon standing) challenged to move as fast as possible  2x10    Overground walking 200ft with QC  100ft no AD    Scifit L1  15min  Speed walking over 24ft x10, progressing to no AD    Best: 7 5s    Scifit L4 15min  Scifit L2 5 10min     Self ankle DF mob 10x 10"     Scifit L3 10min     Overground walking 100ft with cane, 200ft no AD holding 7# ball   5  Education         6  Return to driving        R ankle injury  Gastroc stretch 30"x2    Ankle 4 way   YTB 2x10 ea       Reviewed HEP    Seated ankle DF stretch 30"x2

## 2023-03-01 ENCOUNTER — APPOINTMENT (OUTPATIENT)
Dept: PHYSICAL THERAPY | Facility: REHABILITATION | Age: 38
End: 2023-03-01

## 2023-03-02 ENCOUNTER — OFFICE VISIT (OUTPATIENT)
Dept: PHYSICAL THERAPY | Facility: REHABILITATION | Age: 38
End: 2023-03-02

## 2023-03-02 DIAGNOSIS — G44.319 ACUTE POST-TRAUMATIC HEADACHE, NOT INTRACTABLE: ICD-10-CM

## 2023-03-02 DIAGNOSIS — F43.10 POST-TRAUMATIC STRESS: ICD-10-CM

## 2023-03-02 DIAGNOSIS — S09.90XD INJURY OF HEAD, SUBSEQUENT ENCOUNTER: ICD-10-CM

## 2023-03-02 DIAGNOSIS — S99.919A TRAUMATIC INJURY OF ANKLE: Primary | ICD-10-CM

## 2023-03-02 NOTE — PROGRESS NOTES
Daily Note     Today's date: 3/2/2023  Patient name: Meade Crigler  : 1985  MRN: [id number]  Referring provider: Kenneth Bonilla MD  Dx:   Encounter Diagnosis     ICD-10-CM    1  Traumatic injury of ankle  S99 919A       2  Injury of head, subsequent encounter  S09  90XD       3  Acute post-traumatic headache, not intractable  G44 319       4  Post-traumatic stress  F43 10            Subjective: Having a migraine today but didn't want to cancel therapy again  R ankle is feeling good, only some soreness after doing stretches, but no bothering her when walking anymore  Objective: See treatment diary below       Assessment: Tolerated treatment fair  At times with more need for rest breaks due to headache  More perceived difficulty with blaze pods today requesting deferral  Overall minor increase in instability noted today, but completed similar exercise volume  Reviewed HEP  Would benefit from continued PT  Plan: Restore ankle mobility and strength, reduce pain in order to return to treatment for dizziness and gait dysfunction     Precautions: PTSD, hx of repeated concussions, high symptom irritability      HEP:  Walking for exercise, continue with current duration but include episodes of faster walking; optokinetics of driving / looking at road signs as a passenger, hip abd, squats, marching, rows, (back up against wall), ankle gastroc stretch, seated ankle DF stretch, self ankle mob    Functional Impairment to Address  2/8 2/15 2/24 3   1  Bending over to do laundry/ manage cat litter/ clean cat vomit  Take cone from doctor stool or step stool, place on high/medium shelf 5x5 sets  Progressing to reducing any UE support  Take cone from doctor stool or step stool, place on high/medium shelf 5x2 sets  Progressed to cones on floor with UE as needed support [id number] Take cone from doctor stool or step stool, place on high/medium shelf 5x2 sets   Take cone from doctor stool or step stool, place on high/medium shelf 5x2 sets  2   Turning while ambulating or varying direction  Quick stops 200ft with AD  Quick stops 200ft with AD  Side stepping 15ft x8    Fwd/bwd walking 15ft x6    Speed walk no AD 60ft   Blaze pods 1min rounds x3, intermittent use of AD  Side stepping 15ft x4 no AD    Speed walking 30ft straight path, no AD  x8 timed  Walking fwd/bwd 15ft x4 laps    Side stepping 15ft x4 laps    Blaze pods 1min x3   3  Walking on uneven surfaces  Walking across darius fwd 5 laps no AD   4  Improve overall activity tolerance / walking tolerance / strength Speed walking over 24ft x10, progressing to no AD    Best: 7 5s    Scifit L4 15min  Scifit L2 5 10min     Self ankle DF mob 10x 10"     Scifit L3 10min     Overground walking 100ft with cane, 200ft no AD holding 7# ball Overground walking 100ft with AD    100ft x3 no AD     300ft no AD with cone weaving, stepping over small objects  Self selected speed  5    Education        6    Return to driving       R ankle injury

## 2023-03-07 ENCOUNTER — TELEPHONE (OUTPATIENT)
Dept: SLEEP CENTER | Facility: CLINIC | Age: 38
End: 2023-03-07

## 2023-03-08 ENCOUNTER — APPOINTMENT (OUTPATIENT)
Dept: PHYSICAL THERAPY | Facility: REHABILITATION | Age: 38
End: 2023-03-08

## 2023-03-14 ENCOUNTER — APPOINTMENT (OUTPATIENT)
Dept: PHYSICAL THERAPY | Facility: REHABILITATION | Age: 38
End: 2023-03-14

## 2023-03-14 ENCOUNTER — DOCUMENTATION (OUTPATIENT)
Dept: OTHER | Facility: HOSPITAL | Age: 38
End: 2023-03-14

## 2023-03-14 NOTE — PROGRESS NOTES
Patient seen in office for Day 1/ Drug visit of migraine clinical trial  Patient completed questionnaires provided  Provided patient education on self injection, teach back method implemented  70 mg/ml dose administered in left upper arm, lot # [id number]  Patient has no complaints or questions at this time

## 2023-03-15 ENCOUNTER — OFFICE VISIT (OUTPATIENT)
Dept: PHYSICAL THERAPY | Facility: REHABILITATION | Age: 38
End: 2023-03-15

## 2023-03-15 DIAGNOSIS — S99.919A TRAUMATIC INJURY OF ANKLE: Primary | ICD-10-CM

## 2023-03-15 DIAGNOSIS — F43.10 POST-TRAUMATIC STRESS: ICD-10-CM

## 2023-03-15 DIAGNOSIS — S09.90XD INJURY OF HEAD, SUBSEQUENT ENCOUNTER: ICD-10-CM

## 2023-03-15 DIAGNOSIS — G44.319 ACUTE POST-TRAUMATIC HEADACHE, NOT INTRACTABLE: ICD-10-CM

## 2023-03-15 NOTE — PROGRESS NOTES
Re-Evaluation      Today's date: 3/15/2023  Patient name: Jose Luis Quiros  : 1985  MRN: [id number]  Referring provider: Sharmin Tiwari MD  Dx:   Encounter Diagnosis     ICD-10-CM    1  Traumatic injury of ankle  S99 919A       2  Injury of head, subsequent encounter  S09  90XD       3  Acute post-traumatic headache, not intractable  G44 319       4  Post-traumatic stress  F43 10            Subjective: Had a shot yesterday for a new migraine drug neurology are trialing with her  Last week had a lot of headaches, but is feeling better today with only minor headache  States her movement lately is "ok"  Harder to do exercises last week, but able to more past few days  Her R ankle is feeling 100% improved now with no pain or swelling noted  Overall feels as though she is making progress with physical therapy  Continuing to increase how active she is at home with work a recreation  Still has good and bad days  Objective: See treatment diary below     No tenderness to R ankle      Ankle Range of Motion Right  Left    Dorsi flex 0 pain ant ankle     2 no pain      Plantar flex 56    70     Inversion 25 pain medial ankle    32       Eversion 10 pain medial ankle    15                Ankle MMT Right  Left   Dorsi Flex 3+    5     Plantar flex 4    5                Updated Goals  STGs  -Pt will be competent in comprehensive HEP for ankle - MET  -Pt will report reduced pain no greater than 2/10 at worst - MET     LTGs  -Pt will demonstrate improved ankle DF by at least 10 degrees - not met   -Pt will be able to perform walking around her block and 30-45min of gait/balance training w/o limitation due to pain - MET          Patient-Specific Functional Scale   Task is scored 0 (unable to perform activity) to 10 (ability to perform activity independently)  Activity    8/25 10/18/22 11/8  1/16 3/15   1  Bending over to do laundry/ manage cat litter/ clean cat vomit    3-4/10  4/10 6-7/10 7/10 7/10 8 5/10   2   Turning while walking  7/10  7/10 8/10 9/10 9/10 9/10   3    Walking on uneven surfaces  8-9/10  8 5/10 9/10 9/10 9/10 9/10   4  Not feeling dizzy after engaged in activities        Currently, can last for 2-60 minutes after activity  Most likely to provoke symptoms is bending over, turn at speed or sharp angles, or with using exercise bike  Overall activity tolerance improving, able to perform most activities, but still has post activity dizziness  Post activity dizziness still limiting  6/10 7/10   5                      Flowsheet Rows    Flowsheet Row Most Recent Value   Dizziness Handicap Index    Does looking up increase your problem? 0   Because of your problem, do you feel frustrated? 2   Because of your problem, do you restrict your travel for business or recreation? 2   Does walking down the aisle of a [employer] increase your problem? 0   Because of your problem, do you have difficulty getting in or out of bed? 0   Does your problem significantly restrict your participation in social activities ([employer], movies, dancing, parties)? 2   Because of your problem, do you have difficulty reading? 2   Does performing more ambitious activities such as sports, dancing, household chores (sweeping or putting dishes away) increase your problems? 2   Because of your problem, are you afraid to leave your home without having someone accompany you? 0   Because of your problem have you been embarrassed in front of others? 0   Do quick movements of your head increase your problem? 2   Because of your problem, do you avoid heights? 0   Does turning over in bed increase your problem? 2   Because of your problem, is it difficult for you to do strenuous homework or yardwork? 2   Because of your problem, are you afraid people may think you are intoxicated? 0   Because of your problem, is it difficult for you to go for a walk by yourself? 0   Does walking down a sidewalk increase your problem?  0   Because of your problem, is it difficult for you to concentrate? 2   Because of your problem, is it difficult for you to walk around your house in the dark? 2   Because of your problem, are you afraid to stay at home alone? 0   Because of your problem ,do you feel handicapped? 2   Has the problem placed stress on your relationships with members of your family? 2   Because of your problem, are you depressed? 2   Does your problem interfere with your job or household responsibilities? 2   Does bending over increase your problem? 2   Total score 30   Functional Gait Assessment    Gait Level Surface  2  [5 8s  Typical gait includes minor postural sway and deviation]   Change in GaiT Speed 2   Gait with horizontal head turns 3  [No change from her typical gait]   Gait with vertical head turns 2  [slows down, adjusts steps]   Gait and pivot tuen  3   Step over obstacle 1   Gait with narrow base of supprt 0   Gait with eyes closed 1   Ambulating backwards 1  [Slows and minor postural sway increase from baseline]   Steps 2   Total score  17        FGA: 17/30  DHI: 32     Gait speed  Self Selected:  1 0m/s  Fast Speed:  1 4m/s       Updated Goals:  Pt will demonstrate at least 3 point improvement on PSFS - MET  Pt will be able to achieve gait speed of at least 0 9m/s - MET     Pt will demonstrate a least 4 point improvement on FGA - MET  Pt will demonstrate competency with HEP and plan for managing and further progressing tolerance to daily activities - progressing        Assessment: Sagrario Fabian has presented consistently to PT with hx of chronic dizziness, intractable headaches, and activity intolerance following multiple head injures  Since last re-eval Sagrario Fabian has continued to make progress  She demonstrates improvement in FGA assessment indicating improved dynamic balance  Continued steady improvements noted in PSFS as well indicating reduced functional impairments   Important to note that her functional ability is going to be the most relevant and accurate predictor of progress with a patient with this presentation  Slow progress is also expected with this presentation of chronic dizziness and activity intolerance, however she is gradually making objective gains on testing and functional questionnaire  She continues though to demonstrate exaggerated postural sway and staggering with gait despite maintaining balance, dizziness with movement/activity, and slowed gait speed  This is limiting her ability to maintain a job, drive, performing work around her home, participate in recreation, and ambulate in the community  She would benefit from continued skilled PT to address these deficits and maximize function  R ankle was reassessed today as well and at this time demonstrates improved AROM and strength  No swelling or pain present today  Will discharge therapy for this diagnosis and continue for dizziness  We have worked consistently on extensive education for central sensitization of symptoms, and how she is no longer experiencing concussion as discussed in neurology  Plan: Discharge for R ankle  Continue PT with gradual progression for dizziness and activity tolerance  Incorporate stepping over obstacles  Precautions: PTSD, hx of repeated concussions, high symptom irritability      HEP:  Walking for exercise, continue with current duration but include episodes of faster walking; optokinetics of driving / looking at road signs as a passenger, hip abd, squats, marching, rows, (back up against wall), ankle gastroc stretch, seated ankle DF stretch, self ankle mob    Functional Impairment to Address  2/8 2/15 2/24 3/2   1  Bending over to do laundry/ manage cat litter/ clean cat vomit  Take cone from doctor stool or step stool, place on high/medium shelf 5x5 sets  Progressing to reducing any UE support  Take cone from doctor stool or step stool, place on high/medium shelf 5x2 sets      Progressed to cones on floor with UE as needed support [id number] Take cone from doctor stool or step stool, place on high/medium shelf 5x2 sets  Take cone from doctor stool or step stool, place on high/medium shelf 5x2 sets  2   Turning while ambulating or varying direction  Quick stops 200ft with AD  Quick stops 200ft with AD  Side stepping 15ft x8    Fwd/bwd walking 15ft x6    Speed walk no AD 60ft   Blaze pods 1min rounds x3, intermittent use of AD  Side stepping 15ft x4 no AD    Speed walking 30ft straight path, no AD  x8 timed  Walking fwd/bwd 15ft x4 laps    Side stepping 15ft x4 laps    Blaze pods 1min x3   3  Walking on uneven surfaces  Walking across darius fwd 5 laps no AD   4  Improve overall activity tolerance / walking tolerance / strength Speed walking over 24ft x10, progressing to no AD    Best: 7 5s    Scifit L4 15min  Scifit L2 5 10min     Self ankle DF mob 10x 10"     Scifit L3 10min     Overground walking 100ft with cane, 200ft no AD holding 7# ball Overground walking 100ft with AD    100ft x3 no AD     300ft no AD with cone weaving, stepping over small objects  Self selected speed  5    Education        6  Return to driving       7   STEP OVER OBSTACLES     NV

## 2023-03-16 ENCOUNTER — OFFICE VISIT (OUTPATIENT)
Dept: SLEEP CENTER | Facility: CLINIC | Age: 38
End: 2023-03-16

## 2023-03-16 VITALS
DIASTOLIC BLOOD PRESSURE: 73 MMHG | SYSTOLIC BLOOD PRESSURE: 123 MMHG | HEART RATE: 66 BPM | WEIGHT: 274 LBS | HEIGHT: 63 IN | BODY MASS INDEX: 48.55 KG/M2

## 2023-03-16 DIAGNOSIS — G47.20 CIRCADIAN RHYTHM SLEEP DISORDER: ICD-10-CM

## 2023-03-16 DIAGNOSIS — G47.33 OBSTRUCTIVE SLEEP APNEA: Primary | ICD-10-CM

## 2023-03-16 DIAGNOSIS — G47.00 INSOMNIA, UNSPECIFIED TYPE: ICD-10-CM

## 2023-03-16 NOTE — PATIENT INSTRUCTIONS
Schedule home sleep study  The nurse will contact you after the study to review results and plan of treatment  Work on maintaining a consistent wake up time  Consider use of melatonin 3-5mg 9-10 hours before your wake up time to help improve circadian rhythm and sleep initiation insomnia  Avoid going to bed at a specific time, but when you feel sleepy  Avoid napping for long periods of time or late in the day  You may consider reading "No More Sleepless Nights" by Dr Gabe Sandoval and Dr Iveth Crowell, "Say Good Night to Insomnia" by Dr Eloise Spicer, "[address] and Get to Sleep" by Alexandre Darby and Tia Neil  These books can help you to fall asleep without the use of medications over time  Tips for Healthy Sleep   AMBULATORY CARE:   What you need to know about healthy sleep:  Healthy sleep, or sleep hygiene, is important to your physical, mental, and emotional health  Sleep affects almost every part of your body, including your brain, heart, metabolism, immune system, and mood  Good sleep habits can help you fall asleep and stay asleep during the night  Poor quality sleep or a long-term lack of sleep increases your risk for certain disorders  These include high blood pressure, cardiovascular disease, obesity, depression, and diabetes  What you need to know about sleep stages: The 2 main kinds of sleep are rapid eye movement (REM) and non-REM  You cycle through REM and non-REM many times during the night  Stage 1 non-REM sleep  is when you first fall asleep  This stage is short  Your brain waves slow and your body relaxes  Stage 2 non-REM sleep  is a period of light sleep before you move into deeper sleep  You spend the most time in this stage during the night  Your body temperature drops and your body relaxes even more  Stage 3 non-REM sleep  is a period of deep sleep that starts after stage 2  This stage is needed to feel refreshed in the morning       REM sleep  starts about 90 minutes after you fall asleep  Your eyes move from side to side  Your heart rate, blood pressure, and breathing become faster  Most of your dreams occur during REM sleep  As you age, you spend less time in REM sleep  How much sleep you need:  Each person needs a different amount of sleep  Your sleep patterns and needs change as you age  In general, school-aged children and teenagers need about 9 to 10 hours of sleep a night  Most adults need about 7 to 9 hours of sleep a night  After age 61 years, sleep may be lighter and for less time, with more periods of wakefulness  Older adults may fall asleep and wake up earlier than they did at a younger age  Medicines or conditions, such as chronic pain, may keep older adults awake  How to know you are getting healthy sleep:   Keep a 2-week log of your sleep  Write down what time you got up, what you did that day, and anything else that could affect your sleep  Keep a record of your sleep patterns, and any sleeping problems you have  Bring the record to your follow-up visits  Ask someone who lives with you if they notice anything about your sleep  For example, maybe you snore loudly or stop breathing for short periods  Tell your healthcare provider if your legs twitch or you feel like you can't keep them still  Your healthcare provider may refer to you a sleep specialist or cognitive behavioral therapy  Call your doctor if:   Someone has told you that you stop breathing when you sleep  Your legs twitch and it keeps you from sleeping  You begin to use drugs or alcohol to fall asleep  You have questions or concerns about your sleep habits  How to improve your sleep:  Your daily routines influence your sleep  Nutrition, medicines, your schedule, and what you do in the evenings can affect your sleep  Do the following to help improve your sleep:  Create a sleep schedule  Go to sleep and wake up at the same time every day  Be consistent, even on weekends or when you travel  Do not go to bed unless you are sleepy  Set up a calming routine before bed  Soak in a warm bath, listen to relaxing music, or read a book  Turn off all electronics at least 30 minutes before bedtime  Try not to watch television or use any electronics in the bedroom  Limit naps to 20 or 30 minutes, earlier in the day  Naps could make it hard for you to fall asleep at bedtime  Keep your bedroom cool, quiet, and dark  Turn on white noise, such as a fan, to help you relax  Get up if you do not fall asleep within 20 minutes  Move to another room and do something relaxing until you become sleepy  Limit caffeine, alcohol, and food to earlier in the day  Only drink caffeine in the morning  Do not drink alcohol within 6 hours of bedtime  Do not eat a heavy meal right before you go to bed  Limit how much liquid you drink in the evenings and right before bed  If you are prescribed diuretics, or water pills, take them early in the day  Exercise regularly  Daily exercise may help you sleep better  Do not exercise within 3 hours of bedtime  Follow up with your doctor as directed:  Bring your sleep log with you  Write down your questions so you remember to ask them during your visits  © Copyright [employer] 2022 Information is for End User's use only and may not be sold, redistributed or otherwise used for commercial purposes  The above information is an  only  It is not intended as medical advice for individual conditions or treatments  Talk to your doctor, nurse or pharmacist before following any medical regimen to see if it is safe and effective for you  Nursing Support:  When: Monday through Friday 7A-5PM except holidays  Where: Our direct line is [phone]  If you are having a true emergency please call 911  In the event that the line is busy or it is after hours please leave a voice message and we will return your call    Please speak clearly, leaving your full name, birth date, best number to reach you and the reason for your call  Medication refills: We will need the name of the medication, the dosage, the ordering provider, whether you get a 30 or 90 day refill, and the pharmacy name and address  Medications will be ordered by the provider only  Nurses cannot call in prescriptions  Please allow 7 days for medication refills  Physician requested updates: If your provider requested that you call with an update after starting medication, please be ready to provide us the medication and dosage, what time you take your medication, the time you attempt to fall asleep, time you fall asleep, when you wake up, and what time you get out of bed  Sleep Study Results: We will contact you with sleep study results and/or next steps after the physician has reviewed your testing

## 2023-03-16 NOTE — PROGRESS NOTES
Consultation - Kalia 89, 1985, MRN: [id number]    3/16/2023        Reason for Consult / Principal Problem:  Evaluation of possible Obstructive Sleep Apnea  Insomnia - sleep maintenance  Migraines  Hx of traumatic brain injury       Thank you for the opportunity of participating in the evaluation and care of this patient in the Sleep Clinic at [facility]  Subjective:     HPI: Vilma Kasper is a 43y o  year old female  She presents for a consultation regarding concern of possible obstructive sleep apnea, as referred by neurology  She is followed for migraines and history of traumatic brain injury  She is in a migraine clinical trial [employer] 334 once monthly injectable drug  She received her first dose on 3/14/23  She reports a history of sleep initiation insomnia, as well as worsening sleep maintenance insomnia  She awakens 3-5 times per night for unknown reasons  She does not feel rested on nights when she wakes up frequently and is unable to return to sleep for several hours  She is not currently working, due to disability  Since not working, her sleep/wake schedule has shifted to a delayed phase pattern  She finds that if she does not have responsibilities, such as work, she will begin to sleep later and fall into a pattern of going to sleep around 5:00am and waking up around 1:30pm   This feels more natural to her  Comorbid conditions:  Obesity  Migraines    Pertinent symptoms:  snoring, Hasty 6, chronic or am headache and unrefreshing sleep      Sleep Study Results:  No prior sleep study    Employment:  She is currently disabled  She previously worked as a  for [address] [place] [address] [employer]  She worked between the hours of 8:00am-4:00pm M-F  Sleep Schedule:       Bedtime:  11:00pm on week days and midnight on weekends      Latency:   At least one hour, but she avoids looking at the clock  Wakeup time:  She sets the alarm for 8:30am on week days and 9:30am on weekends, but lately has been sleeping through the alarm and waking up when ever she wakes up, which has been between 10:00-10:30am, but more recently she is sleeping until 1:30pm     Awakenings:       Frequency:  3-5 times per night      Causes:  Her cat wakes her, dreams wake her, unknown causes      Duration: This varies, can be a few minutes to a few hours    Daytime Sleepiness / Inappropriate Sleep:       Most severe: This varies, but mainly in the early to mid afternoon       Naps :  2 times per month      Time:  Between 1-3:00pm      Duration:  2-3 hours Naps can be refreshing in quality, but sometimes she feels like she needs to keep sleeping      Inappropriate drowsiness / sleep:  Occasionally dozes if she is not engaged in an activity, such as while watching TV, but can stay awake if reading or her mind is stimulated  Snoring:  She snores    Apnea:  No reports of witnessed apnea    Change in Weight:  She has been working on weight loss over the past month - lost 6 lbs  Restless Leg Syndrome:  She occasionally has clinical symptoms consistent with this diagnosis including paresthesias in her lower legs along the back of the calves with a restless sensation  Symptoms improve with movement  Other Complaints:  She talks in her sleep  No reports of sleep walking, sleep paralysis or hallucinations surrounding sleep  She constantly has a headache at a level of 1  She can wake up with a stronger headache than she went to bed with, but does not feel there has been a correlation with worsening headaches with more frequent night time awakenings  She reports bruxism  Social History:      Caffeine:  8-20 ounces of soda per day       Tobacco:   reports that she has never smoked   She has never used smokeless tobacco      E-cig/Vaping:    E-Cigarette/Vaping   • E-Cigarette Use Never User E-Cigarette/Vaping Substances   • Nicotine No    • THC No    • CBD No    • Flavoring No    • Other No    • Unknown No          Alcohol:   reports that she does not currently use alcohol  Drugs:   reports no history of drug use  The review of systems and following portions of the patient's history were reviewed and updated as appropriate: allergies, current medications, past family history, past medical history, past social history, past surgical history and problem list         Objective:       Vitals:    03/16/23 1227   BP: 123/73   BP Location: Left arm   Patient Position: Sitting   Cuff Size: Large   Pulse: 66   Weight: 124 kg (274 lb)   Height: 5' 3" (1 6 m)     Body mass index is 48 54 kg/m²  Neck Circumference: 16  [place] Sleepiness Scale: Total score: 6      Current Outpatient Medications:   •  ibuprofen (MOTRIN) 200 mg tablet, Take 400 mg by mouth every 6 (six) hours as needed for mild pain, Disp: , Rfl:   •  Multiple Vitamins-Minerals (ONE-A-DAY WOMENS PO), Take 1 tablet by mouth in the morning, Disp: , Rfl:   •  rizatriptan (MAXALT) 10 MG tablet, Take 1 tablet (10 mg total) by mouth once as needed for migraine May repeat in 2 hours if needed  Max 2/24 hours, 9/month , Disp: 9 tablet, Rfl: 6  •  methylPREDNISolone 4 MG tablet therapy pack, Use as directed on package (Patient not taking: Reported on 1/3/2023), Disp: 21 each, Rfl: 0  •  topiramate (TOPAMAX) 25 mg tablet, 1 tab PO QHS for 1 week, increase as tolerated to 1 tab BID for 1 week, then 1 tab QAM and 2 tabs QHS for 1 week and finish at 2 tabs BID   (Patient not taking: Reported on 3/16/2023), Disp: 120 tablet, Rfl: 4    Physical Exam  General Appearance:   Alert, cooperative, no distress, appears stated age, obese             Nose:  Nares normal, septum midline, mucosa normal, no drainage or sinus tenderness           Throat:  Lips, teeth and gums normal; tongue normal in size, scalloped and midline in position; mucosa moist and mildly redundant bilaterally, uvula normal, tonsils not visualized, Mallampati class 3-4       Neck:  Supple, short and thick, symmetrical, trachea midline, no adenopathy; no thyromegaly noted, no carotid bruit or JVD     Lungs:      Clear to auscultation bilaterally, respirations unlabored     Heart:   Regular rate and rhythm, S1 and S2 normal, no murmur, rub or gallop       Extremities:  Extremities normal, atraumatic, no cyanosis or edema       Skin:  Skin color, texture, turgor normal, no rashes or lesions       Neurologic:  No focal deficits noted  ASSESSMENT / PLAN     1  Obstructive sleep apnea  Ambulatory referral to Sleep Medicine    Ambulatory Referral to Sleep Medicine    Home Study      2  Insomnia, unspecified type  Ambulatory Referral to Sleep Medicine    Home Study    sleep maintenance      3  Circadian rhythm sleep disorder      delayed phase            Counseling / Coordination of Care  I have spent a total time of 60 minutes on 03/16/23 in caring for this patient including Risks and benefits of tx options, Instructions for management, Patient and family education, Importance of tx compliance, Risk factor reductions, Impressions, Counseling / Coordination of care, Documenting in the medical record, Reviewing / ordering tests, medicine, procedures  , Obtaining or reviewing history   and Communicating with other healthcare professionals   Today we discussed the anatomy and physiology of the upper airway  I pointed out how changes in this region can result in both snoring and abnormal breathing events including apneas and hypopneas  I explained the most common co-morbidities of untreated sleep apnea  After this we talked about some forms of treatment including application of positive airway pressure, mandibular advancement devices and surgery       In order to evaluate the possibility of Obstructive Sleep Apnea as a cause of the patient's symptoms, a home sleep study will be completed to identify the presence or absence of abnormal nocturnal breathing  If significant abnormal nocturnal breathing is detected, nasal CPAP will be titrated to find the optimum pressure needed to maintain upper airway patency during sleep  This may be accomplished using APAP or may require an in lab titration study  Following testing, the patient will return to the [address] for set up of CPAP equipment with follow up visit to review compliance and effectiveness of treatment 31-91 days after set up  We also discussed delayed phase circadian rhythm sleep disorder at length  She prefers to keep more of a traditional schedule that would accommodate daytime work in the future  We discussed sleep hygiene and how to transition her sleep back to this type of schedule  We discussed trying melatonin 3-5mg, if she is having difficulty  Books regarding insomnia were also recommended  The following instructions have been given to the patient today:    Patient Instructions     1  Schedule home sleep study  2  The nurse will contact you after the study to review results and plan of treatment  3  Work on maintaining a consistent wake up time  4  Consider use of melatonin 3-5mg 9-10 hours before your wake up time to help improve circadian rhythm and sleep initiation insomnia  Avoid going to bed at a specific time, but when you feel sleepy  5  Avoid napping for long periods of time or late in the day  You may consider reading "No More Sleepless Nights" by Dr Argenis Lucero and Dr Staci Gatica, "Say Good Night to Insomnia" by Dr Ann Melo, "[address] and Get to Sleep" by Jaron Sánchez and Mira Powell  These books can help you to fall asleep without the use of medications over time  Tips for Healthy Sleep   AMBULATORY CARE:   What you need to know about healthy sleep:  Healthy sleep, or sleep hygiene, is important to your physical, mental, and emotional health   Sleep affects almost every part of your body, including your brain, heart, metabolism, immune system, and mood  Good sleep habits can help you fall asleep and stay asleep during the night  Poor quality sleep or a long-term lack of sleep increases your risk for certain disorders  These include high blood pressure, cardiovascular disease, obesity, depression, and diabetes  What you need to know about sleep stages: The 2 main kinds of sleep are rapid eye movement (REM) and non-REM  You cycle through REM and non-REM many times during the night  • Stage 1 non-REM sleep  is when you first fall asleep  This stage is short  Your brain waves slow and your body relaxes  • Stage 2 non-REM sleep  is a period of light sleep before you move into deeper sleep  You spend the most time in this stage during the night  Your body temperature drops and your body relaxes even more  • Stage 3 non-REM sleep  is a period of deep sleep that starts after stage 2  This stage is needed to feel refreshed in the morning  • REM sleep  starts about 90 minutes after you fall asleep  Your eyes move from side to side  Your heart rate, blood pressure, and breathing become faster  Most of your dreams occur during REM sleep  As you age, you spend less time in REM sleep  How much sleep you need:  Each person needs a different amount of sleep  Your sleep patterns and needs change as you age  In general, school-aged children and teenagers need about 9 to 10 hours of sleep a night  Most adults need about 7 to 9 hours of sleep a night  After age 61 years, sleep may be lighter and for less time, with more periods of wakefulness  Older adults may fall asleep and wake up earlier than they did at a younger age  Medicines or conditions, such as chronic pain, may keep older adults awake  How to know you are getting healthy sleep:   • Keep a 2-week log of your sleep  Write down what time you got up, what you did that day, and anything else that could affect your sleep   Keep a record of your sleep patterns, and any sleeping problems you have  Bring the record to your follow-up visits  • Ask someone who lives with you if they notice anything about your sleep  For example, maybe you snore loudly or stop breathing for short periods  Tell your healthcare provider if your legs twitch or you feel like you can't keep them still  Your healthcare provider may refer to you a sleep specialist or cognitive behavioral therapy  Call your doctor if:   • Someone has told you that you stop breathing when you sleep  • Your legs twitch and it keeps you from sleeping  • You begin to use drugs or alcohol to fall asleep  • You have questions or concerns about your sleep habits  How to improve your sleep:  Your daily routines influence your sleep  Nutrition, medicines, your schedule, and what you do in the evenings can affect your sleep  Do the following to help improve your sleep:  • Create a sleep schedule  Go to sleep and wake up at the same time every day  Be consistent, even on weekends or when you travel  Do not go to bed unless you are sleepy  • Set up a calming routine before bed  Soak in a warm bath, listen to relaxing music, or read a book  • Turn off all electronics at least 30 minutes before bedtime  Try not to watch television or use any electronics in the bedroom  • Limit naps to 20 or 30 minutes, earlier in the day  Naps could make it hard for you to fall asleep at bedtime  • Keep your bedroom cool, quiet, and dark  Turn on white noise, such as a fan, to help you relax  • Get up if you do not fall asleep within 20 minutes  Move to another room and do something relaxing until you become sleepy  • Limit caffeine, alcohol, and food to earlier in the day  Only drink caffeine in the morning  Do not drink alcohol within 6 hours of bedtime  Do not eat a heavy meal right before you go to bed  Limit how much liquid you drink in the evenings and right before bed   If you are prescribed diuretics, or water pills, take them early in the day  • Exercise regularly  Daily exercise may help you sleep better  Do not exercise within 3 hours of bedtime  Follow up with your doctor as directed:  Bring your sleep log with you  Write down your questions so you remember to ask them during your visits  © Copyright [facility] 2022 Information is for End User's use only and may not be sold, redistributed or otherwise used for commercial purposes  The above information is an  only  It is not intended as medical advice for individual conditions or treatments  Talk to your doctor, nurse or pharmacist before following any medical regimen to see if it is safe and effective for you  Nursing Support:  When: Monday through Friday 7A-5PM except holidays  Where: Our direct line is [phone]  If you are having a true emergency please call 911  In the event that the line is busy or it is after hours please leave a voice message and we will return your call  Please speak clearly, leaving your full name, birth date, best number to reach you and the reason for your call  Medication refills: We will need the name of the medication, the dosage, the ordering provider, whether you get a 30 or 90 day refill, and the pharmacy name and address  Medications will be ordered by the provider only  Nurses cannot call in prescriptions  Please allow 7 days for medication refills  Physician requested updates: If your provider requested that you call with an update after starting medication, please be ready to provide us the medication and dosage, what time you take your medication, the time you attempt to fall asleep, time you fall asleep, when you wake up, and what time you get out of bed  Sleep Study Results: We will contact you with sleep study results and/or next steps after the physician has reviewed your testing          LYNDSEY Young  [facility]      Portions of the record may have been created with voice recognition software  Occasional wrong word or "sound a like" substitutions may have occurred due to the inherent limitations of voice recognition software  Read the chart carefully and recognize, using context, where substitutions have occurred

## 2023-03-21 ENCOUNTER — APPOINTMENT (OUTPATIENT)
Dept: PHYSICAL THERAPY | Facility: REHABILITATION | Age: 38
End: 2023-03-21

## 2023-03-23 ENCOUNTER — APPOINTMENT (OUTPATIENT)
Dept: PHYSICAL THERAPY | Facility: REHABILITATION | Age: 38
End: 2023-03-23

## 2023-03-28 ENCOUNTER — APPOINTMENT (OUTPATIENT)
Dept: PHYSICAL THERAPY | Facility: REHABILITATION | Age: 38
End: 2023-03-28

## 2023-03-29 ENCOUNTER — OFFICE VISIT (OUTPATIENT)
Dept: PHYSICAL THERAPY | Facility: REHABILITATION | Age: 38
End: 2023-03-29

## 2023-03-29 DIAGNOSIS — G44.319 ACUTE POST-TRAUMATIC HEADACHE, NOT INTRACTABLE: ICD-10-CM

## 2023-03-29 DIAGNOSIS — S99.919A TRAUMATIC INJURY OF ANKLE: Primary | ICD-10-CM

## 2023-03-29 DIAGNOSIS — S09.90XD INJURY OF HEAD, SUBSEQUENT ENCOUNTER: ICD-10-CM

## 2023-03-29 DIAGNOSIS — F43.10 POST-TRAUMATIC STRESS: ICD-10-CM

## 2023-03-29 NOTE — PROGRESS NOTES
Daily Note      Today's date: 3/29/2023  Patient name: Negrito Reid  : 1985  MRN: [id number]  Referring provider: Rick Morales MD  Dx:   No diagnosis found  Subjective: Was having a lot more headache and dizziness in the past two weeks which started after starting her     Objective: See treatment diary below         Assessment: Tolerated treatment well  Exercise intensity was reduced to accommodate for increased instability and perceived dizziness  Initiated working on stepping over obstacles with low heights to start  Also trialed hiking pole for AD as this is a more appropriate AD  Set goals for returning to baseline with reducing cane use in house and returning to walks outside  Educated on importance of return to baseline function as soon as able  Would benefit from continued PT  Plan: Discharge for R ankle  Continue PT with gradual progression for dizziness and activity tolerance  Incorporate stepping over obstacles  Precautions: PTSD, hx of repeated concussions, high symptom irritability      HEP:  Walking for exercise, continue with current duration but include episodes of faster walking; optokinetics of driving / looking at road signs as a passenger, hip abd, squats, marching, rows, (back up against wall), ankle gastroc stretch, seated ankle DF stretch, self ankle mob    Functional Impairment to Address  2/15 2/24 3/2 3/29   1  Bending over to do laundry/ manage cat litter/ clean cat vomit  Take cone from doctor stool or step stool, place on high/medium shelf 5x2 sets  Progressed to cones on floor with UE as needed support [id number] Take cone from doctor stool or step stool, place on high/medium shelf 5x2 sets  Take cone from doctor stool or step stool, place on high/medium shelf 5x2 sets  2   Turning while ambulating or varying direction  Quick stops 200ft with AD      Side stepping 15ft x8    Fwd/bwd walking 15ft x6    Speed walk no AD 60ft   Blaze pods 1min rounds x3, "intermittent use of AD  Side stepping 15ft x4 no AD    Speed walking 30ft straight path, no AD  x8 timed  Walking fwd/bwd 15ft x4 laps    Side stepping 15ft x4 laps    Blaze pods 1min x3 Walking fwd/bwd 10ft x3 laps    Side stepping 10ft x3 laps     Walking to colored dots with hiking pole  Tap color on command 8min     3  Walking on uneven surfaces  Walking across darius fwd 5 laps no AD    4  Improve overall activity tolerance / walking tolerance / strength Scifit L2 5 10min     Self ankle DF mob 10x 10\"     Scifit L3 10min     Overground walking 100ft with cane, 200ft no AD holding 7# ball Overground walking 100ft with AD    100ft x3 no AD     300ft no AD with cone weaving, stepping over small objects  Self selected speed  Walking over ground 300ft reducing reliance     Scifit 10min    5  Education     GOALS  1  Reduce use of cane in home to 10% of time  2  Take at least 1 walk up the block and back    6  Return to driving       7  STEP OVER OBSTACLES    NV Take bean bag, step over 2in cones and golf club  Place on surface   In //bars x10       "

## 2023-04-03 ENCOUNTER — TELEPHONE (OUTPATIENT)
Dept: NEUROLOGY | Facility: CLINIC | Age: 38
End: 2023-04-03

## 2023-04-03 ENCOUNTER — OFFICE VISIT (OUTPATIENT)
Dept: PHYSICAL THERAPY | Facility: REHABILITATION | Age: 38
End: 2023-04-03

## 2023-04-03 DIAGNOSIS — S09.90XD INJURY OF HEAD, SUBSEQUENT ENCOUNTER: ICD-10-CM

## 2023-04-03 DIAGNOSIS — S99.919A TRAUMATIC INJURY OF ANKLE: Primary | ICD-10-CM

## 2023-04-03 DIAGNOSIS — G44.319 ACUTE POST-TRAUMATIC HEADACHE, NOT INTRACTABLE: ICD-10-CM

## 2023-04-03 DIAGNOSIS — F43.10 POST-TRAUMATIC STRESS: ICD-10-CM

## 2023-04-03 NOTE — TELEPHONE ENCOUNTER
Called and spoke to patient to confirm their upcoming appointment with Dr Garza Rater  Informed patient about arriving in the [place] location 15 minutes prior to their appointment to get checked in and going over chart

## 2023-04-03 NOTE — PROGRESS NOTES
Daily Note      Today's date: 4/3/2023  Patient name: Bogdan Wong  : 1985  MRN: [id number]  Referring provider: Romero Jansen MD  Dx:   Encounter Diagnosis     ICD-10-CM    1  Traumatic injury of ankle  S99 919A       2  Injury of head, subsequent encounter  S09  90XD       3  Acute post-traumatic headache, not intractable  G44 319       4  Post-traumatic stress  F43 10            Subjective: Making Some progress with goals set last visit, but has not fully walked up and back on the block yet  Notes using her cane less in the home compared to last visit, but still working towards goal of 10%    Objective: See treatment diary below         Assessment: Tolerated treatment well  Discussed goals and strategies to continue working towards them such as standing rest breaks during walks up the block  Pt demonstrated improved tolerance to exercises compared to last visit  Still with greater instability and reported dizziness, but less fatigued today  Provided needed encoruagement and cues to focus on successes with exercises along with distraction from task/exercise  Would benefit from continued PT  Plan: Discharge for R ankle  Continue PT with gradual progression for dizziness and activity tolerance  Incorporate stepping over obstacles  Precautions: PTSD, hx of repeated concussions, high symptom irritability      HEP:  Walking for exercise, continue with current duration but include episodes of faster walking; optokinetics of driving / looking at road signs as a passenger, hip abd, squats, marching, rows, (back up against wall), ankle gastroc stretch, seated ankle DF stretch, self ankle mob    Functional Impairment to Address  2/24 3/2 3/29 4/3   1  Bending over to do laundry/ manage cat litter/ clean cat vomit  Take cone from doctor stool or step stool, place on high/medium shelf 5x2 sets  Take cone from doctor stool or step stool, place on high/medium shelf 5x2 sets    Take cone from doctor stool or step stool, place on high/medium shelf 6x2 sets  2   Turning while ambulating or varying direction  Blaze pods 1min rounds x3, intermittent use of AD  Side stepping 15ft x4 no AD    Speed walking 30ft straight path, no AD  x8 timed  Walking fwd/bwd 15ft x4 laps    Side stepping 15ft x4 laps    Blaze pods 1min x3 Walking fwd/bwd 10ft x3 laps    Side stepping 10ft x3 laps     Walking to colored dots with hiking pole  Tap color on command 8min   Walking to colored dots with hiking pole  Tap color on command 10min   3  Walking on uneven surfaces  Walking across darius fwd 5 laps no AD     4  Improve overall activity tolerance / walking tolerance / strength Scifit L3 10min     Overground walking 100ft with cane, 200ft no AD holding 7# ball Overground walking 100ft with AD    100ft x3 no AD     300ft no AD with cone weaving, stepping over small objects  Self selected speed  Walking over ground 300ft reducing reliance     Scifit 10min  Walking over ground 300ft reducing reliance     Scifit 15min goal    5  Education    GOALS  1  Reduce use of cane in home to 10% of time  2  Take at least 1 walk up the block and back     6  Return to driving       7  STEP OVER OBSTACLES   NV Take bean bag, step over 2in cones and golf club  Place on surface  In //bars x10 Take bean bag, step over 2in cones and golf club  Place on surface   In //bars x6

## 2023-04-11 ENCOUNTER — DOCUMENTATION (OUTPATIENT)
Dept: OTHER | Facility: HOSPITAL | Age: 38
End: 2023-04-11

## 2023-04-12 ENCOUNTER — APPOINTMENT (OUTPATIENT)
Dept: PHYSICAL THERAPY | Facility: REHABILITATION | Age: 38
End: 2023-04-12

## 2023-04-24 ENCOUNTER — APPOINTMENT (OUTPATIENT)
Dept: PHYSICAL THERAPY | Facility: REHABILITATION | Age: 38
End: 2023-04-24

## 2023-04-27 ENCOUNTER — OFFICE VISIT (OUTPATIENT)
Dept: PHYSICAL THERAPY | Facility: REHABILITATION | Age: 38
End: 2023-04-27

## 2023-04-27 DIAGNOSIS — G44.319 ACUTE POST-TRAUMATIC HEADACHE, NOT INTRACTABLE: ICD-10-CM

## 2023-04-27 DIAGNOSIS — S09.90XD INJURY OF HEAD, SUBSEQUENT ENCOUNTER: Primary | ICD-10-CM

## 2023-04-27 DIAGNOSIS — F43.10 POST-TRAUMATIC STRESS: ICD-10-CM

## 2023-04-27 NOTE — PROGRESS NOTES
Re-Evaluation      Today's date: 2023  Patient name: Moustapha Modi  : 1985  MRN: [id number]  Referring provider: Casa Cummings MD  Dx:   Encounter Diagnosis     ICD-10-CM    1  Injury of head, subsequent encounter  S09  90XD       2  Acute post-traumatic headache, not intractable  G44 319       3  Post-traumatic stress  F43 10            Subjective: Not sleeping well  Bad Migraine on Monday, but overall she states the medication she is doing a trial of is helping her migraines  3min dizziness episode with no clear cause while walking in the [place]  Overall has been struggling more with mobility due to increased dizziness which she attributes to starting this medication  Considering stopping using at the end of May once the trial is over because the trade off is not worth it  Saw neurology on 4/10  Doesn't note any significant improvements over the past 4 weeks overall in her function  Was seeing psychology in the past, but stopped due to the high out of pocket pay (not insured)  She does have a doctor she sees for sleep medicine  Hasn't been using any meds she was prescribed for sleep due to her trial for migraine med  Objective: See treatment diary below     Patient-Specific Functional Scale   Task is scored 0 (unable to perform activity) to 10 (ability to perform activity independently)  Activity    8/25 10/18/22 11/8  1/16 3/15 4/27   1  Bending over to do laundry/ manage cat litter/ clean cat vomit  3-4/10  4/10 6-7/10 7/10 7/10 8 5/10 7/10   2   Turning while walking  7/10  7/10 8/10 9/10 9/10 9/10 8/10   3    Walking on uneven surfaces  -9/10  8 5/10 9/10 9/10 9/10 9/10 9/10   4    Not feeling dizzy after engaged in activities         Currently, can last for 2-60 minutes after activity   Most likely to provoke symptoms is bending over, turn at speed or sharp angles, or with using exercise bike    Overall activity tolerance improving, able to perform most activities, but still has post "activity dizziness   Post activity dizziness still limiting  6/10 7/10 6/10   5                      Gait speed:    0 36m/s  Fast: 0 46m/s    DHI:  38    Updated Goals:  Pt will demonstrate at least 3 point improvement on PSFS - MET  Pt will be able to achieve gait speed of at least 0 9m/s - MET     Pt will demonstrate a least 4 point improvement on FGA - MET  Pt will demonstrate competency with HEP and plan for managing and further progressing tolerance to daily activities - MET    Assessment: Ihsan Pringle has presented to physical therapy for chronic dizziness and impaired mobility/balance secondary to a complex hx of concussion and post-traumatic stress  Since last re-eval her attendance has been negatively affected by increased more unpredictable headaches from clinical trail of new migraine medication  Since starting PT she has made slow but steady progress but unfortunately progress has been negatively affected by missed visits and increased dizziness (possible side affect of new med)  Measures today demonstrates slight regression in PSFS, gait speed, and DHI  Untreated PTSD/depression are limiting factors in her progress  Through coming to [address] has developed good understand of her dx and the maladaptive neuroplastic changes that can occur in post concussive syndrome and persistent dizziness  Also demonstrates good understanding of treatment strategies she can implement to promote positive neural \"re-wiring\" and reduce her impairments  Pt will be a needed part of her recovery in progressing her program as she improves, but unfortunately is not progressing at this time due to aforementioned factors  Recommending hiatus from PT until she finished trial of migraine med or is better able to work with dizziness side affect  In the mean time was provided with additional continuing education and resources on improving sleep and getting psyc services as addressing these factors will also be needed to improve progress   " "    Plan: Discharge to HEP  Follow up in 4-8 weeks  Seek out psych services  Address sleep with sleep hygiene and sleep medicine follow up  Precautions: PTSD, hx of repeated concussions, high symptom irritability      HEP:  Walking for exercise, continue with current duration but include episodes of faster walking; optokinetics of driving / looking at road signs as a passenger, hip abd, squats, marching, rows, (back up against wall), using fun with activities     Functional Impairment to Address  4/3 4/17 4/27   1  Bending over to do laundry/ manage cat litter/ clean cat vomit  Take cone from doctor stool or step stool, place on high/medium shelf 6x2 sets  Take cone from doctor stool or step stool, place on high/medium shelf 10 cones x2 sets  Low cones from/to step stool were 7/10 \"challenging but doable\"     2  Turning while ambulating or varying direction  Walking to colored dots with hiking pole  Tap color on command 10min Walking to colored dots with hiking pole  Tap color on command     2 min: 13 hits  2 min: 14 hits    6 5/10 (\"challenging but doable\")    3    Walking on uneven surfaces  4    Improve overall activity tolerance / walking tolerance / strength Walking over ground 300ft reducing reliance     Scifit 15min goal  Walking over ground 400ft (200ft w/ SPC/ 200ft w/ no SPC) reducing reliance on [facility]    Soccer ball kicks to PT (5 min)    5  Education   1  Focus on successes and limit catastrophizing  2  External focus of attention w/ activity 1  Sleep hygiene  2  psych services  3  neuroplasticity and factors affecting Post concussive syndrome and persistent dizziness  4  Review of strategies for improve symptoms through exercise and activity progression     6  Return to driving      7  STEP OVER OBSTACLES  Take bean bag, step over 2in cones and golf club  Place on surface   In //bars x6         "

## 2023-05-02 ENCOUNTER — APPOINTMENT (OUTPATIENT)
Dept: PHYSICAL THERAPY | Facility: REHABILITATION | Age: 38
End: 2023-05-02

## 2023-05-08 ENCOUNTER — APPOINTMENT (OUTPATIENT)
Dept: PHYSICAL THERAPY | Facility: REHABILITATION | Age: 38
End: 2023-05-08

## 2023-05-12 ENCOUNTER — DOCUMENTATION (OUTPATIENT)
Dept: OTHER | Facility: HOSPITAL | Age: 38
End: 2023-05-12

## 2023-05-17 NOTE — PROGRESS NOTES
Patient completed week 8, remote visit for Monica, migraine research clinical trial  Patient was instructed to self administer 70 mg injection  (Administration site: thigh; package lot number [id number])  Patient was instructed to document injection into e-diary  Patient has no questions or concerns at this time

## 2023-05-17 NOTE — PROGRESS NOTES
Patient completed week 4, remote visit for Monica, migraine research clinical trial  Patient was instructed to self administer 70 mg injection  (Administration site: Arm, upper; package lot number [id number])  Patient was instructed to document injection into e-diary  Patient has no questions or concerns at this time

## 2023-05-30 ENCOUNTER — HOSPITAL ENCOUNTER (OUTPATIENT)
Dept: SLEEP CENTER | Facility: CLINIC | Age: 38
Discharge: HOME | End: 2023-05-30

## 2023-05-30 DIAGNOSIS — G47.00 INSOMNIA, UNSPECIFIED TYPE: ICD-10-CM

## 2023-05-30 DIAGNOSIS — G47.33 OBSTRUCTIVE SLEEP APNEA: ICD-10-CM

## 2023-06-01 NOTE — PROGRESS NOTES
Home Sleep Study Documentation    HOME STUDY DEVICE: Noxturnal no                                           Machelle G3 yes      Pre-Sleep Home Study:    Set-up and instructions performed by: Temitope De Anda 61    Technician performed demonstration for Patient: yes    Return demonstration performed by Patient: yes    Written instructions provided to Patient: yes    Patient signed consent form: yes        Post-Sleep Home Study:    Additional comments by Patient:     Home Sleep Study Failed:no:    Failure reason: N/A    Reported or Detected: N/A    Scored by: Marya Sethi

## 2023-06-05 DIAGNOSIS — G47.01 INSOMNIA DUE TO MEDICAL CONDITION: ICD-10-CM

## 2023-06-05 DIAGNOSIS — G47.33 OBSTRUCTIVE SLEEP APNEA: Primary | ICD-10-CM

## 2023-06-07 ENCOUNTER — DOCUMENTATION (OUTPATIENT)
Dept: OTHER | Facility: HOSPITAL | Age: 38
End: 2023-06-07

## 2023-06-07 NOTE — PROGRESS NOTES
Patient seen for week 12, migraine clinical trial, AMOVIG  Patient completed surveys in the e-diary  Patient has no complaints or questions at this time  Patient returned 2 empty syringes to the clinical research coordinator  2 new syringes were administered, 140 mg injection, KKD#[id number]  Patient was injected today, 6/7/23 in the upper left arm by clinical research coordinator

## 2023-06-13 ENCOUNTER — TELEPHONE (OUTPATIENT)
Dept: SLEEP CENTER | Facility: CLINIC | Age: 38
End: 2023-06-13

## 2023-06-13 NOTE — TELEPHONE ENCOUNTER
Call placed to patient  Left message to call the nursing staff to review study results  Home sleep study shows mild BUCKY  APAP ordered  Patient needs DME setup prior to 7/3/2023 to meet compliance follow-up with Jolynn Gosselin, CRNP 8/3/2023

## 2023-06-13 NOTE — TELEPHONE ENCOUNTER
----- Message from Shahbaz Matthews [place] sent at 6/5/2023  8:39 AM EDT -----  Mild obstructive sleep apnea was confirmed during the home sleep study and is likely contributing to symptoms  Recommend trial of auto-CPAP  A prescription for equipment has been provided  Patient to be scheduled for set up of equipment, followed by compliance follow up 31-91 days after set up

## 2023-06-16 NOTE — TELEPHONE ENCOUNTER
Returned call from patient  Advised of sleep study results and that APAP has been ordered  Patient currently without insurance and will need to pay out-of-pocket  Patient provided with [employer] representative number in the [facility] office to discuss costs  Patient to schedule DME setup with [facility] office staff if she opts to pursue a CPAP device  Has compliance follow-up with LYNDSEY Escalante 8/3/2023, which can remain if patient is set up prior to 7/3/2023

## 2023-07-05 ENCOUNTER — DOCUMENTATION (OUTPATIENT)
Dept: OTHER | Facility: HOSPITAL | Age: 38
End: 2023-07-05

## 2023-07-18 ENCOUNTER — TELEPHONE (OUTPATIENT)
Dept: NEUROLOGY | Facility: CLINIC | Age: 38
End: 2023-07-18

## 2023-07-18 NOTE — TELEPHONE ENCOUNTER
Called patient and left voicemail to confirm their upcoming appointment with Dr. Juan Culp. Informed patient about arriving in the [place] location 15 minutes prior to appointment to get checked in and go over chart.

## 2023-07-24 ENCOUNTER — OFFICE VISIT (OUTPATIENT)
Dept: NEUROLOGY | Facility: CLINIC | Age: 38
End: 2023-07-24

## 2023-07-24 VITALS
DIASTOLIC BLOOD PRESSURE: 79 MMHG | HEIGHT: 63 IN | WEIGHT: 261 LBS | BODY MASS INDEX: 46.25 KG/M2 | HEART RATE: 69 BPM | SYSTOLIC BLOOD PRESSURE: 125 MMHG

## 2023-07-24 DIAGNOSIS — R51.9 CHRONIC DAILY HEADACHE: ICD-10-CM

## 2023-07-24 DIAGNOSIS — G93.2 IIH (IDIOPATHIC INTRACRANIAL HYPERTENSION): Primary | ICD-10-CM

## 2023-07-24 DIAGNOSIS — G43.009 MIGRAINE WITHOUT AURA AND WITHOUT STATUS MIGRAINOSUS, NOT INTRACTABLE: ICD-10-CM

## 2023-07-24 PROCEDURE — 99214 OFFICE O/P EST MOD 30 MIN: CPT | Performed by: PSYCHIATRY & NEUROLOGY

## 2023-07-24 NOTE — PATIENT INSTRUCTIONS
Follow up with eye doctor for dilated eye exam and please let me know if they see any signs of papilledema or swelling behind your eyes and certainly go to the ER if you have any acute vision changes for lumbar puncture which could decrease pressure right away. We discussed I am not officially diagnosing you with idiopathic intracranial hypertension however you do have some subtle findings on imaging that could suggest this may be slightly elevated due to the untreated sleep apnea. If you ever wanted we could add acetazolamide/Diamox at any time to treat, get a lumbar puncture outpatient any time to treat or further work up, and do not trust that the eye doctor will send me the report. - consider reading or listening to the book "The body keeps the score" - interesting book on how PTSD and stress can impact the body and cause physical symptoms    - "Rewire Your Anxious Brain: How to Use the Neuroscience of Fear to End Anxiety, Panic, and Worry" By Demarco Walker PhD    Headache/migraine treatment:   Abortive medications (for immediate treatment of a headache): It is ok to take ibuprofen, acetaminophen or naproxen (Advil, Tylenol,  Aleve, Excedrin) if they help your headaches you should limit these to No more than 3 times a week to avoid medication overuse/rebound headaches. For your more moderate to severe migraines take this medication early   Maxalt (rizatriptan) 10mg tabs - take one at the onset of headache. May repeat one time after 1-2 hours if pain has not resolved. (Max 2 a day and 9 a month)       Prescription preventive medications for headaches/migraines   (to take every day to help prevent headaches - not to take at the time of headache):  [x]Aimovig 1 shot monthly through the study     If needed there is a coupon card for the copay at [facility]. com     READ INSTRUCTIONS that come with the medication. REFRIGERATE. Keep out of direct sunlight.  Prior to administration, allow to come to room temperature for 30 minutes. Do not warm using a heat source (eg, microwave or hot water). Do not shake. Administer in preferably abdomen (avoiding 2 inches around the navel), thigh, upper arm, or buttocks avoiding areas of skin that are tender, bruised, red or hard. Deliver entire contents of single-use prefilled pen or syringe. Unknown impact in pregnancy therefore would recommend stopping 6 months prior to considering pregnancy. I suggest trial of lower dose diamox than we typically use in more severe cases -  -     acetaZOLAMIDE (DIAMOX) 125 mg tablet;   125 mg  in am and in pm for 1 week,   then 125 mg in am and 250 mg in pm for 1 week,   then 250 mg in am and in pm    If we ever were to start this medication and if at any point you felt like 3 to 5 hours after taking a dose you felt much worse with your symptoms then it is more likely that the medication is wearing off rather than it being a side effect of the medication and we could add a 125 to 250 mg dose midday if needed. -If you had papilledema or swelling behind your eyes we would rapidly get you up to 500 mg twice a day and may need to go higher. I have 1 patient who is up to 3000 mg in a day just for reference and I will be starting you on 250 mg.      - if a lower dose is helpful, can stay lower, if higher dose causes side effects, go back to last tolerated dose. If you get all the way up to the dose recommended and is not helping enough let me know as I will absolutely go higher.    - make sure to stay hydrated while on this as can cause dehydration since that is it's purpose to take fluid off.   - most common side effect is tingling of the nerves at times, makes carbonated drinks taste flat   - can cause electrolyte disturbances, but not typically in any significant way.  However, be cautious if taking with other meds that lower potassium like hydrochlorothiazide etc      *Typically these types of medications take time untill you see the benefit, although some may see improvement in days, often it may take weeks, especially if the medication is being titrated up to a beneficial level. Please contact us if there are any concerns or questions regarding the medication. Lifestyle Recommendations:  [x] SLEEP - Maintain a regular sleep schedule: Adults need at least 7-8 hours of uninterrupted a night. Maintain good sleep hygiene:  Going to bed and waking up at consistent times, avoiding excessive daytime naps, avoiding caffeinated beverages in the evening, avoid excessive stimulation in the evening and generally using bed primarily for sleeping. One hour before bedtime would recommend turning lights down lower, decreasing your activity (may read quietly, listen to music at a low volume). When you get into bed, should eliminate all technology (no texting, emailing, playing with your phone, iPad or tablet in bed). [x] HYDRATION - Maintain good hydration. Drink  2L of fluid a day (4 typical small water bottles)  [x] DIET - Maintain good nutrition. In particular don't skip meals and try and eat healthy balanced meals regularly. [x] TRIGGERS - Look for other triggers and avoid them: Limit caffeine to 1-2 cups a day or less. Avoid dietary triggers that you have noticed bring on your headaches (this could include aged cheese, peanuts, MSG, aspartame and nitrates). [x] EXERCISE - physical exercise as we all know is good for you in many ways, and not only is good for your heart, but also is beneficial for your mental health, cognitive health and  chronic pain/headaches. I would encourage at the least 5 days of physical exercise weekly for at least 30 minutes. Education and Follow-up  [x] Please call with any questions or concerns. Of course if any new concerning symptoms go to the emergency department.   [x] Follow up 3-4 months, sooner if needed

## 2023-07-24 NOTE — PROGRESS NOTES
[facility] Consult - Follow up   PATIENT:  Garrett Kay  MRN:  [id number]  :  1985  DATE OF SERVICE:  2023  REFERRED BY: No ref. provider found  PMD: Monica Cline MD    Assessment/Plan:   Garrett Kay is a very pleasant  43 y.o. female with a past medical history that includes migraine, PTSD, BMI over 45, vertigo, B12 deficiency, vitamin-D deficiency, insomnia, anxiety, depression, mild degenerative disc disease C4-5 referred here for evaluation of headache. My initial evaluation 3/29/2022     Chronic daily headache  Migraine without aura and without status migrainosus, not intractable  Post-traumatic stress disorder  History of possible concussion - resolved  Suspected idiopathic intracranial hypertension based on imaging without known papilledema  Home sleep study 2023, SOPHIE 8.5, O2 down to 85%, and not yet treating due to cant afford to treat  We discussed her history of multiple possible concussions although we discussed it is also difficult to diagnose concussion definitively and how posttraumatic headache with migrainous features can often have similar presentation. Please see EMR and HPI for details. Most recently on 2022, she was seated on a couch when the back of the couch collapsed causing her to fall and hit the right side of her head on an end table she thinks, does not remember hitting her head, but had a red conchis at the right cheek area. She reports she does not remember a few seconds of time which we discussed can happen as a stress reaction are related to concussion and had progressively worsening up chronic vertigo and posttraumatic headache with migrainous features. She was evaluated emergency department where noncontrast head CT was unremarkable for acute pathology and she subsequently followed up with primary care provider who kept her out of work and asked her to follow up with Neurology.   - As of 2022:  She reports following the injury vertigo actually got much better, she has chronic daily back on headaches for years dating back to at least 03/2020 that is worse about 1 day a week for which she is not interested in prescription preventative, did start trial of rizatriptan for abortive. She also has history of anxiety, depression and some symptoms of posttraumatic stress with functional neurologic disorder with hyper startle response, avoidance behavior and wearing sunglasses indoors, intrusion symptoms, negative impact on cognition and mood and arousal and reactivity changes. Also some symptoms of deconditioning and maladaptive coping. We discussed continue follow-up with PCP and recommended establishing care with a counselor for which I will have our  see if she can help her with a list.  She was reassured by this information and we discussed gradual return to normalcy. Referral to sleep medicine for possible sleep apnea contributing to symptoms. - as of 5/6/2022: She reports she continues to have near daily headaches that are mild about 3/10 and worse about 2 times in the past month for which she did not yet try rizatriptan, however did go to ED today instead and sumatriptan plus IV fluids and Zofran helped. She is still working on following up with Sleep Medicine, eye doctor, counseling. She is now interested in prescription preventative and will start trial of amitriptyline with gradual titration up and again recommended taking rizatriptan next migraine.  - as of 8/19/2022: She reports she is doing a lot better, continues to have daily headaches that are about 3/10, but reports improvement on amitriptyline 50 mg which she started end of June, about 6 weeks ago. None more than a 5/10 in 2 months, no longer having daily migraines, so hasnt needed rizatriptan. She is continuing to re-expose herself to TV, screens etc slowly. - as of 1/10/2023: Follow-up with [facility] Carrillo on topiramate 25 mg / 50 mg. Continues to have PT for dizziness. - as of 4/10/2023: Since last visit tried and failed amitriptyline although it helped with migraine she had side effects. She stopped topiramate since last visit once she started Aimovig. She reports improvement since last visit on aimovig monthly  thus far after one dose with reduction in migraines. (On it through the pharmaceutical trial). Trying to get out more which is exhausting. Saw sleep medicine last month and home sleep study ordered out-of-pocket. Mood overall is better and gave some more resources. - as of 7/24/2023: She reports doing significantly better on Aimovig and the frequency has gone down to at most once a week and severity has gone down to where she finally feels functional.  Breakthrough maybe once a week she takes ibuprofen and it helps bring the pain down again. Baseline 1 out of 10 and we discussed on retrospective review I do see some findings consistent with IIH and at any time if she would like could consider lumbar puncture and addition of acetazolamide/Diamox which she would prefer to hold off at this time although we discussed I will be certainly more forceful if she does have progressive vision changes at her next eye exam as I suspect it could be due to untreated sleep apnea contributing to the IIH. Workup:  - as of initial visit 03/29/2022 Neurologic assessment reveals normal neurological exam except for depressed mood and affect, tearful, sunglasses indoors, hyper startle response to light touch, allodynia bifrontal forehead   - noncontrast head CT 03/04/2020: No acute intracranial abnormality.  - CTA head and neck with without contrast 11/02/2021: Normal CT angiogram of the head and neck. - MRI brain IAC with without contrast 2/9/22: No IAC or CP angle pathology. Unremarkable MRI of the brain. *We discussed in my retrospective review as of 7/24/2023 she does have some subtle findings of possible increased pressure in the brain including partially empty sella, some pallor of the right optic nerve, could call them slightly tortuous others may find them none tortuous. - noncontrast head CT 03/17/2022: No acute intracranial abnormality. - noncontrast head CT 05/06/2022: No acute intracranial abnormality. -CTA head and neck 05/06/2022: Negative CTA head and neck for large vessel occlusion, dissection, aneurysm, or high-grade stenosis. Preventative:  - we discussed headache hygiene and lifestyle factors that may improve headaches  -  aimovig monthly through the study. Discussed proper use, possible side effects and risks. - Past/ failed/contraindicated: Amitriptyline, topiramate  - future options: Alternative CGRP med    Abortive:  - discussed not taking over-the-counter or prescription pain medications more than 3 days per week to prevent medication overuse/rebound headache  -     rizatriptan (MAXALT) 10 MG tablet; Take 1 tablet (10 mg total) by mouth once as needed for migraine May repeat in 2 hours if needed. Max 2/24 hours, 9/month. Discussed proper use, possible side effects and risks. - Currently on through other providers: back up: steroids on hand if needed for back up, Ibuprofen, meclizine  - Past/ failed/contraindicated:  OTC meds  - past:  Has tolerated steroids in the past, migraine cocktails have brought pain down, sumatriptan in ED helped with IV fluids and Zofran  - future options:  prochlorperazine, Toradol IM or p.o., could consider trial for 5 days of Depakote or dexamethasone for prolonged migraine, ubrelvy, reyvow, nurtec    We have discussed concussions and the natural course of recovery. We have discussed that symptoms from a concussion typically take 1-2 weeks to resolve, and although sometimes it can feel like concussion symptoms linger on, at this point these symptoms would be more likely related to contributing factors.     - Contributing factors may include:   Post traumatic stress, Prolonged removal from normal routine,  posttraumatic headache,  comorbid injuries, preexisting chronic headaches or migraines,  medication overuse headache, anxiety or depression, stress, deconditioning,  comorbid medical diagnoses  - I have recommended gradual return of normal cognitive and physical activity with safety precautions  - We discussed that newer research regarding concussion shows that the sooner one returns gradually to their normal physical and cognitive routine, the sooner one tends to recover. Prolonged removal from normal routine and deconditioning have been shown to prolong symptoms and worsen symptoms.  - We discussed that sometimes there is a constellation of symptoms that some refer to as "post concussion syndrome," but I prefer not to use this term since that can be misleading and make people think they are still brain injured or "concussed," when the most common and likely etiology this far out from the head trauma is either contributing factors or a form of functional neurologic disorder with mixed symptoms  - We discussed how cognitive issues can have multiple causes and often related to multifactorial etiologies including stress, anxiety,  mood, pain, hypervigilance  and sleep issues and provided reassurance that, it is not likely the cognitive dysfunction is related to concussion at this point.   - Safe driving precautions, should not drive at all if feeling sleepy or cognitively not well. * We discussed the most likely therapy to help in these cases would be counseling    Insomnia, snoring, concern for BU[place]  -     Ambulatory referral to Sleep Medicine; Future    Patient instructions        Follow up with eye doctor for dilated eye exam and please let me know if they see any signs of papilledema or swelling behind your eyes and certainly go to the ER if you have any acute vision changes for lumbar puncture which could decrease pressure right away.   We discussed I am not officially diagnosing you with idiopathic intracranial hypertension however you do have some subtle findings on imaging that could suggest this may be slightly elevated due to the untreated sleep apnea. If you ever wanted we could add acetazolamide/Diamox at any time to treat, get a lumbar puncture outpatient any time to treat or further work up, and do not trust that the eye doctor will send me the report. - consider reading or listening to the book "The body keeps the score" - interesting book on how PTSD and stress can impact the body and cause physical symptoms    - "Rewire Your Anxious Brain: How to Use the Neuroscience of Fear to End Anxiety, Panic, and Worry" By Siria Figueroa PhD    Headache/migraine treatment:   Abortive medications (for immediate treatment of a headache): It is ok to take ibuprofen, acetaminophen or naproxen (Advil, Tylenol,  Aleve, Excedrin) if they help your headaches you should limit these to No more than 3 times a week to avoid medication overuse/rebound headaches. For your more moderate to severe migraines take this medication early   Maxalt (rizatriptan) 10mg tabs - take one at the onset of headache. May repeat one time after 1-2 hours if pain has not resolved. (Max 2 a day and 9 a month)       Prescription preventive medications for headaches/migraines   (to take every day to help prevent headaches - not to take at the time of headache):  [x]Aimovig 1 shot monthly through the study     If needed there is a coupon card for the copay at [facility]. com     READ INSTRUCTIONS that come with the medication. REFRIGERATE. Keep out of direct sunlight. Prior to administration, allow to come to room temperature for 30 minutes. Do not warm using a heat source (eg, microwave or hot water). Do not shake. Administer in preferably abdomen (avoiding 2 inches around the navel), thigh, upper arm, or buttocks avoiding areas of skin that are tender, bruised, red or hard.  Deliver entire contents of single-use prefilled pen or syringe. Unknown impact in pregnancy therefore would recommend stopping 6 months prior to considering pregnancy. I suggest trial of lower dose diamox than we typically use in more severe cases -  -     acetaZOLAMIDE (DIAMOX) 125 mg tablet;   125 mg  in am and in pm for 1 week,   then 125 mg in am and 250 mg in pm for 1 week,   then 250 mg in am and in pm    If we ever were to start this medication and if at any point you felt like 3 to 5 hours after taking a dose you felt much worse with your symptoms then it is more likely that the medication is wearing off rather than it being a side effect of the medication and we could add a 125 to 250 mg dose midday if needed. -If you had papilledema or swelling behind your eyes we would rapidly get you up to 500 mg twice a day and may need to go higher. I have 1 patient who is up to 3000 mg in a day just for reference and I will be starting you on 250 mg.      - if a lower dose is helpful, can stay lower, if higher dose causes side effects, go back to last tolerated dose. If you get all the way up to the dose recommended and is not helping enough let me know as I will absolutely go higher.    - make sure to stay hydrated while on this as can cause dehydration since that is it's purpose to take fluid off.   - most common side effect is tingling of the nerves at times, makes carbonated drinks taste flat   - can cause electrolyte disturbances, but not typically in any significant way. However, be cautious if taking with other meds that lower potassium like hydrochlorothiazide etc      *Typically these types of medications take time untill you see the benefit, although some may see improvement in days, often it may take weeks, especially if the medication is being titrated up to a beneficial level. Please contact us if there are any concerns or questions regarding the medication.      Lifestyle Recommendations:  [x] SLEEP - Maintain a regular sleep schedule: Adults need at least 7-8 hours of uninterrupted a night. Maintain good sleep hygiene:  Going to bed and waking up at consistent times, avoiding excessive daytime naps, avoiding caffeinated beverages in the evening, avoid excessive stimulation in the evening and generally using bed primarily for sleeping. One hour before bedtime would recommend turning lights down lower, decreasing your activity (may read quietly, listen to music at a low volume). When you get into bed, should eliminate all technology (no texting, emailing, playing with your phone, iPad or tablet in bed). [x] HYDRATION - Maintain good hydration. Drink  2L of fluid a day (4 typical small water bottles)  [x] DIET - Maintain good nutrition. In particular don't skip meals and try and eat healthy balanced meals regularly. [x] TRIGGERS - Look for other triggers and avoid them: Limit caffeine to 1-2 cups a day or less. Avoid dietary triggers that you have noticed bring on your headaches (this could include aged cheese, peanuts, MSG, aspartame and nitrates). [x] EXERCISE - physical exercise as we all know is good for you in many ways, and not only is good for your heart, but also is beneficial for your mental health, cognitive health and  chronic pain/headaches. I would encourage at the least 5 days of physical exercise weekly for at least 30 minutes. Education and Follow-up  [x] Please call with any questions or concerns. Of course if any new concerning symptoms go to the emergency department. [x] Follow up 3-4 months, sooner if needed       CC: We had the pleasure of evaluating Yared Dickinson in neurological consultation today. Yared Dickinson is a   left  handed female who presents today for evaluation of headaches. History obtained from patient as well as available medical record review.   History of Present Illness:   Interval history as of 7/24/2023  - no significant new or concerning neurologic symptoms since last visit   -Mood- did have some more headaches in the past month as both friend and grandfather   - eye doctor years ago - wears glasses, looking to get another one     Headaches and migraines   She reports doing well and improving on the shots  Frequency has gone down to at most once a week and severity has gone down to where she finally feels functional  Breakthroughs maybe once a week and she takes ibuprofen for that and it helps bring it down  To being able to function again like a 3/10.   Baseline is a 1 out of 10 daily    Preventative:   - Aimovig 1 shot monthly through the study     Abortive:   -Not needing rizatriptan much - expensive and ibuprofen helps   Denies bothersome side effects     Home sleep study 2023, SOPHIE 8.5, O2 down to 85%, and not yet treating due to cant afford to treat  Starts on back settle in and then to fall asleep goes to one side or other        Interval history as of 4/10/2023  - no significant new or concerning neurologic symptoms since last visit   -She has been doing exposure therapy for PTSD and screen time  - Followed up with [facility] Rubio 1/10/2023  - trying to go out more and its exhausting   - sleep medicine 3/16/23 and home study ordered   - mood - a little discouraged when the dizziness worsened but staying positive   - no vision changes lately   - not in with counselor     Headaches and migraines   She reports after the first week of  no migraines and the baseline was better  Two weeks after first shot dizzy all day   Now that shot is wearing off migraine last weekend and baseline headache and dizziness improving  Classic migraines - last sat first in 2.5 weeks and the rest of the headaches that are milder -5/10     Preventative:   - trial of aimovig through the trial - first shot last month 3/14/23 and next shot 23  -Amitriptyline 50 mg nightly-she called in 10/14/2022 sick to her stomach and not taking amitriptyline for the past couple of days and having trouble sleeping the past 4 nights which would not be surprising if this medication is stopped suddenly. Even on amitriptyline only 2 to 4 hours of sleep. Amitriptyline was helping decrease the number of migraines, but wanted to try something else and topiramate was sent   - Trial of topiramate with gradual titration up to 50 mg twice daily and was helping with the severity of the migraines but the baseline number of headaches and intensity was getting worse/background 1/10 - up to 4/10 and started weaning off two weeks ago or so    Abortive:   - rizatriptan as needed and has not needed in a while, sometime in Feb  Denies bothersome side effects   - through PCP - not tried steroids prescribed by PCP 11/4/22      Interval history as of 1/10/2023  Is attempting to get insurance coverage but does not have any at this time     - has not gone to sleep medicine  - has not followed up with eye due to cost  - vertigo has improved with PT. No longer taking medication for this     Goes to bed at 1030 pm  Wakes up at 830 am  Wakes up multiple times 3-4+ a night  Sleep is not restful     - Continues to be out of work, no job, is going to start looking for [employer]     Mild headaches last 20 minutes to a few days if she doesn't take her medication (ibuprofen), if she takes ibuprofen will last 20 minutes   Migraines last 3-4 hours with the rizatriptan.   Only had to repeat 1 time in the past 2 months    Interval history as of 8/19/2022  - denies any new or concerning neurologic symptoms since last visit   - Saw [facility] Rodriguez 6/28/22   - Vertigo has improved and still goes to PT once a week, no longer takes meclizine as it was not helping  - Was referred to sleep medicine but she has no insurance currently and was unable to make an appointment with them due to cost  - Sleep is worse than previous, now sleeping 2 hours and then waking up, used to have more problems falling asleep but now the problem is staying asleep  - Continues to be out of work, no job   - Has not been able to follow up with eye doctor due to cost  - No ED visits since May  - Is working on exposure therapy every day but doesn't feel like she is making improvements currently and has been stuck trying to improve her time looking at screens, can only tolerate an hour of TV or 20 mins of computer    Headaches and migraines   - baseline headache is now a 3/10 from a 1/10 last time  - has not had a headache that is >5/10 in more than 2 months  Preventative: amitriptyline now up to 50 mg HS - just started it late June   Abortive: Maxalt (has not had to use it at all)  Denies bothersome side effects        Interval history as of 5/6/2022  - PCP is managing return to work, she is going back to work on Monday   -she had VNG which showed some sort of peripheral abnormality on the left contributing to her vertigo and is back with vestibular therapy  - she continues to struggle with deconditioning to lights and activities  -she has not yet followed up with counselor as recommended although  did offer list of counselors covered by her insurance, but has someone at PCP office that she is looking into   - She has not yet followed up with Sleep Medicine is recommended  - has not yet seen eye doctor for dilated eye exam, planning for next month     Headaches and migraines   She had she continues to have chronic near daily headaches that are typically mild at about 3/10, but only one significant migraine 6/10 yesterday where she took aleve and today was worse and went with ED. She went to the emergency department this morning due to migraine with diplopia, did not try rizatriptan and had improvement with sumatriptan a migraine cocktail with significant improvement in symptoms. She in fact saw Neurology while in the emergency department and CTA head and neck and CT head were performed. Has mostly not been taking any medications for headaches. Headache was worse yesterday and she had a naproxen in early afternoon.  She has not been taking maxalt at home. Has been working on exposure therapy at home with light and screens and is only up to moderate tolerance. Preventative: nothing   Abortive: had not tried Rizatriptan yet      History as of our initial visit 03/29/2022: History of concussion  - 9th grade. States that she hit her head during indoor soccer and perhaps had a few seconds of LOC vs anmesia. - Most of 20s did not have medical insurance and had some hits to the head   - 2/29/2020 hit her head on her car. Didn't have LOC. Headaches, motion and light sensitivity started 20 minutes after. Next time lost balance   - 3/4/2020 when she was stepping out of her car, loss balance, did not hit head. Went to ER and then did therapy afterwards. Quit her job after this.     - Patient started a job in August 2021 after being out of work for 1.5 years. She feels like this was over stimulating for her. She felt noise and light were overwhelming for her.     - November of 2021 patient presented to her family doctor with a severe headache that was unrelenting for 1 and half weeks. Patient had stabbing on the left side of her head. dizziness and felt unsteady. She was sent to the emergency room for evaluation via ambulance. CTA of the head and neck was done in the emergency room which was negative   - Since the ER, states that she developed dizziness 1 week before [place] out of nowhere. working from home since 12/27/2021 due to her dizziness. - Does wear her sunglasses frequently during the day indoors. 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   States at home uses less, however she did place them on 2 times during our visit  - She has followed with Sports Medicine, physical therapy, primary care, saw my neurology colleague [facility] Guallpa 02/02/2022  - went back to work after visit [facility] Guallpa, was working from home until episode 3/17/22    On 03/17/2022 she was seated on a couch when the back of the couch collapsed causing her to fall and hit the right side of her head on an end table she thinks, does not remember hitting her head, but had a red conchis at the right cheek area  Acute symptoms included:  Does not remember a few seconds of time, unknown if LOC as unwitnessed, progressively worsening vertigo/room spinning and generalized headache with photophobia and phonophobia and nausea    - as of 3/29/2022: she continues to have chronic photophobia, migraines, but vertigo after the incident actually has now been better than prior     Mood:   - anxiety and depression in the past, worse later due to medical conditions   - post traumatic stress symptoms - hypervigilance, impact on cognition and mood, lack of emotional filter  Things at work are bad, HR said if no emotional filter you should not come back   Never worked with a counselor   No SI    Work  8-4  M-F  Out the past 2 weeks      Headaches started at what age? 20 years old  How often do the headaches occur?   - as of 3/29/2022: chronic daily background headaches for years since at least 3/2020, worse when vertigo kicked in Dec 2021 and 1 day a week above 3/10    Aura? without aura      Last eye exam: eye sight improving, followed a long time ago, 7 years ago     Where is your headache located and pain quality?   - bifrontal pressure like an iron band across forehead is most common and radiates back across the top of the scalp until bioccipital   - ice pick left parietal   What is the intensity of pain?  Average: 1/10, worst 7/10  Associated symptoms:   [x] Nausea       [] Vomiting      [x] Photophobia     [x]Phonophobia      [] Osmophobia  [] Blurred vision   [x] Light-headed or dizzy - chronic     [] Tinnitus - not for prolonged periods and not severe  [] Hands or feet tingle or feel numb/paresthesias    [] Ptosis      [] Facial droop  [] Lacrimation  [] Nasal congestion/rhinorrhea Things that make the headache worse? No specific movements, sometimes bending over    Headache triggers:  Lights, can be worse in am, stress, weather changes, unsure if hormonal     Have you seen someone else for headaches or pain? Yes many providers   Are you current pregnant or planning on getting pregnant? No plans, not active   Have you ever had any Brain imaging? yes    What medications do you take or have you taken for your headaches?    ABORTIVE:      Ibuprofen dulls it  Meclizine    Past  Something as needed in the past   Steroids in the past   Migraine cocktails in ED have helped bring pain down     PREVENTIVE:   -    MVI     Past/ failed/contraindicated:  -      LIFESTYLE  Sleep   - averages: 6-8 hours, snores, never had a sleep study  Problems falling asleep?:   Yes  Problems staying asleep?:  Yes, 1-4 for unknown reasons      Water: 6 cups per day  Caffeine: usually 0-1 soda per day      The following portions of the patient's history were reviewed and updated as appropriate: allergies, current medications, past family history, past medical history, past social history, past surgical history and problem list.    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  Lives with cat    Past Medical History:     Past Medical History:   Diagnosis Date   • Concussion    • Edema of left lower extremity 7/12/2021   • TBI (traumatic brain injury) ([address])     [id number] stated by patient       Patient Active Problem List   Diagnosis   • Obesity   • Headache   • Vertigo   • Worsening headaches   • B12 deficiency   • Vitamin D deficiency   • Snoring   • Post-traumatic stress   • Chronic post-traumatic headache   • Migraine   • 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 Max 2/24 hours, 9/month. 9 tablet 11     No current facility-administered medications for this visit.         Allergies:    No Known Allergies    Family History:     Family History   Problem Relation Age of Onset   • Cancer Mother    • Heart disease Father        Social History:     Social History     Socioeconomic History   • Marital status: Single     Spouse name: Not on file   • Number of children: Not on file   • Years of education: Not on file   • Highest education level: Not on file   Occupational History   • Not on file   Tobacco Use   • Smoking status: Never   • Smokeless tobacco: Never   Vaping Use   • Vaping Use: Never used   Substance and Sexual Activity   • Alcohol use: Not Currently   • Drug use: Never   • Sexual activity: Not on file   Other Topics Concern   • Not on file   Social History Narrative    CONSUMES 1 SODA PER DAY     Social Determinants of Health     Financial Resource Strain: Not on file   Food Insecurity: Not on file   Transportation Needs: Not on file   Physical Activity: Not on file   Stress: Not on file   Social Connections: Not on file   Intimate Partner Violence: Not on file   Housing Stability: Not on file         Objective:       Physical Exam:                                                                 Vitals:            Constitutional:    /79 (BP Location: Right arm, Patient Position: Sitting, Cuff Size: Standard)   Pulse 69   Ht 5' 3" (1.6 m)   Wt 118 kg (261 lb) BMI 46.23 kg/m²   BP Readings from Last 3 Encounters:   07/24/23 125/79   04/10/23 112/70   03/16/23 123/73     Pulse Readings from Last 3 Encounters:   07/24/23 69   04/10/23 76   03/16/23 66         Well developed, well nourished, well groomed. Psychiatric:  Normal behavior and appropriate affect        Neurological Examination:     Mental status/cognitive function:   Recent and remote memory appear intact. Attention span and concentration as well as fund of knowledge are appropriate for age. Normal language and spontaneous speech. Cranial Nerves:   VII-facial expression symmetric  Motor Exam: symmetric bulk throughout. no atrophy, fasciculations or abnormal movements noted. Coordination:  no apparent dysmetria, ataxia or tremor noted  Gait: steady casual gait         Pertinent lab results:   - routine labs ordered 02/08/2022 and not yet obtained  11/02/2021 CMP and CBC unremarkable  TSH normal     EKG 05/06/2022 sinus rhythm with sinus arrhythmia, normal EKG, rate 74,   EKG 5/15/2014,  normal sinus rhythm, rate 80,      Imaging: I have personally reviewed imaging and radiology read   - noncontrast head CT 03/04/2020: No acute intracranial abnormality.  - CTA head and neck with without contrast 11/02/2021: Normal CT angiogram of the head and neck. - MRI brain IAC with without contrast 2/9/22: No IAC or CP angle pathology.  Unremarkable MRI of the brain. - noncontrast head CT 03/17/2022: No acute intracranial abnormality. Review of Systems:   ROS obtained by medical assistant Personally reviewed and updated if indicated. I recommended PCP follow up for non neurologic problems. Review of Systems   Constitutional: Negative for appetite change and fever. HENT: Negative. Negative for hearing loss, tinnitus, trouble swallowing and voice change. Eyes: Negative. Negative for photophobia and pain. Respiratory: Negative. Negative for shortness of breath.     Cardiovascular: Negative. Negative for palpitations. Gastrointestinal: Negative. Negative for nausea and vomiting. Endocrine: Negative. Negative for cold intolerance. Genitourinary: Negative. Negative for dysuria, frequency and urgency. Musculoskeletal: Negative. Negative for myalgias and neck pain. Skin: Negative. Negative for rash. Neurological: Positive for dizziness and headaches. Negative for tremors, seizures, syncope, facial asymmetry, speech difficulty, weakness, light-headedness and numbness. Hematological: Negative. Does not bruise/bleed easily. Psychiatric/Behavioral: Positive for sleep disturbance. Negative for confusion and hallucinations. All other systems reviewed and are negative. I have spent 26 minutes with Patient  today in which greater than 50% of this time was spent in counseling/coordination of care regarding Diagnostic results, Prognosis, Risks and benefits of tx options, Instructions for management, Patient and family education, Importance of tx compliance, Risk factor reductions, Impressions, Counseling / Coordination of care, Documenting in the medical record, Reviewing / ordering tests, medicine, procedures   and Obtaining or reviewing history  . I also spent 12 minutes non face to face for this patient the same day.        Author:  Teresa Hansen MD 7/24/2023 3:27 PM

## 2023-07-24 NOTE — PROGRESS NOTES
Review of Systems   Constitutional: Negative for appetite change and fever. HENT: Negative. Negative for hearing loss, tinnitus, trouble swallowing and voice change. Eyes: Negative. Negative for photophobia and pain. Respiratory: Negative. Negative for shortness of breath. Cardiovascular: Negative. Negative for palpitations. Gastrointestinal: Negative. Negative for nausea and vomiting. Endocrine: Negative. Negative for cold intolerance. Genitourinary: Negative. Negative for dysuria, frequency and urgency. Musculoskeletal: Negative. Negative for myalgias and neck pain. Skin: Negative. Negative for rash. Neurological: Positive for dizziness and headaches. Negative for tremors, seizures, syncope, facial asymmetry, speech difficulty, weakness, light-headedness and numbness. Hematological: Negative. Does not bruise/bleed easily. Psychiatric/Behavioral: Positive for sleep disturbance. Negative for confusion and hallucinations. All other systems reviewed and are negative.

## 2023-07-26 ENCOUNTER — TELEPHONE (OUTPATIENT)
Dept: SLEEP CENTER | Facility: CLINIC | Age: 38
End: 2023-07-26

## 2023-08-04 ENCOUNTER — DOCUMENTATION (OUTPATIENT)
Dept: OTHER | Facility: HOSPITAL | Age: 38
End: 2023-08-04

## 2023-08-04 NOTE — PROGRESS NOTES
Spoke with  Dung Arcos via telephone 8/2//2023 for remote/Week 20 study visit for the [employer] Migraine Research Study. Aldair Zoë confirmed dosing @ 11:00 8/2/2023 in left thigh, using second pen from lot #[id number].   No new AE's, dosing or device complaints to report.  Final study related visit will be in person and will take place with Monica Ayers and myself on 8/24/2023 @ 3 p.m.

## 2023-08-14 ENCOUNTER — OFFICE VISIT (OUTPATIENT)
Dept: SLEEP CENTER | Facility: CLINIC | Age: 38
End: 2023-08-14

## 2023-08-14 VITALS
BODY MASS INDEX: 47.84 KG/M2 | DIASTOLIC BLOOD PRESSURE: 83 MMHG | HEIGHT: 63 IN | WEIGHT: 270 LBS | SYSTOLIC BLOOD PRESSURE: 124 MMHG

## 2023-08-14 DIAGNOSIS — G47.33 OSA (OBSTRUCTIVE SLEEP APNEA): Primary | ICD-10-CM

## 2023-08-14 DIAGNOSIS — E66.01 CLASS 3 SEVERE OBESITY DUE TO EXCESS CALORIES WITHOUT SERIOUS COMORBIDITY WITH BODY MASS INDEX (BMI) OF 45.0 TO 49.9 IN ADULT (HCC): ICD-10-CM

## 2023-08-14 DIAGNOSIS — F51.04 CHRONIC INSOMNIA: ICD-10-CM

## 2023-08-14 DIAGNOSIS — G47.21 CIRCADIAN RHYTHM SLEEP DISORDER, DELAYED SLEEP PHASE TYPE: ICD-10-CM

## 2023-08-14 PROCEDURE — 99214 OFFICE O/P EST MOD 30 MIN: CPT | Performed by: INTERNAL MEDICINE

## 2023-08-14 NOTE — PROGRESS NOTES
Progress Note - Sleep Medicine  Romy Tyler 43 y.o. female MRN: [id number]       Impression & Plan:     Romy Tyler is a 69-year-old female with past medical history of migraines and traumatic brain injury who presents for follow-up of obstructive sleep apnea. 1.  Mild obstructive sleep apnea  Home sleep study on 5/30/2023 showed mild obstructive sleep apnea with an SOPHIE of 8.5, oxygen saturation reji of 85%. Signs and symptoms include snoring, excessive tiredness and daytime sleepiness  [place] score of 6  She is unable to afford CPAP at this time due to lack of health insurance  She would like to try CPAP after she gets health insurance  Due to history of traumatic brain injury there is concern for central apnea but no central apneas were seen on home study  Home sleep studies were not as accurate in diagnosing central apneas    Plan  Follow-up in 3 months   Order CPAP at that time    2. Chronic insomnia  Sleep onset and maintenance insomnia  Patient has poor sleep hygiene as the cat wakes her up every morning at 6 AM despite her going to bed at 2 to 4 AM  She sleeps with a cat in her room  Patient goes to bed at midnight and falls asleep within 2 to 4 hours    Plan  Discussed CBT-I but patient does not have health insurance and would like to wait until she does  Provided 2-week sleep diary  We will consider medications such as melatonin at next visit  Follow-up in 3 months    3. Delayed sleep-wake phase disorder  Patient states that she has always been a "night owl"  She would prefer to sleep from midnight until 9 AM if not later  Provided 2-week sleep diary  Will consider light therapy/melatonin at next visit    4.   Obesity  Counseled patient on lifestyle modifications including diet and exercise  Consider referral to weight management clinic at next visit    ______________________________________________________________________    HPI:    Romy Tyler is a 69-year-old female with past medical history of migraines and traumatic brain injury who presents for follow-up of obstructive sleep apnea. She was initially referred by neurology due to increasing frequency of headaches. She has had multiple brain injuries according to her. She is snoring with excessive tiredness and daytime sleepiness. She has an [place] score of 6. She has a hard time falling asleep and staying asleep. She generally goes to bed at midnight. It takes her 2 to 4 hours to fall asleep. She subsequently wakes up at 6 AM because of her cat. If she is not woken up by her cat she has a 9:30 AM alarm. She was recently diagnosed with mild obstructive sleep apnea via home sleep study. SOPHIE of 8.5. However she does not have medical insurance at this time and was unable to start CPAP. She would like to start once her health insurance situation is sorted out. Patient states that she has always been a "night owl" she generally goes to bed at midnight and sleeps until 9:30 AM.    She drinks 1 can of Coke at lunchtime. Recent traumatic brain injury according to patient was when the leg of her sofa broke when she was sitting on it and she hit her head on the coffee table. She states she has hit her head multiple times in the past as well. Review of Systems:  Review of Systems   All other systems reviewed and are negative.         Social history updates:  Social History     Tobacco Use   Smoking Status Never   Smokeless Tobacco Never     Social History     Socioeconomic History   • Marital status: Single     Spouse name: Not on file   • Number of children: Not on file   • Years of education: Not on file   • Highest education level: Not on file   Occupational History   • Not on file   Tobacco Use   • Smoking status: Never   • Smokeless tobacco: Never   Vaping Use   • Vaping Use: Never used   Substance and Sexual Activity   • Alcohol use: Not Currently   • Drug use: Never   • Sexual activity: Not on file   Other Topics Concern   • Not on file   Social History Narrative    CONSUMES 1 SODA PER DAY     Social Determinants of Health     Financial Resource Strain: Not on file   Food Insecurity: Not on file   Transportation Needs: Not on file   Physical Activity: Not on file   Stress: Not on file   Social Connections: Not on file   Intimate Partner Violence: Not on file   Housing Stability: Not on file       PhysicalExamination:  Vitals: There were no vitals taken for this visit. Physical Exam  General:  Awake alert and oriented x 3, conversant without conversational dyspnea, NAD, normal affect  HEENT:  PERRL, Sclera noninjected, nonicteric OU, Nares patent,  no craniofacial abnormalities, Mucous membranes, moist, no oral lesions, normal dentition, Mallampati class 3-4  NECK:  Trachea midline, no accessory muscle use, no stridor, no cervical or supraclavicular adenopathy, JVP not elevated  CARDIAC: Reg, single s1/S2, no m/r/g  PULM: CTA bilaterally no wheezing, rhonchi or rales  EXT: No cyanosis, no clubbing, no edema, normal capillary refill  NEURO: no focal neurologic deficits, AAOx3, moving all extremities appropriately     Diagnostic Data:  Labs: I personally reviewed the most recent laboratory data pertinent to today's visit  No visits with results within 6 Month(s) from this visit.    Latest known visit with results is:   Admission on 05/06/2022, Discharged on 05/06/2022   Component Date Value   • Sodium 05/06/2022 138    • Potassium 05/06/2022 4.1    • Chloride 05/06/2022 105    • CO2 05/06/2022 26    • ANION GAP 05/06/2022 7    • BUN 05/06/2022 13    • Creatinine 05/06/2022 0.88    • Glucose 05/06/2022 90    • Calcium 05/06/2022 8.6    • eGFR 05/06/2022 83    • WBC 05/06/2022 5.21    • RBC 05/06/2022 4.01    • Hemoglobin 05/06/2022 12.4    • Hematocrit 05/06/2022 36.7    • MCV 05/06/2022 92    • MCH 05/06/2022 30.9    • MCHC 05/06/2022 33.8    • RDW 05/06/2022 11.9    • Platelets 24/46/5105 279    • MPV 05/06/2022 9.2    • Protime 05/06/2022 12.7    • INR 05/06/2022 0.98    • PTT 05/06/2022 20 (L)    • hs TnI 0hr 05/06/2022 2    • SARS-CoV-2 05/06/2022 Negative    • INFLUENZA A PCR 05/06/2022 Negative    • INFLUENZA B PCR 05/06/2022 Negative    • RSV PCR 05/06/2022 Negative    • POC Glucose 05/06/2022 91    • Ventricular Rate 05/06/2022 74    • Atrial Rate 05/06/2022 74    • IA Interval 05/06/2022 148    • QRSD Interval 05/06/2022 74    • QT Interval 05/06/2022 368    • QTC Interval 05/06/2022 408    • P Axis 05/06/2022 27    • QRS Axis 05/06/2022 52    • T Wave Axis 05/06/2022 32        I have personally reviewed pertinent lab results. Lab Results   Component Value Date    WBC 5.21 05/06/2022    HGB 12.4 05/06/2022    HCT 36.7 05/06/2022    MCV 92 05/06/2022     05/06/2022     Lab Results   Component Value Date    GLUCOSE 103 07/16/2014    CALCIUM 8.6 05/06/2022     07/16/2014    K 4.1 05/06/2022    CO2 26 05/06/2022     05/06/2022    BUN 13 05/06/2022    CREATININE 0.88 05/06/2022     No results found for: "IGE"  Lab Results   Component Value Date    ALT 21 11/02/2021    AST 19 11/02/2021    ALKPHOS 64 11/02/2021    BILITOT 0.3 07/16/2014     No results found for: "IRON", "TIBC", "FERRITIN"  No results found for: "Stacy Alvine"  No results found for: "FOLATE"      Arterial Blood Gas result: NA    Sleep studies:  Home sleep study on 5/30/2023 showed mild obstructive sleep apnea with an SOPHIE of 8.5, oxygen saturation reji of 85%.     Compliance Data:  ARTHUR Mosher MD  [facility]

## 2023-08-14 NOTE — PATIENT INSTRUCTIONS
Please fill out sleep diary 2 weeks before next appointment  Take off morning alarm  Keep cat outside of the room   Consider white noise machine   Call the office when you have health insurance, so we can order CPAP.

## 2023-08-24 ENCOUNTER — DOCUMENTATION (OUTPATIENT)
Dept: OTHER | Facility: HOSPITAL | Age: 38
End: 2023-08-24

## 2023-08-24 ENCOUNTER — OFFICE VISIT (OUTPATIENT)
Dept: NEUROLOGY | Facility: CLINIC | Age: 38
End: 2023-08-24

## 2023-08-24 ENCOUNTER — TELEPHONE (OUTPATIENT)
Dept: NEUROLOGY | Facility: CLINIC | Age: 38
End: 2023-08-24

## 2023-08-24 VITALS
HEART RATE: 80 BPM | BODY MASS INDEX: 46.94 KG/M2 | SYSTOLIC BLOOD PRESSURE: 108 MMHG | WEIGHT: 264.9 LBS | TEMPERATURE: 97 F | HEIGHT: 63 IN | DIASTOLIC BLOOD PRESSURE: 68 MMHG

## 2023-08-24 DIAGNOSIS — G47.33 OBSTRUCTIVE SLEEP APNEA: ICD-10-CM

## 2023-08-24 DIAGNOSIS — R29.818 NEUROLOGIC COMPLAINT, FUNCTIONAL: ICD-10-CM

## 2023-08-24 DIAGNOSIS — G43.009 MIGRAINE WITHOUT AURA AND WITHOUT STATUS MIGRAINOSUS, NOT INTRACTABLE: Primary | ICD-10-CM

## 2023-08-24 NOTE — PROGRESS NOTES
[facility] Neurology Headache [facility] Consult  PATIENT:  Yash Cervantes  MRN:  [id number]  :  1985  DATE OF SERVICE:  2023  REFERRED BY: No ref. provider found  PMD: Homer Meier MD    Assessment/Plan:   Yash Cervantes is a very pleasant  39 y.o. female with a past medical history that includes migraine, BMI over 45, vertigo, B12 deficiency, vitamin-D deficiency, insomnia, anxiety, depression, mild degenerative disc disease C4-5 referred here for evaluation of headache. Dr Carmen Thompson initial evaluation 3/29/2022    Of note, at visit on 2023 patient does not have any medical insurance     Chronic daily headache  Migraine without aura and without status migrainosus, not intractable  Post-traumatic stress with some symptoms of functional neurologic disorder  History of possible concussion - resolved  We discussed her history of multiple possible concussions although we discussed it is also difficult to diagnose concussion definitively and how posttraumatic headache with migrainous features can often have similar presentation.  Please see EMR and HPI for details.  Most recently on 2022, she was seated on a couch when the back of the couch collapsed causing her to fall and hit the right side of her head on an end table she thinks, does not remember hitting her head, but had a red conchis at the right cheek area.  She reports she does not remember a few seconds of time which we discussed can happen as a stress reaction are related to concussion and had progressively worsening up chronic vertigo and posttraumatic headache with migrainous features.  She was evaluated emergency department where noncontrast head CT was unremarkable for acute pathology and she subsequently followed up with primary care provider who kept her out of work and asked her to follow up with Neurology.   - As of 2022:  She reports following the injury vertigo actually got much better, she has chronic daily back on headaches for years dating back to at least 03/2020 that is worse about 1 day a week for which she is not interested in prescription preventative, did start trial of rizatriptan for abortive.  She also has history of anxiety, depression and some symptoms of posttraumatic stress with functional neurologic disorder with hyper startle response, avoidance behavior and wearing sunglasses indoors, intrusion symptoms, negative impact on cognition and mood and arousal and reactivity changes.  Also some symptoms of deconditioning and maladaptive coping.  We discussed continue follow-up with PCP and recommended establishing care with a counselor for which I will have our  see if she can help her with a list.  She was reassured by this information and we discussed gradual return to normalcy.  Referral to sleep medicine for possible sleep apnea contributing to symptoms. - as of 5/6/2022: She reports she continues to have near daily headaches that are mild about 3/10 and worse about 2 times in the past month for which she did not yet try rizatriptan, however did go to ED today instead and sumatriptan plus IV fluids and Zofran helped. Forrest Garcia is still working on following up with Sleep Medicine, eye doctor, counseling. Forrest Garcia is now interested in prescription preventative and will start trial of amitriptyline with gradual titration up and again recommended taking rizatriptan next migraine.  - as of 8/19/2022: She reports she is doing a lot better, continues to have daily headaches that are about 3/10, but reports improvement on amitriptyline 50 mg which she started end of June, about 6 weeks ago. None more than a 5/10 in 2 months, no longer having daily migraines, so hasnt needed rizatriptan. She is continuing to re-expose herself to TV, screens etc slowly.   - as of 1/10/2023: currently pain is a 1/10. Had a good week between christmas and new years, then 3 days of pain 3-4/10.   Maybe getting 3 mild headaches a week and getting more severe headaches 2-3 a month but can last more than 1 day. Patient was asleep and her cat started screaming in the living room and woke her up and got out of bed quickly and concerned. Did fall at that time and twisted her ankle. Had only gotten to 1 tab in the am and 2 at bedtime. Did decrease back to 25 mg at bedtime. Continues to have PT for dizziness   - as of 4/10/2023: Since last visit tried and failed amitriptyline although it helped with migraine she had side effects. She stopped topiramate since last visit once she started Aimovig. She reports improvement since last visit on aimovig monthly  thus far after one dose with reduction in migraines. (On it through the pharmaceutical trial). Trying to get out more which is exhausting. Saw sleep medicine last month and home sleep study ordered out-of-pocket. Mood overall is better and gave some more resources. - as of 7/24/2023: She reports doing significantly better on Aimovig and the frequency has gone down to at most once a week and severity has gone down to where she finally feels functional.  Breakthrough maybe once a week she takes ibuprofen and it helps bring the pain down again. Baseline 1 out of 10 and we discussed on retrospective review I do see some findings consistent with IIH and at any time if she would like could consider lumbar puncture and addition of acetazolamide/Diamox which she would prefer to hold off at this time although we discussed I will be certainly more forceful if she does have progressive vision changes at her next eye exam as I suspect it could be due to untreated sleep apnea contributing to the IIH.    - as of 8/24/2023:  When a large shift in barometric pressure does have in increase in pain and pressure with these. Is getting a daily headache at some point in the day but is not daily, persistent and continuous. Getting more severe a few times a week, less severe and intense.   Takes 2 ibuprofen to alleviate and has only used 2 rizatriptan since last injection of Aimovig (8/2/2023)     Workup:  - as of initial visit 03/29/2022 Neurologic assessment reveals normal neurological exam except for depressed mood and affect, tearful, sunglasses indoors, hyper startle response to light touch, allodynia bifrontal forehead   - noncontrast head CT 03/04/2020: No acute intracranial abnormality.  - CTA head and neck with without contrast 11/02/2021: Normal CT angiogram of the head and neck. - MRI brain IAC with without contrast 2/9/22: No IAC or CP angle pathology.  Unremarkable MRI of the brain. *We discussed in my retrospective review as of 7/24/2023 she does have some subtle findings of possible increased pressure in the brain including partially empty sella, some pallor of the right optic nerve, could call them slightly tortuous others may find them none tortuous. - noncontrast head CT 03/17/2022: No acute intracranial abnormality. - noncontrast head CT 05/06/2022: No acute intracranial abnormality. -CTA head and neck 05/06/2022: Negative CTA head and neck for large vessel occlusion, dissection, aneurysm, or high-grade stenosis.     Preventative:  - we discussed headache hygiene and lifestyle factors that may improve headaches  -  aimovig monthly through the study. Discussed proper use, possible side effects and risks. - Past/ failed/contraindicated: Amitriptyline, topiramate  - future options: Alternative CGRP med     Abortive:  - discussed not taking over-the-counter or prescription pain medications more than 3 days per week to prevent medication overuse/rebound headache  -     rizatriptan (MAXALT) 10 MG tablet; Take 1 tablet (10 mg total) by mouth once as needed for migraine May repeat in 2 hours if needed. Max 2/24 hours, 9/month. Discussed proper use, possible side effects and risks.   - Currently on through other providers: back up: steroids on hand if needed for back up, Ibuprofen, meclizine  - Past/ failed/contraindicated:  OTC meds  - past:  Has tolerated steroids in the past, migraine cocktails have brought pain down, sumatriptan in ED helped with IV fluids and Zofran  - future options:  prochlorperazine, Toradol IM or p.o., could consider trial for 5 days of Depakote or dexamethasone for prolonged migraine, ubrelvy, reyvow, nurtec     Insomnia, snoring, BUCKY diagnosed but unable to obtain CPAP due to lack of insurance     Patient instructions   Follow up with eye doctor for dilated eye exam and please let me know if they see any signs of papilledema or swelling behind your eyes and certainly go to the ER if you have any acute vision changes for lumbar puncture which could decrease pressure right away. We discussed I am not officially diagnosing you with idiopathic intracranial hypertension however you do have some subtle findings on imaging that could suggest this may be slightly elevated due to the untreated sleep apnea. If you ever wanted we could add acetazolamide/Diamox at any time to treat, get a lumbar puncture outpatient any time to treat or further work up, and do not trust that the eye doctor will send me the report.     - consider reading or listening to the book "The body keeps the score" - interesting book on how PTSD and stress can impact the body and cause physical symptoms     - "Rewire Your Anxious Brain: How to Use the Neuroscience of Fear to End Anxiety, Panic, and Worry" By Tyrone Gross PhD     Headache/migraine treatment:   Abortive medications (for immediate treatment of a headache): It is ok to take ibuprofen, acetaminophen or naproxen (Advil, Tylenol,  Aleve, Excedrin) if they help your headaches you should limit these to No more than 3 times a week to avoid medication overuse/rebound headaches.      For your more moderate to severe migraines take this medication early   Maxalt (rizatriptan) 10mg tabs - take one at the onset of headache.  May repeat one time after 1-2 hours if pain has not resolved. (Max 2 a day and 9 a month)         Prescription preventive medications for headaches/migraines   (to take every day to help prevent headaches - not to take at the time of headache):  [x]? Aimovig 1 shot monthly if we can get through the company OR through [facility] with [facility] or other      If needed there is a coupon card for the copay at [employer]     READ INSTRUCTIONS that come with the medication. REFRIGERATE. Keep out of direct sunlight. Prior to administration, allow to come to room temperature for 30 minutes. Do not warm using a heat source (eg, microwave or hot water). Do not shake. Administer in preferably abdomen (avoiding 2 inches around the navel), thigh, upper arm, or buttocks avoiding areas of skin that are tender, bruised, red or hard. Deliver entire contents of single-use prefilled pen or syringe. Unknown impact in pregnancy therefore would recommend stopping 6 months prior to considering pregnancy.        I suggest trial of lower dose diamox than we typically use in more severe cases -  -     acetaZOLAMIDE (DIAMOX) 125 mg tablet;   125 mg  in am and in pm for 1 week,   then 125 mg in am and 250 mg in pm for 1 week,   then 250 mg in am and in pm     If we ever were to start this medication and if at any point you felt like 3 to 5 hours after taking a dose you felt much worse with your symptoms then it is more likely that the medication is wearing off rather than it being a side effect of the medication and we could add a 125 to 250 mg dose midday if needed.      -If you had papilledema or swelling behind your eyes we would rapidly get you up to 500 mg twice a day and may need to go higher.  I have 1 patient who is up to 3000 mg in a day just for reference and I will be starting you on 250 mg.       - if a lower dose is helpful, can stay lower, if higher dose causes side effects, go back to last tolerated dose.   If you get all the way up to the dose recommended and is not helping enough let me know as I will absolutely go higher.     - make sure to stay hydrated while on this as can cause dehydration since that is it's purpose to take fluid off.   - most common side effect is tingling of the nerves at times, makes carbonated drinks taste flat   - can cause electrolyte disturbances, but not typically in any significant way. However, be cautious if taking with other meds that lower potassium like hydrochlorothiazide etc      *Typically these types of medications take time untill you see the benefit, although some may see improvement in days, often it may take weeks, especially if the medication is being titrated up to a beneficial level. Please contact us if there are any concerns or questions regarding the medication.      Lifestyle Recommendations:  [x]? SLEEP - Maintain a regular sleep schedule: Adults need at least 7-8 hours of uninterrupted a night. Maintain good sleep hygiene:  Going to bed and waking up at consistent times, avoiding excessive daytime naps, avoiding caffeinated beverages in the evening, avoid excessive stimulation in the evening and generally using bed primarily for sleeping. One hour before bedtime would recommend turning lights down lower, decreasing your activity (may read quietly, listen to music at a low volume). When you get into bed, should eliminate all technology (no texting, emailing, playing with your phone, iPad or tablet in bed). [x]? HYDRATION - Maintain good hydration. Drink  2L of fluid a day (4 typical small water bottles)  [x]? DIET - Maintain good nutrition. In particular don't skip meals and try and eat healthy balanced meals regularly. [x]? TRIGGERS - Look for other triggers and avoid them: Limit caffeine to 1-2 cups a day or less. Avoid dietary triggers that you have noticed bring on your headaches (this could include aged cheese, peanuts, MSG, aspartame and nitrates). [x]?  EXERCISE - physical exercise as we all know is good for you in many ways, and not only is good for your heart, but also is beneficial for your mental health, cognitive health and  chronic pain/headaches. I would encourage at the least 5 days of physical exercise weekly for at least 30 minutes.      Education and Follow-up  [x]? Please call with any questions or concerns. Of course if any new concerning symptoms go to the emergency department. [x]? Follow up 3-4 months, sooner if needed         CC: We had the pleasure of evaluating Kvng Turk in neurological consultation today. Kvng Turk is a   left  handed female who presents today for evaluation of headaches.      History obtained from patient as well as available medical record review. History of Present Illness:   Interval history as of 2023  - mood has improved since last visit as this was situational  - no significant  new or concerning neurologic symptoms since last visit   - has not seen eye doctor yet due to insurance    Headaches and migraines   She reports doing well and improving on the shots  Frequency has gone down to at 1-2 times a week and severity has gone down to where she finally feels functional  Breakthroughs maybe once a week and she takes ibuprofen for that and it helps bring it down  To being able to function again like a 3/10.   Baseline is a 1 out of 10 daily     Preventative:   - Aimovig 1 shot monthly through the study      Abortive:   -Not needing rizatriptan much - expensive and ibuprofen helps   Denies bothersome side effects      Home sleep study 2023, SOPHIE 8.5, O2 down to 85%, and not yet treating due to cant afford to treat  Starts on back settle in and then to fall asleep goes to one side or other     Interval history as of 2023  - no significant new or concerning neurologic symptoms since last visit   -Mood- did have some more headaches in the past month as both friend and grandfather   - eye doctor years ago - wears glasses, looking to get another one      Headaches and migraines   She reports doing well and improving on the shots  Frequency has gone down to at most once a week and severity has gone down to where she finally feels functional  Breakthroughs maybe once a week and she takes ibuprofen for that and it helps bring it down  To being able to function again like a 3/10. Baseline is a 1 out of 10 daily     Preventative:   - Aimovig 1 shot monthly through the study      Abortive:   -Not needing rizatriptan much - expensive and ibuprofen helps   Denies bothersome side effects      Home sleep study 6/4/2023, SOPHIE 8.5, O2 down to 85%, and not yet treating due to cant afford to treat  Starts on back settle in and then to fall asleep goes to one side or other         Interval history as of 4/10/2023  - no significant new or concerning neurologic symptoms since last visit   -She has been doing exposure therapy for PTSD and screen time  - Followed up with NORTH Hassan 1/10/2023  - trying to go out more and its exhausting   - sleep medicine 3/16/23 and home study ordered   - mood - a little discouraged when the dizziness worsened but staying positive   - no vision changes lately   - not in with counselor      Headaches and migraines   She reports after the first week of 1/2 no migraines and the baseline was better  Two weeks after first shot dizzy all day   Now that shot is wearing off migraine last weekend and baseline headache and dizziness improving  Classic migraines - last sat first in 2.5 weeks and the rest of the headaches that are milder 4-5/10      Preventative:   - trial of aimovig through the trial - first shot last month 3/14/23 and next shot 4/11/23  -Amitriptyline 50 mg nightly-she called in 10/14/2022 sick to her stomach and not taking amitriptyline for the past couple of days and having trouble sleeping the past 4 nights which would not be surprising if this medication is stopped suddenly. Even on amitriptyline only 2 to 4 hours of sleep.   Amitriptyline was helping decrease the number of migraines, but wanted to try something else and topiramate was sent   - Trial of topiramate with gradual titration up to 50 mg twice daily and was helping with the severity of the migraines but the baseline number of headaches and intensity was getting worse/background 1/10 - up to 4/10 and started weaning off two weeks ago or so     Abortive:   - rizatriptan as needed and has not needed in a while, sometime in Feb  Denies bothersome side effects   - through PCP - not tried steroids prescribed by PCP 11/4/22     Interval history as of 1/10/2023  Is attempting to get insurance coverage but does not have any at this time     - has not gone to sleep medicine  - has not followed up with eye due to cost  - vertigo has improved with PT. No longer taking medication for this     Goes to bed at 1030 pm  Wakes up at 830 am  Wakes up multiple times 3-4+ a night  Sleep is not restful     - Continues to be out of work, no job, is going to start looking for [employer]     Mild headaches last 20 minutes to a few days if she doesn't take her medication (ibuprofen), if she takes ibuprofen will last 20 minutes   Migraines last 3-4 hours with the rizatriptan.   Only had to repeat 1 time in the past 2 months      Interval history as of 8/19/2022  - denies any new or concerning neurologic symptoms since last visit   - Saw NORTH Malhotra 6/28/22   - Vertigo has improved and still goes to PT once a week, no longer takes meclizine as it was not helping  - Was referred to sleep medicine but she has no insurance currently and was unable to make an appointment with them due to cost  - Sleep is worse than previous, now sleeping 2 hours and then waking up, used to have more problems falling asleep but now the problem is staying asleep  - Continues to be out of work, no job   - Has not been able to follow up with eye doctor due to cost  - No ED visits since May  - Is working on exposure therapy every day but doesn't feel like she is making improvements currently and has been stuck trying to improve her time looking at screens, can only tolerate an hour of TV or 20 mins of computer     Headaches and migraines   - baseline headache is now a 3/10 from a 1/10 last time  - has not had a headache that is >5/10 in more than 2 months  Preventative: amitriptyline now up to 50 mg HS - just started it late Pedro (has not had to use it at all)  Denies bothersome side effects         Interval history as of 5/6/2022  - PCP is managing return to work, she is going back to work on Monday   -she had VNG which showed some sort of peripheral abnormality on the left contributing to her vertigo and is back with vestibular therapy  - she continues to struggle with deconditioning to lights and activities  -she has not yet followed up with counselor as recommended although  did offer list of counselors covered by her insurance, but has someone at PCP office that she is looking into   - She has not yet followed up with Sleep Medicine is recommended  - has not yet seen eye doctor for dilated eye exam, planning for next month      Headaches and migraines   She had she continues to have chronic near daily headaches that are typically mild at about 3/10, but only one significant migraine 6/10 yesterday where she took aleve and today was worse and went with ED.     She went to the emergency department this morning due to migraine with diplopia, did not try rizatriptan and had improvement with sumatriptan a migraine cocktail with significant improvement in symptoms.  She in fact saw Neurology while in the emergency department and CTA head and neck and CT head were performed.     Has mostly not been taking any medications for headaches. Headache was worse yesterday and she had a naproxen in early afternoon. She has not been taking maxalt at home.  Has been working on exposure therapy at home with light and screens and is only up to moderate tolerance.      Preventative: nothing   Abortive: had not tried Rizatriptan yet        History as of our initial visit 03/29/2022: History of concussion  - 9th grade. States that she hit her head during indoor soccer and perhaps had a few seconds of LOC vs anmesia.    - Most of 20s did not have medical insurance and had some hits to the head   - 2/29/2020 hit her head on her car.  Didn't have LOC.  Headaches, motion and light sensitivity started 20 minutes after. Next time lost balance   - 3/4/2020 when she was stepping out of her car, loss balance, did not hit head.  Went to ER and then did therapy afterwards. Mony Can her job after this.     - Patient started a job in August 2021 after being out of work for 1.5 years. William Castillo feels like this was over stimulating for her. William Castillo felt noise and light were overwhelming for her.     - November of 2021 patient presented to her family doctor with a severe headache that was unrelenting for 1 and half weeks.  Patient had stabbing on the left side of her head.   dizziness and felt unsteady.  She was sent to the emergency room for evaluation via ambulance.   CTA of the head and neck was done in the emergency room which was negative   - Since the ER, states that she developed dizziness 1 week before [place] out of nowhere. Rojas Rank from home since 12/27/2021 due to her dizziness.    - Does wear her sunglasses frequently during the day indoors.  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  States at home uses less, however she did place them on 2 times during our visit  - She has followed with Sports Medicine, physical therapy, primary care, saw my neurology colleague NORTH Vasquez 02/02/2022  - went back to work after visit NORTH Vasquez, was working from home until episode 3/17/22     On 03/17/2022 she was seated on a couch when the back of the couch collapsed causing her to fall and hit the right side of her head on an end table she thinks, does not remember hitting her head, but had a red conchis at the right cheek area  Acute symptoms included:  Does not remember a few seconds of time, unknown if LOC as unwitnessed, progressively worsening vertigo/room spinning and generalized headache with photophobia and phonophobia and nausea     - as of 3/29/2022: she continues to have chronic photophobia, migraines, but vertigo after the incident actually has now been better than prior      Mood:   - anxiety and depression in the past, worse later due to medical conditions   - post traumatic stress symptoms - hypervigilance, impact on cognition and mood, lack of emotional filter  Things at work are bad, HR said if no emotional filter you should not come back   Never worked with a counselor   No SI     Work  8-4  M-F  Has not worked in some time        Headaches started at what age? 20 years old  How often do the headaches occur?   - as of 3/29/2022: chronic daily background headaches for years since at least 3/2020, worse when vertigo kicked in Dec 2021 and 1 day a week above 3/10  - as of 1/10/2023 3-4+ mild headaches a week with 2-3+ severe a month which can last more than 1 day     Aura? without aura      Last eye exam: eye sight improving, followed a long time ago, 7 years ago      Where is your headache located and pain quality?   - bifrontal pressure like an iron band across forehead is most common and radiates back across the top of the scalp until bioccipital   - ice pick left parietal   What is the intensity of pain? Average: 1/10, worst 7/10  Associated symptoms:   [x]? ? Nausea       []? ? Vomiting      [x]? ? Photophobia     [x]? ?Phonophobia      []? ? Osmophobia  []? ? Blurred vision   [x]? ? Light-headed or dizzy - chronic     []? ? Tinnitus - not for prolonged periods and not severe  []? ? Hands or feet tingle or feel numb/paresthesias    []? ? Ptosis      []? ? Facial droop  []? ? Lacrimation  []? ? Nasal congestion/rhinorrhea          Things that make the headache worse? No specific movements, sometimes bending over     Headache triggers:  Lights, can be worse in am, stress, weather changes, unsure if hormonal      Have you seen someone else for headaches or pain? Yes many providers   Are you current pregnant or planning on getting pregnant? No plans, not active   Have you ever had any Brain imaging? yes     What medications do you take or have you taken for your headaches? ABORTIVE:       Ibuprofen dulls it  Meclizine     Past  Something as needed in the past   Steroids in the past   Migraine cocktails in ED have helped bring pain down      PREVENTIVE:   -     MVI      Past/ failed/contraindicated:   -        LIFESTYLE  Sleep   - averages: 6-8 hours, snores, never had a sleep study  Problems falling asleep?:   Yes  Problems staying asleep?:  Yes, 1-4 for unknown reasons        Water: 6 cups per day  Caffeine: usually 0-1 soda per day        The following portions of the patient's history were reviewed and updated as appropriate: allergies, current medications, past family history, past medical history, past social history, past surgical history and problem list.     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  Lives with cat      Past Medical History:     Past Medical History:   Diagnosis Date   • Concussion    • Edema of left lower extremity 07/12/2021   • Sleep apnea    • TBI (traumatic brain injury) ([address])     [id number] stated by patient       Patient Active Problem List   Diagnosis   • Obesity   • Headache   • Vertigo   • Worsening headaches   • B12 deficiency   • Vitamin D deficiency   • Snoring   • Post-traumatic stress   • Chronic post-traumatic headache   • Migraine   • 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 Max 2/24 hours, 9/month. 9 tablet 11     No current facility-administered medications for this visit. Allergies:    No Known Allergies    Family History:     Family History   Problem Relation Age of Onset   • Cancer Mother    • Heart disease Father        Social History:       Social History     Socioeconomic History   • Marital status: Single     Spouse name: Not on file   • Number of children: Not on file   • Years of education: Not on file   • Highest education level: Not on file   Occupational History   • Not on file   Tobacco Use   • Smoking status: Never   • Smokeless tobacco: Never   Vaping Use   • Vaping Use: Never used   Substance and Sexual Activity   • Alcohol use: Not Currently   • Drug use: Never   • Sexual activity: Not on file   Other Topics Concern   • Not on file   Social History Narrative    CONSUMES 1 SODA PER DAY     Social Determinants of Health     Financial Resource Strain: Not on file   Food Insecurity: Not on file   Transportation Needs: Not on file   Physical Activity: Not on file   Stress: Not on file   Social Connections: Not on file   Intimate Partner Violence: Not on file   Housing Stability: Not on file      I have reviewed the patient's medical, social and surgical history as well as medications in detail and updated the computerized patient record.       Objective:     Vitals:    08/24/23 1507   BP: 108/68   BP Location: Right arm Patient Position: Sitting   Cuff Size: Large   Pulse: 80   Temp: (!) 97 °F (36.1 °C)   TempSrc: Temporal   Weight: 120 kg (264 lb 14.4 oz)   Height: 5' 3" (1.6 m)            /79 (BP Location: Right arm, Patient Position: Sitting, Cuff Size: Standard)   Pulse 69   Ht 5' 3" (1.6 m)   Wt 118 kg (261 lb)   BMI 46.23 kg/m²       BP Readings from Last 3 Encounters:   07/24/23 125/79   04/10/23 112/70   03/16/23 123/73          Pulse Readings from Last 3 Encounters:   07/24/23 69   04/10/23 76   03/16/23 66            Well developed, well nourished, well groomed.          Psychiatric:  Normal behavior and appropriate affect          Neurological Examination:     Mental status/cognitive function:   Recent and remote memory appear intact. Attention span and concentration as well as fund of knowledge are appropriate for age. Normal language and spontaneous speech. Cranial Nerves:   VII-facial expression symmetric  Gait: steady casual gait           Pertinent lab results:   5/6/2022 BMP unremarkable  CBC unremarkable  11/02/2021 CMP and CBC unremarkable  TSH normal     EKG 05/06/2022 sinus rhythm with sinus arrhythmia, normal EKG, rate 74,   EKG 5/15/2014,  normal sinus rhythm, rate 80,      Imaging: I have personally reviewed imaging and radiology read   - noncontrast head CT 03/04/2020: No acute intracranial abnormality.  - CTA head and neck with without contrast 11/02/2021: Normal CT angiogram of the head and neck. - MRI brain IAC with without contrast 2/9/22: No IAC or CP angle pathology.  Unremarkable MRI of the brain. - noncontrast head CT 03/17/2022: No acute intracranial abnormality. Review of Systems:     Constitutional: Negative. HENT: Negative. Eyes: Negative. Respiratory: Negative. Cardiovascular: Negative. Gastrointestinal: Negative. Endocrine: Negative. Genitourinary: Negative. Musculoskeletal: Negative. Skin: Negative.     Allergic/Immunologic: Negative. Neurological: Positive for headaches. Hematological: Negative. Psychiatric/Behavioral: Negative. I personally reviewed and updated the ROS that was entered by the medical assistant        I have spent a total time of 39 minutes on 08/25/23 in caring for this patient including Prognosis, Risks and benefits of tx options, Instructions for management, Patient and family education, Importance of tx compliance, Risk factor reductions, Impressions, Counseling / Coordination of care, Documenting in the medical record, Reviewing / ordering tests, medicine, procedures   and Obtaining or reviewing history  .         Author:  Elaine Fortune PA-C 8/25/2023 3:51 PM

## 2023-08-24 NOTE — TELEPHONE ENCOUNTER
Patient has been on aimovig through the clinical trial but the trial is ending for her as of this month. She doesn't have insurance. Can we see what programs are available for assistance for her? Unfortunately her last dose was 8/2/2023 and she would be due for injection on 9/2/23. I know that this is not possible and I did discuss with patient. Patient was hoping her disability claim was going to proceed faster so she could get the insurance that would be medicare type with SSDI. However, there has been a lag. She is reaching out to her /advocate tomorrow or Monday to see what is going on. Can we also see what type of gage care is available for her at [facility]? Perhaps she could also obtain medication(s) this way. Can you reach out to her?

## 2023-08-24 NOTE — PATIENT INSTRUCTIONS
Follow up with eye doctor for dilated eye exam and please let me know if they see any signs of papilledema or swelling behind your eyes and certainly go to the ER if you have any acute vision changes for lumbar puncture which could decrease pressure right away. We discussed I am not officially diagnosing you with idiopathic intracranial hypertension however you do have some subtle findings on imaging that could suggest this may be slightly elevated due to the untreated sleep apnea. If you ever wanted we could add acetazolamide/Diamox at any time to treat, get a lumbar puncture outpatient any time to treat or further work up, and do not trust that the eye doctor will send me the report. - consider reading or listening to the book "The body keeps the score" - interesting book on how PTSD and stress can impact the body and cause physical symptoms    - "Rewire Your Anxious Brain: How to Use the Neuroscience of Fear to End Anxiety, Panic, and Worry" By Modesta Zaragoza PhD    Headache/migraine treatment:   Abortive medications (for immediate treatment of a headache): It is ok to take ibuprofen, acetaminophen or naproxen (Advil, Tylenol,  Aleve, Excedrin) if they help your headaches you should limit these to No more than 3 times a week to avoid medication overuse/rebound headaches. For your more moderate to severe migraines take this medication early   Maxalt (rizatriptan) 10mg tabs - take one at the onset of headache. May repeat one time after 1-2 hours if pain has not resolved. (Max 2 a day and 9 a month)       Prescription preventive medications for headaches/migraines   (to take every day to help prevent headaches - not to take at the time of headache):  [x]Aimovig 1 shot monthly if you can get insurance or through company. READ INSTRUCTIONS that come with the medication. REFRIGERATE. Keep out of direct sunlight. Prior to administration, allow to come to room temperature for 30 minutes.  Do not warm using a heat source (eg, microwave or hot water). Do not shake. Administer in preferably abdomen (avoiding 2 inches around the navel), thigh, upper arm, or buttocks avoiding areas of skin that are tender, bruised, red or hard. Deliver entire contents of single-use prefilled pen or syringe. Unknown impact in pregnancy therefore would recommend stopping 6 months prior to considering pregnancy. I suggest trial of lower dose diamox than we typically use in more severe cases -  -     acetaZOLAMIDE (DIAMOX) 125 mg tablet;   125 mg  in am and in pm for 1 week,   then 125 mg in am and 250 mg in pm for 1 week,   then 250 mg in am and in pm    If we ever were to start this medication and if at any point you felt like 3 to 5 hours after taking a dose you felt much worse with your symptoms then it is more likely that the medication is wearing off rather than it being a side effect of the medication and we could add a 125 to 250 mg dose midday if needed. -If you had papilledema or swelling behind your eyes we would rapidly get you up to 500 mg twice a day and may need to go higher. I have 1 patient who is up to 3000 mg in a day just for reference and I will be starting you on 250 mg.      - if a lower dose is helpful, can stay lower, if higher dose causes side effects, go back to last tolerated dose. If you get all the way up to the dose recommended and is not helping enough let me know as I will absolutely go higher.    - make sure to stay hydrated while on this as can cause dehydration since that is it's purpose to take fluid off.   - most common side effect is tingling of the nerves at times, makes carbonated drinks taste flat   - can cause electrolyte disturbances, but not typically in any significant way.  However, be cautious if taking with other meds that lower potassium like hydrochlorothiazide etc      *Typically these types of medications take time untill you see the benefit, although some may see improvement in days, often it may take weeks, especially if the medication is being titrated up to a beneficial level. Please contact us if there are any concerns or questions regarding the medication. Lifestyle Recommendations:  [x] SLEEP - Maintain a regular sleep schedule: Adults need at least 7-8 hours of uninterrupted a night. Maintain good sleep hygiene:  Going to bed and waking up at consistent times, avoiding excessive daytime naps, avoiding caffeinated beverages in the evening, avoid excessive stimulation in the evening and generally using bed primarily for sleeping. One hour before bedtime would recommend turning lights down lower, decreasing your activity (may read quietly, listen to music at a low volume). When you get into bed, should eliminate all technology (no texting, emailing, playing with your phone, iPad or tablet in bed). [x] HYDRATION - Maintain good hydration. Drink  2L of fluid a day (4 typical small water bottles)  [x] DIET - Maintain good nutrition. In particular don't skip meals and try and eat healthy balanced meals regularly. [x] TRIGGERS - Look for other triggers and avoid them: Limit caffeine to 1-2 cups a day or less. Avoid dietary triggers that you have noticed bring on your headaches (this could include aged cheese, peanuts, MSG, aspartame and nitrates). [x] EXERCISE - physical exercise as we all know is good for you in many ways, and not only is good for your heart, but also is beneficial for your mental health, cognitive health and  chronic pain/headaches. I would encourage at the least 5 days of physical exercise weekly for at least 30 minutes. Education and Follow-up  [x] Please call with any questions or concerns. Of course if any new concerning symptoms go to the emergency department.   [x] Follow up 3-4 months, sooner if needed

## 2023-08-25 NOTE — TELEPHONE ENCOUNTER
MSW phoned the [address] [place] [address] at [phone] to inquire if patient would still be eligible to apply for the Aimovig PAP if she had been on a clinical trial. MSW spoke with Qing Stone who advised that patient can still apply for the Aimovig PAP gregory as the clinical trial would not affect her eligibility. MSW attempted to reach patient to review the Aimovig PAP application and income guidelines for same. No answer at [phone]. MSW left a message requesting callback. Awaiting same. MSW can also offer [address] if patient has outstanding bills within in the network (but the [address] will not assist with medication affordability). MSW initiated the provider section of the Aimovig PAP application. Same was sent to ESTEFANI for completion/signature.

## 2023-08-25 NOTE — PROGRESS NOTES
Subject  #[id number] arrived for  Week 24 appointment on 8/24/2023 @ 3:00 p.m.. Site reviewed migraine preventative medications, concomitant medications, adverse events, serious adverse events, eDiary entries . Weight  Weight 120 kg. No new events to report EDiary updated and completed. Subject is interested in continuing to use Aimovig. She will discuss options with her provider. All procedures completed per protocol.

## 2023-08-28 NOTE — TELEPHONE ENCOUNTER
MSW attempted to reach patient to review the Aimovig PAP application and income guidelines for same. No answer at [phone]. MSW left a message requesting callback. Awaiting same. MSW can also offer [address] if patient has outstanding bills within in the network (but the [address] will not assist with medication affordability).

## 2023-08-29 NOTE — TELEPHONE ENCOUNTER
MSW attempted to reach patient to review the Aimovig PAP application and income guidelines for same. No answer at [phone]. MSW left a message requesting callback. Awaiting same. MSW can also offer [address] if patient has outstanding bills within in the network (but the [address] will not assist with medication affordability). Since there have been 3 unsuccessful attempts, MSW placed an "Unable to Reach" letter in the mail this date along with the Aimovig PAP application. MSW will be available should patient call back.

## 2023-09-22 ENCOUNTER — HOSPITAL ENCOUNTER (EMERGENCY)
Facility: HOSPITAL | Age: 38
Discharge: HOME | End: 2023-09-22
Attending: EMERGENCY MEDICINE

## 2023-09-22 ENCOUNTER — OFFICE VISIT (OUTPATIENT)
Dept: FAMILY MEDICINE CLINIC | Facility: CLINIC | Age: 38
End: 2023-09-22

## 2023-09-22 ENCOUNTER — APPOINTMENT (EMERGENCY)
Dept: CT IMAGING | Facility: HOSPITAL | Age: 38
End: 2023-09-22

## 2023-09-22 VITALS
RESPIRATION RATE: 100 BRPM | HEART RATE: 63 BPM | DIASTOLIC BLOOD PRESSURE: 70 MMHG | WEIGHT: 262 LBS | SYSTOLIC BLOOD PRESSURE: 100 MMHG | BODY MASS INDEX: 46.42 KG/M2 | HEIGHT: 63 IN

## 2023-09-22 VITALS
DIASTOLIC BLOOD PRESSURE: 67 MMHG | BODY MASS INDEX: 46.2 KG/M2 | SYSTOLIC BLOOD PRESSURE: 126 MMHG | HEART RATE: 66 BPM | RESPIRATION RATE: 18 BRPM | OXYGEN SATURATION: 98 % | WEIGHT: 260.8 LBS | TEMPERATURE: 98.2 F

## 2023-09-22 DIAGNOSIS — R10.32 LLQ ABDOMINAL PAIN: Primary | ICD-10-CM

## 2023-09-22 DIAGNOSIS — R11.2 NAUSEA AND VOMITING, UNSPECIFIED VOMITING TYPE: ICD-10-CM

## 2023-09-22 DIAGNOSIS — G43.009 MIGRAINE WITHOUT AURA AND WITHOUT STATUS MIGRAINOSUS, NOT INTRACTABLE: ICD-10-CM

## 2023-09-22 DIAGNOSIS — R42 VERTIGO: ICD-10-CM

## 2023-09-22 DIAGNOSIS — R10.32 LLQ PAIN: Primary | ICD-10-CM

## 2023-09-22 DIAGNOSIS — R91.1 LUNG NODULE: ICD-10-CM

## 2023-09-22 LAB
ALBUMIN SERPL BCP-MCNC: 4 G/DL (ref 3.5–5)
ALP SERPL-CCNC: 73 U/L (ref 34–104)
ALT SERPL W P-5'-P-CCNC: 10 U/L (ref 7–52)
ANION GAP SERPL CALCULATED.3IONS-SCNC: 8 MMOL/L
AST SERPL W P-5'-P-CCNC: 12 U/L (ref 13–39)
BACTERIA UR QL AUTO: ABNORMAL /HPF
BASOPHILS # BLD AUTO: 0.03 THOUSANDS/ÂΜL (ref 0–0.1)
BASOPHILS NFR BLD AUTO: 0 % (ref 0–1)
BILIRUB SERPL-MCNC: 0.57 MG/DL (ref 0.2–1)
BILIRUB UR QL STRIP: NEGATIVE
BUN SERPL-MCNC: 9 MG/DL (ref 5–25)
CALCIUM SERPL-MCNC: 9.3 MG/DL (ref 8.4–10.2)
CHLORIDE SERPL-SCNC: 103 MMOL/L (ref 96–108)
CLARITY UR: CLEAR
CO2 SERPL-SCNC: 25 MMOL/L (ref 21–32)
COLOR UR: ABNORMAL
CREAT SERPL-MCNC: 0.86 MG/DL (ref 0.6–1.3)
EOSINOPHIL # BLD AUTO: 0.04 THOUSAND/ÂΜL (ref 0–0.61)
EOSINOPHIL NFR BLD AUTO: 1 % (ref 0–6)
ERYTHROCYTE [DISTWIDTH] IN BLOOD BY AUTOMATED COUNT: 12.5 % (ref 11.6–15.1)
GFR SERPL CREATININE-BSD FRML MDRD: 85 ML/MIN/1.73SQ M
GLUCOSE SERPL-MCNC: 96 MG/DL (ref 65–140)
GLUCOSE UR STRIP-MCNC: NEGATIVE MG/DL
HCG SERPL QL: NEGATIVE
HCT VFR BLD AUTO: 39.7 % (ref 34.8–46.1)
HGB BLD-MCNC: 13.2 G/DL (ref 11.5–15.4)
HGB UR QL STRIP.AUTO: ABNORMAL
IMM GRANULOCYTES # BLD AUTO: 0.02 THOUSAND/UL (ref 0–0.2)
IMM GRANULOCYTES NFR BLD AUTO: 0 % (ref 0–2)
KETONES UR STRIP-MCNC: ABNORMAL MG/DL
LEUKOCYTE ESTERASE UR QL STRIP: NEGATIVE
LIPASE SERPL-CCNC: 10 U/L (ref 11–82)
LYMPHOCYTES # BLD AUTO: 2.04 THOUSANDS/ÂΜL (ref 0.6–4.47)
LYMPHOCYTES NFR BLD AUTO: 27 % (ref 14–44)
MCH RBC QN AUTO: 29.9 PG (ref 26.8–34.3)
MCHC RBC AUTO-ENTMCNC: 33.2 G/DL (ref 31.4–37.4)
MCV RBC AUTO: 90 FL (ref 82–98)
MONOCYTES # BLD AUTO: 0.38 THOUSAND/ÂΜL (ref 0.17–1.22)
MONOCYTES NFR BLD AUTO: 5 % (ref 4–12)
MUCOUS THREADS UR QL AUTO: ABNORMAL
NEUTROPHILS # BLD AUTO: 4.94 THOUSANDS/ÂΜL (ref 1.85–7.62)
NEUTS SEG NFR BLD AUTO: 67 % (ref 43–75)
NITRITE UR QL STRIP: NEGATIVE
NON-SQ EPI CELLS URNS QL MICRO: ABNORMAL /HPF
NRBC BLD AUTO-RTO: 0 /100 WBCS
PH UR STRIP.AUTO: 5.5 [PH]
PLATELET # BLD AUTO: 342 THOUSANDS/UL (ref 149–390)
PMV BLD AUTO: 8.7 FL (ref 8.9–12.7)
POTASSIUM SERPL-SCNC: 4 MMOL/L (ref 3.5–5.3)
PROT SERPL-MCNC: 7.6 G/DL (ref 6.4–8.4)
PROT UR STRIP-MCNC: ABNORMAL MG/DL
RBC # BLD AUTO: 4.42 MILLION/UL (ref 3.81–5.12)
RBC #/AREA URNS AUTO: ABNORMAL /HPF
SODIUM SERPL-SCNC: 136 MMOL/L (ref 135–147)
SP GR UR STRIP.AUTO: >=1.05 (ref 1–1.03)
UROBILINOGEN UR STRIP-ACNC: <2 MG/DL
WBC # BLD AUTO: 7.45 THOUSAND/UL (ref 4.31–10.16)
WBC #/AREA URNS AUTO: ABNORMAL /HPF

## 2023-09-22 PROCEDURE — 36415 COLL VENOUS BLD VENIPUNCTURE: CPT | Performed by: PHYSICIAN ASSISTANT

## 2023-09-22 PROCEDURE — 99213 OFFICE O/P EST LOW 20 MIN: CPT | Performed by: PHYSICIAN ASSISTANT

## 2023-09-22 PROCEDURE — 99284 EMERGENCY DEPT VISIT MOD MDM: CPT

## 2023-09-22 PROCEDURE — 99285 EMERGENCY DEPT VISIT HI MDM: CPT | Performed by: PHYSICIAN ASSISTANT

## 2023-09-22 PROCEDURE — 74177 CT ABD & PELVIS W/CONTRAST: CPT

## 2023-09-22 PROCEDURE — G1004 CDSM NDSC: HCPCS

## 2023-09-22 PROCEDURE — 84703 CHORIONIC GONADOTROPIN ASSAY: CPT | Performed by: PHYSICIAN ASSISTANT

## 2023-09-22 PROCEDURE — 83690 ASSAY OF LIPASE: CPT | Performed by: PHYSICIAN ASSISTANT

## 2023-09-22 PROCEDURE — 81001 URINALYSIS AUTO W/SCOPE: CPT | Performed by: PHYSICIAN ASSISTANT

## 2023-09-22 PROCEDURE — 80053 COMPREHEN METABOLIC PANEL: CPT | Performed by: PHYSICIAN ASSISTANT

## 2023-09-22 PROCEDURE — 85025 COMPLETE CBC W/AUTO DIFF WBC: CPT | Performed by: PHYSICIAN ASSISTANT

## 2023-09-22 RX ORDER — DICYCLOMINE HCL 20 MG
20 TABLET ORAL 2 TIMES DAILY
Qty: 20 TABLET | Refills: 0 | Status: SHIPPED | OUTPATIENT
Start: 2023-09-22 | End: 2023-09-27 | Stop reason: SDUPTHER

## 2023-09-22 RX ADMIN — IOHEXOL 100 ML: 350 INJECTION, SOLUTION INTRAVENOUS at 13:25

## 2023-09-22 NOTE — PROGRESS NOTES
Name: Isabel Brennan      : 1985      MRN: [id number]  Encounter Provider: Amanda Westbrook PA-C  Encounter Date: 2023   Encounter department: [facility] PRIMARY [facility]ARE    Assessment & Plan     1. LLQ abdominal pain  Assessment & Plan:  Possible diverticulitis. N/V and LLQ abd pain with NO bowel sounds and firm painful LLQ on exam. Pt in need of further testing that can be completed in a more efficient and faster manner than outpatient. Pt needs CT scan of abdomen and pelvis with CBC and CMP. Orders:  -     Transfer to other facility    2. Nausea and vomiting, unspecified vomiting type  Assessment & Plan:  Possible diverticulitis. N/V and LLQ abd pain with NO bowel sounds and firm painful LLQ on exam. Pt in need of further testing that can be completed in a more efficient and faster manner than outpatient. Pt needs CT scan of abdomen and pelvis with CBC and CMP. Orders:  -     Transfer to other facility    3. Migraine without aura and without status migrainosus, not intractable  Assessment & Plan:  Pt states she is apart of a clinical trial for a migraine medication - Aimovig. Pt missed last dose that was in early September as trial ended. 4. Vertigo  Assessment & Plan:  Continue to do PT exercises and use the cane as needed to help with stability and balance problems. Subjective      HPI  Review of Systems    Current Outpatient Medications on File Prior to Visit   Medication Sig   • ibuprofen (MOTRIN) 200 mg tablet Take 400 mg by mouth every 6 (six) hours as needed for mild pain   • Multiple Vitamins-Minerals (ONE-A-DAY WOMENS PO) Take 1 tablet by mouth in the morning   • rizatriptan (MAXALT) 10 mg tablet Take 1 tablet (10 mg total) by mouth once as needed for migraine May repeat in 2 hours if needed. Max 2/24 hours, 9/month.        Objective     /70   Pulse 63   Resp (!) 100   Ht 5' 3" (1.6 m)   Wt 119 kg (262 lb)   BMI 46.41 kg/m²     Physical Exam  Bashir Garcia Charyl Dancer, PA-C

## 2023-09-22 NOTE — ASSESSMENT & PLAN NOTE
Possible diverticulitis. N/V and LLQ abd pain with NO bowel sounds and firm painful LLQ on exam. Pt in need of further testing and treatment that can be completed in a more efficient and faster manner than outpatient. Pt needs CT scan of abdomen and pelvis , labs, etc and so even though the patient has no insurance we did discuss that getting an valuation now at the emergency room due to her risk factors and exam findings is the best decision to make. Patient will be heading over to [address] ER. ADT placed.

## 2023-09-22 NOTE — ED PROVIDER NOTES
History  Chief Complaint   Patient presents with   • Abdominal Pain     Pt started with LLQ abdominal pain last night. associated symptoms of N/V since Monday. Pt PCP referred to ED due to decreased bowel sounds      This is a 39-year-old female presenting to the emergency department today for left lower quadrant pain. This began approximately 5 days ago and has been intermittent since then. She notes initially it presented as nausea and the patient has transition to a diet of crackers. She had 1 episode of nonbloody nonbilious vomiting but this was numerous days ago. Pain is localized to the left lower quadrant and is nonradiating. She has no associated fevers or chills. She denies any chest pain or shortness of breath. She denies any vaginal bleeding or vaginal discharge. She denies any dysuria, hematuria, or foul-smelling urine. Prior abdominal surgical history significant for cholecystectomy. She denies any flank pain. She had 1 episode of diarrhea but this was nonbloody and occurred numerous days ago. The patient has not had a bowel movement in approximately 2 to 3 days. The patient denies other complaints at this time. History provided by:  Patient   used: No    Abdominal Pain  Pain location:  LLQ  Pain radiates to:  Does not radiate  Pain severity:  Moderate  Onset quality:  Gradual  Duration:  5 days  Timing:  Intermittent  Progression:  Waxing and waning  Chronicity:  New  Relieved by:  Nothing  Worsened by:  Nothing  Ineffective treatments:  None tried  Associated symptoms: anorexia, diarrhea (resolved), nausea and vomiting (resolved)    Associated symptoms: no belching, no chest pain, no chills, no constipation, no cough, no dysuria, no fatigue, no fever, no flatus, no hematuria, no melena, no shortness of breath, no sore throat, no vaginal bleeding and no vaginal discharge        Prior to Admission Medications   Prescriptions Last Dose Informant Patient Reported? Taking? Multiple Vitamins-Minerals (ONE-A-DAY WOMENS PO)  Self Yes Yes   Sig: Take 1 tablet by mouth in the morning   ibuprofen (MOTRIN) 200 mg tablet  Self Yes Yes   Sig: Take 400 mg by mouth every 6 (six) hours as needed for mild pain   rizatriptan (MAXALT) 10 mg tablet  Self No Yes   Sig: Take 1 tablet (10 mg total) by mouth once as needed for migraine May repeat in 2 hours if needed. Max 2/24 hours, 9/month. Facility-Administered Medications: None       Past Medical History:   Diagnosis Date   • Concussion    • Edema of left lower extremity 07/12/2021   • Migraine    • Sleep apnea    • TBI (traumatic brain injury) ([address])     [id number] stated by patient       Past Surgical History:   Procedure Laterality Date   • CHOLECYSTECTOMY     • WISDOM TOOTH EXTRACTION         Family History   Problem Relation Age of Onset   • Cancer Mother    • Heart disease Father      I have reviewed and agree with the history as documented. E-Cigarette/Vaping   • E-Cigarette Use Never User      E-Cigarette/Vaping Substances   • Nicotine No    • THC No    • CBD No    • Flavoring No    • Other No    • Unknown No      Social History     Tobacco Use   • Smoking status: Never   • Smokeless tobacco: Never   Vaping Use   • Vaping Use: Never used   Substance Use Topics   • Alcohol use: Not Currently   • Drug use: Never       Review of Systems   Constitutional: Negative for appetite change, chills, diaphoresis, fatigue and fever. HENT: Negative for sore throat. Eyes: Negative for visual disturbance. Respiratory: Negative for cough, chest tightness, shortness of breath and wheezing. Cardiovascular: Negative for chest pain, palpitations and leg swelling. Gastrointestinal: Positive for abdominal pain, anorexia, diarrhea (resolved), nausea and vomiting (resolved). Negative for constipation, flatus and melena. Genitourinary: Negative for dysuria, hematuria, vaginal bleeding and vaginal discharge.    Musculoskeletal: Negative for neck pain and neck stiffness. Skin: Negative for rash and wound. Neurological: Negative for dizziness, seizures, syncope, weakness, light-headedness, numbness and headaches. Psychiatric/Behavioral: Negative for confusion. All other systems reviewed and are negative. Physical Exam  Physical Exam  Vitals and nursing note reviewed. Constitutional:       General: She is not in acute distress. Appearance: Normal appearance. She is obese. She is not ill-appearing, toxic-appearing or diaphoretic. HENT:      Head: Normocephalic and atraumatic. Nose: Nose normal. No congestion or rhinorrhea. Mouth/Throat:      Mouth: Mucous membranes are moist.      Pharynx: No oropharyngeal exudate or posterior oropharyngeal erythema. Eyes:      General: No scleral icterus. Right eye: No discharge. Left eye: No discharge. Extraocular Movements: Extraocular movements intact. Pupils: Pupils are equal, round, and reactive to light. Cardiovascular:      Rate and Rhythm: Normal rate and regular rhythm. Pulses: Normal pulses. Heart sounds: Normal heart sounds. No murmur heard. No friction rub. No gallop. Pulmonary:      Effort: Pulmonary effort is normal. No respiratory distress. Breath sounds: Normal breath sounds. No stridor. No wheezing, rhonchi or rales. Chest:      Chest wall: No tenderness. Abdominal:      General: Abdomen is flat. There is no distension. Palpations: Abdomen is soft. Tenderness: There is abdominal tenderness. There is no right CVA tenderness, left CVA tenderness, guarding or rebound. Comments: Left lower quadrant tenderness to palpation; otherwise, the patient's abdomen is soft, nontender, nondistended, and without organomegaly; no rebound; nonperitoneal   Musculoskeletal:         General: Normal range of motion. Cervical back: Normal range of motion. No tenderness. Right lower leg: No edema.       Left lower leg: No edema.   Skin:     General: Skin is warm and dry. Capillary Refill: Capillary refill takes less than 2 seconds. Coloration: Skin is not jaundiced or pale. Neurological:      General: No focal deficit present. Mental Status: She is alert and oriented to person, place, and time. Mental status is at baseline. Psychiatric:         Mood and Affect: Mood normal.         Behavior: Behavior normal.         Vital Signs  ED Triage Vitals   Temperature Pulse Respirations Blood Pressure SpO2   09/22/23 1109 09/22/23 1058 09/22/23 1058 09/22/23 1058 09/22/23 1058   98.2 °F (36.8 °C) 77 20 138/67 97 %      Temp Source Heart Rate Source Patient Position - Orthostatic VS BP Location FiO2 (%)   09/22/23 1109 09/22/23 1058 09/22/23 1058 09/22/23 1058 --   Oral Monitor Sitting Left arm       Pain Score       09/22/23 1058       6           Vitals:    09/22/23 1058 09/22/23 1304   BP: 138/67 126/67   Pulse: 77 66   Patient Position - Orthostatic VS: Sitting Lying         Visual Acuity      ED Medications  Medications   iohexol (OMNIPAQUE) 350 MG/ML injection (MULTI-DOSE) 100 mL (100 mL Intravenous Given 9/22/23 1325)       Diagnostic Studies  Results Reviewed     Procedure Component Value Units Date/Time    UA w Reflex to Microscopic w Reflex to Culture [[id number]]  (Abnormal) Collected: 09/22/23 1456    Lab Status: Final result Specimen: Urine, Clean Catch Updated: 09/22/23 1510     Color, UA Light Brown     Clarity, UA Clear     Specific Gravity, UA >=1.050     pH, UA 5.5     Leukocytes, UA Negative     Nitrite, UA Negative     Protein, UA 30 (1+) mg/dl      Glucose, UA Negative mg/dl      Ketones, UA 20 (1+) mg/dl      Urobilinogen, UA <2.0 mg/dl      Bilirubin, UA Negative     Occult Blood, UA Trace    Urine Microscopic [[id number]] Collected: 09/22/23 1456    Lab Status:  In process Specimen: Urine, Clean Catch Updated: 09/22/23 1510    hCG, qualitative pregnancy [[id number]]  (Normal) Collected: 09/22/23 1117 Lab Status: Final result Specimen: Blood from Arm, Left Updated: 09/22/23 1301     Preg, Serum Negative    Comprehensive metabolic panel [[id number]]  (Abnormal) Collected: 09/22/23 1117    Lab Status: Final result Specimen: Blood from Arm, Left Updated: 09/22/23 1140     Sodium 136 mmol/L      Potassium 4.0 mmol/L      Chloride 103 mmol/L      CO2 25 mmol/L      ANION GAP 8 mmol/L      BUN 9 mg/dL      Creatinine 0.86 mg/dL      Glucose 96 mg/dL      Calcium 9.3 mg/dL      AST 12 U/L      ALT 10 U/L      Alkaline Phosphatase 73 U/L      Total Protein 7.6 g/dL      Albumin 4.0 g/dL      Total Bilirubin 0.57 mg/dL      eGFR 85 ml/min/1.73sq m     Narrative:      National Kidney Disease Foundation guidelines for Chronic Kidney Disease (CKD):   •  Stage 1 with normal or high GFR (GFR > 90 mL/min/1.73 square meters)  •  Stage 2 Mild CKD (GFR = 60-89 mL/min/1.73 square meters)  •  Stage 3A Moderate CKD (GFR = 45-59 mL/min/1.73 square meters)  •  Stage 3B Moderate CKD (GFR = 30-44 mL/min/1.73 square meters)  •  Stage 4 Severe CKD (GFR = 15-29 mL/min/1.73 square meters)  •  Stage 5 End Stage CKD (GFR <15 mL/min/1.73 square meters)  Note: GFR calculation is accurate only with a steady state creatinine    Lipase [[id number]]  (Abnormal) Collected: 09/22/23 1117    Lab Status: Final result Specimen: Blood from Arm, Left Updated: 09/22/23 1140     Lipase 10 u/L     CBC and differential [[id number]]  (Abnormal) Collected: 09/22/23 1117    Lab Status: Final result Specimen: Blood from Arm, Left Updated: 09/22/23 1123     WBC 7.45 Thousand/uL      RBC 4.42 Million/uL      Hemoglobin 13.2 g/dL      Hematocrit 39.7 %      MCV 90 fL      MCH 29.9 pg      MCHC 33.2 g/dL      RDW 12.5 %      MPV 8.7 fL      Platelets 257 Thousands/uL      nRBC 0 /100 WBCs      Neutrophils Relative 67 %      Immat GRANS % 0 %      Lymphocytes Relative 27 %      Monocytes Relative 5 %      Eosinophils Relative 1 %      Basophils Relative 0 % Neutrophils Absolute 4.94 Thousands/µL      Immature Grans Absolute 0.02 Thousand/uL      Lymphocytes Absolute 2.04 Thousands/µL      Monocytes Absolute 0.38 Thousand/µL      Eosinophils Absolute 0.04 Thousand/µL      Basophils Absolute 0.03 Thousands/µL                  CT abdomen pelvis with contrast   Final Result by Garth Campos MD (09/22 1419)      1. No acute findings in the abdomen/pelvis. 2. 3 mm subpleural nodule in the left lower lobe laterally. Based on current Fleischner Society 2017 Guidelines on incidental pulmonary nodule, no routine follow-up is needed if the patient is low risk. If the patient is high risk, optional follow-up    chest CT at 12 months can be considered. Workstation performed: [id number]                    Procedures  Procedures         ED Course                               SBIRT 22yo+    Flowsheet Row Most Recent Value   Initial Alcohol Screen: US AUDIT-C     1. How often do you have a drink containing alcohol? 0 Filed at: 09/22/2023 1108   2. How many drinks containing alcohol do you have on a typical day you are drinking? 0 Filed at: 09/22/2023 1108   3b. FEMALE Any Age, or MALE 65+: How often do you have 4 or more drinks on one occassion? 0 Filed at: 09/22/2023 1108   Audit-C Score 0 Filed at: 09/22/2023 1108   DIANA: How many times in the past year have you. .. Used an illegal drug or used a prescription medication for non-medical reasons? Never Filed at: 09/22/2023 1108                    Medical Decision Making  This is a 27-year-old female presenting to the emergency department today for left lower quadrant pain. This is associated with 1 episode of nonbloody nonbilious vomiting and nausea. It is nonradiating and not associated with urinary symptoms. She saw her PCP earlier today whose note I reviewed. Vital signs are reassuring. On physical examination, the patient has left lower quadrant tenderness to palpation without any evidence of peritonitis.   No urinary tract infection. No leukocytosis. CMP is reassuring. Negative lipase. Negative pregnancy test.  CT abdomen and pelvis shows no acute findings in the abdomen and pelvis but the patient does have a pulmonary nodule. Made patient aware of pulmonary nodule. The patient is stable for discharge at this time. Bentyl sent to the patient's pharmacy. Follow-up with GI as needed. Return to the emergency department for worsening symptoms. Strict return precautions were given. Recommend PCP follow-up as soon as possible. The patient and/or patient's proxy verify their understanding and agree to the plan at this time. All questions answered to the patient and/or their proxy's satisfaction. All labs reviewed and utilized in the medical decision making process (if labs were ordered). Portions of the record may have been created with voice recognition software.  Occasional wrong word or "sound a like" substitutions may have occurred due to the inherent limitations of voice recognition software.  Read the chart carefully and recognize, using context, where substitutions have occurred. LLQ pain: undiagnosed new problem with uncertain prognosis  Lung nodule: chronic illness or injury  Amount and/or Complexity of Data Reviewed  External Data Reviewed: notes. Labs: ordered. Decision-making details documented in ED Course. Radiology: ordered. Decision-making details documented in ED Course. Risk  Prescription drug management.           Disposition  Final diagnoses:   LLQ pain   Lung nodule     Time reflects when diagnosis was documented in both MDM as applicable and the Disposition within this note     Time User Action Codes Description Comment    9/22/2023  3:23 PM Corazon Haro [R10.32] LLQ pain     9/22/2023  3:25 PM Suad Haro [R91.1] Lung nodule       ED Disposition     ED Disposition   Discharge    Condition   Stable    Date/Time   Fri Sep 22, 2023  3:23 PM    Comment [facility] discharge to home/self care. Follow-up Information     Follow up With Specialties Details Why Contact Info Additional Information    Charmayne Fruits, MD Family Medicine Schedule an appointment as soon as possible for a visit   [address]  [address] [facility] [phone] [address] [facility] Emergency Medicine Go to  If symptoms worsen [address] [address]  [address] [facility] Emergency Department, [address] [facility] [place], [place], [address] [facility] [address] [address] [facility] Schedule an appointment as soon as possible for a visit   [address]  [place] [address] [facility],[address] [address] [address] [place], [address] [place], [place], [address] [phone]          Discharge Medication List as of 9/22/2023  3:25 PM      START taking these medications    Details   dicyclomine (BENTYL) 20 mg tablet Take 1 tablet (20 mg total) by mouth 2 (two) times a day, Starting Fri 9/22/2023, Normal         CONTINUE these medications which have NOT CHANGED    Details   ibuprofen (MOTRIN) 200 mg tablet Take 400 mg by mouth every 6 (six) hours as needed for mild pain, Historical Med      Multiple Vitamins-Minerals (ONE-A-DAY WOMENS PO) Take 1 tablet by mouth in the morning, Historical Med      rizatriptan (MAXALT) 10 mg tablet Take 1 tablet (10 mg total) by mouth once as needed for migraine May repeat in 2 hours if needed.  Max 2/24 hours, 9/month., Starting Mon 4/10/2023, Normal                 PDMP Review     None          ED Provider  Electronically Signed by           Suzanne Almanzar PA-C  09/22/23 3180

## 2023-09-22 NOTE — PROGRESS NOTES
Name: Gloria Baumann      : 1985      MRN: [id number]  Encounter Provider: Miky Crowell PA-C  Encounter Date: 2023   Encounter department: [facility] PRIMARY CARE    Assessment & Plan     1. LLQ abdominal pain  Assessment & Plan:  Possible diverticulitis. N/V and LLQ abd pain with NO bowel sounds and firm painful LLQ on exam. Pt in need of further testing and treatment that can be completed in a more efficient and faster manner than outpatient. Pt needs CT scan of abdomen and pelvis , labs, etc and so even though the patient has no insurance we did discuss that getting an valuation now at the emergency room due to her risk factors and exam findings is the best decision to make. Patient will be heading over to [address] ER. ADT placed. Orders:  -     Transfer to other facility    2. Nausea and vomiting, unspecified vomiting type  Assessment & Plan:  Possible diverticulitis. N/V and LLQ abd pain with NO bowel sounds and firm painful LLQ on exam. Pt in need of further testing that can be completed in a more efficient and faster manner than outpatient. Pt needs CT scan of abdomen and pelvis with CBC and CMP. Orders:  -     Transfer to other facility    3. Migraine without aura and without status migrainosus, not intractable  Assessment & Plan:  Pt states she is apart of a clinical trial for a migraine medication - Aimovig. Pt missed last dose that was in early September as trial ended. 4. Vertigo  Assessment & Plan:  Continue to do PT exercises and use the cane as needed to help with stability and balance problems. BMI Counseling: Body mass index is 46.41 kg/m². The BMI is above normal. Nutrition recommendations include decreasing portion sizes, encouraging healthy choices of fruits and vegetables, decreasing fast food intake, consuming healthier snacks and limiting drinks that contain sugar. Exercise recommendations include exercising 3-5 times per week.  No pharmacotherapy was ordered. Rationale for BMI follow-up plan is due to patient being overweight or obese. Depression Screening and Follow-up Plan: Patient was screened for depression during today's encounter. They screened negative with a PHQ-2 score of 2. Subjective      Pt presents to the office today for "nausea" for the past 5 days and "lower abdominal pain" since last night. She states that she woke up Monday morning and vomited first thing. She has been feeling nauseus ever since and has not hand any more episodes of vomiting. She has been eating a bland diet including crackers and water. She has tried gingerale to settle her stomach but did not find relief from this. Pt states that she did take a sleeping pill on Sunday night and thought that may have been correlated to her symptoms. She took it Sunday and then again Tuesday night but had symptoms throughout the entire week. Her lower abdominal pain started last night. It is localized to the left lower side and sometimes to her lower left back. She states that she could not sleep on that side and it would bother her lower back when laying flat. She She states that she had diarrhea on Tuesday morning but denies mucus or blood in stool. She has not had a BM since then. She feels that she could go but nothing comes out. She has not taken anything for the pain because she is worried she will vomit. She denies fevers, chills, or any urinary symptoms like frequency, urgency, or blood in the urine. Review of Systems   Constitutional: Positive for appetite change and fatigue. Negative for chills and fever. HENT: Negative. Eyes: Negative. Respiratory: Negative. Negative for chest tightness and shortness of breath. Cardiovascular: Negative. Negative for chest pain, palpitations and leg swelling. Gastrointestinal: Positive for abdominal pain, constipation, diarrhea, nausea and vomiting. Negative for blood in stool.         No BM since Tuesday may be due to crackers and water   Endocrine: Negative. Genitourinary: Negative. Negative for difficulty urinating, dysuria, frequency and urgency. Musculoskeletal: Positive for back pain. Skin: Negative. Allergic/Immunologic: Negative. Neurological: Positive for dizziness, syncope, light-headedness and headaches. Negative for weakness. Brief moment where she felt like shed fall over this morning while getting dressed. Hematological: Negative. Psychiatric/Behavioral: Negative. Current Outpatient Medications on File Prior to Visit   Medication Sig   • ibuprofen (MOTRIN) 200 mg tablet Take 400 mg by mouth every 6 (six) hours as needed for mild pain   • Multiple Vitamins-Minerals (ONE-A-DAY WOMENS PO) Take 1 tablet by mouth in the morning   • rizatriptan (MAXALT) 10 mg tablet Take 1 tablet (10 mg total) by mouth once as needed for migraine May repeat in 2 hours if needed. Max 2/24 hours, 9/month. Objective     /70   Pulse 63   Resp (!) 100   Ht 5' 3" (1.6 m)   Wt 119 kg (262 lb)   BMI 46.41 kg/m²     Physical Exam  Vitals and nursing note reviewed. Constitutional:       General: She is not in acute distress. Appearance: She is well-developed. She is obese. She is not diaphoretic. Comments: Body odor is present. HENT:      Head: Normocephalic and atraumatic. Mouth/Throat:      Dentition: Gingival swelling and dental caries present. Eyes:      General:         Right eye: No discharge. Left eye: No discharge. Conjunctiva/sclera: Conjunctivae normal.   Neck:      Vascular: No carotid bruit. Cardiovascular:      Rate and Rhythm: Normal rate and regular rhythm. Pulses:           Carotid pulses are 2+ on the right side and 2+ on the left side. Heart sounds: Normal heart sounds. No murmur heard. No friction rub. No gallop. Pulmonary:      Effort: Pulmonary effort is normal. No respiratory distress.       Breath sounds: Normal breath sounds. No wheezing or rales. Abdominal:      General: Abdomen is protuberant. Bowel sounds are absent. Palpations: Abdomen is rigid. Tenderness: There is abdominal tenderness in the left lower quadrant. Comments: NO bowel sounds present. Dull to percussion. Tender to light palpation in LLQ. LLQ underneath fat layer of abdomen when examination from the side does reveal a very rigid firm left lower quadrant outer musculature. Musculoskeletal:      Cervical back: Neck supple. Skin:     General: Skin is warm and dry. Neurological:      General: No focal deficit present. Mental Status: She is alert and oriented to person, place, and time.    Psychiatric:         Mood and Affect: Mood normal.         Behavior: Behavior normal.         Judgment: Judgment normal.       Julia Escamilla PA-C

## 2023-09-22 NOTE — DISCHARGE INSTRUCTIONS
Please return to the emergency department for worsening symptoms including chest pain, shortness of breath, dizziness, lightheadedness, fever greater than 103, severe pain, inability to walk, fainting episodes, etc.. Please follow-up with your family practice provider as soon as possible. I have sent medications over to the pharmacy for your symptoms. Please take as directed. If symptoms do not improve, you may follow-up with a gastroenterologist.  I have given you a referral.  Follow-up with your PCP for lung nodule.

## 2023-09-22 NOTE — PATIENT INSTRUCTIONS
Problem List Items Addressed This Visit          Digestive    Nausea and vomiting     Possible diverticulitis. N/V and LLQ abd pain with NO bowel sounds and firm painful LLQ on exam. Pt in need of further testing that can be completed in a more efficient and faster manner than outpatient. Pt needs CT scan of abdomen and pelvis with CBC and CMP. Relevant Orders    Transfer to other facility (Completed)       Cardiovascular and Mediastinum    Migraine     Pt states she is apart of a clinical trial for a migraine medication - Aimovig. Pt missed last dose that was in early September as trial ended. Other    Vertigo     Continue to do PT exercises and use the cane as needed to help with stability and balance problems. LLQ abdominal pain - Primary     Possible diverticulitis. N/V and LLQ abd pain with NO bowel sounds and firm painful LLQ on exam. Pt in need of further testing that can be completed in a more efficient and faster manner than outpatient. Pt needs CT scan of abdomen and pelvis with CBC and CMP.           Relevant Orders    Transfer to other facility (Completed)

## 2023-09-22 NOTE — ASSESSMENT & PLAN NOTE
Pt states she is apart of a clinical trial for a migraine medication - Aimovig. Pt missed last dose that was in early September as trial ended.

## 2023-09-22 NOTE — ASSESSMENT & PLAN NOTE
Possible diverticulitis. N/V and LLQ abd pain with NO bowel sounds and firm painful LLQ on exam. Pt in need of further testing that can be completed in a more efficient and faster manner than outpatient. Pt needs CT scan of abdomen and pelvis with CBC and CMP.

## 2023-09-27 ENCOUNTER — OFFICE VISIT (OUTPATIENT)
Dept: FAMILY MEDICINE CLINIC | Facility: CLINIC | Age: 38
End: 2023-09-27

## 2023-09-27 VITALS
HEART RATE: 80 BPM | TEMPERATURE: 97.5 F | HEIGHT: 63 IN | WEIGHT: 275.6 LBS | DIASTOLIC BLOOD PRESSURE: 72 MMHG | SYSTOLIC BLOOD PRESSURE: 118 MMHG | BODY MASS INDEX: 48.83 KG/M2

## 2023-09-27 DIAGNOSIS — R10.31 RLQ ABDOMINAL PAIN: ICD-10-CM

## 2023-09-27 DIAGNOSIS — T74.01XA: ICD-10-CM

## 2023-09-27 DIAGNOSIS — R10.32 LLQ PAIN: ICD-10-CM

## 2023-09-27 DIAGNOSIS — Z59.89 DOES NOT HAVE HEALTH INSURANCE: ICD-10-CM

## 2023-09-27 DIAGNOSIS — R10.32 LLQ ABDOMINAL PAIN: Primary | ICD-10-CM

## 2023-09-27 PROCEDURE — 99213 OFFICE O/P EST LOW 20 MIN: CPT | Performed by: PHYSICIAN ASSISTANT

## 2023-09-27 RX ORDER — DICYCLOMINE HCL 20 MG
20 TABLET ORAL 2 TIMES DAILY
Qty: 20 TABLET | Refills: 0 | Status: CANCELLED | OUTPATIENT
Start: 2023-09-27

## 2023-09-27 RX ORDER — DICYCLOMINE HCL 20 MG
20 TABLET ORAL 3 TIMES DAILY
Qty: 20 TABLET | Refills: 0 | Status: SHIPPED | OUTPATIENT
Start: 2023-09-27

## 2023-09-27 SDOH — ECONOMIC STABILITY - INCOME SECURITY: OTHER PROBLEMS RELATED TO HOUSING AND ECONOMIC CIRCUMSTANCES: Z59.89

## 2023-09-27 NOTE — ASSESSMENT & PLAN NOTE
Pt does not have insurance after losing her job. Pt has been trying to get disability and insurance through medicare but the process has taken a long time. Pt is interested in speaking with  social workers to help get this process completed especially in light of her recent and increased need for medical attention.
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
## 2023-09-27 NOTE — ASSESSMENT & PLAN NOTE
Unclear etiology due to ER work up being negative including CT imaging. Symptoms have improved in general.. Potentially a viral gastroenteritis. Pts pain has improved since ER visit on 9/22 but has moved to her RLQ. Pts Bentyl was increased to 20 mg 3 times per day preferably before meals to see if this helps with the pain. Pt is referred to GI and is given the number to call. A social work order was also placed to help her obtain medical insurance.

## 2023-09-27 NOTE — ASSESSMENT & PLAN NOTE
Unclear etiology due to ER work up being negative including CT imaging. Symptoms have improved in general. Potentially a viral gastroenteritis. Pts pain has improved since ER visit on 9/22 but has moved to her RLQ. Pts Bentyl was increased to 20 mg 3 times per day preferably before meals to see if this helps with the pain. Pt is referred to GI and is given the number to call. A social work order was also placed to help her obtain medical insurance.

## 2023-09-28 ENCOUNTER — PATIENT OUTREACH (OUTPATIENT)
Dept: FAMILY MEDICINE CLINIC | Facility: CLINIC | Age: 38
End: 2023-09-28

## 2023-09-28 NOTE — PROGRESS NOTES
OP CM called to pt and LM in regards to consult for pt not having insurance. Email sent to pt as well with my contact info.

## 2023-09-29 ENCOUNTER — PATIENT OUTREACH (OUTPATIENT)
Dept: FAMILY MEDICINE CLINIC | Facility: CLINIC | Age: 38
End: 2023-09-29

## 2023-09-29 DIAGNOSIS — Z59.89 DOES NOT HAVE HEALTH INSURANCE: Primary | ICD-10-CM

## 2023-09-29 PROBLEM — Z59.71 DOES NOT HAVE HEALTH INSURANCE: Status: ACTIVE | Noted: 2023-09-27

## 2023-09-29 SDOH — ECONOMIC STABILITY - INCOME SECURITY: OTHER PROBLEMS RELATED TO HOUSING AND ECONOMIC CIRCUMSTANCES: Z59.89

## 2023-09-29 NOTE — PATIENT INSTRUCTIONS
Problem List Items Addressed This Visit          Other    LLQ pain - Primary     Unclear etiology due to ER work up being negative including CT imaging. Symptoms have improved in general. Potentially a viral gastroenteritis. Pts pain has improved since ER visit on 9/22 but has moved to her RLQ. Pts Bentyl was increased to 20 mg 3 times per day preferably before meals to see if this helps with the pain. Pt is referred to GI and is given the number to call. A social work order was also placed to help her obtain medical insurance. Relevant Medications    dicyclomine (BENTYL) 20 mg tablet    Other Relevant Orders    Ambulatory Referral to Gastroenterology    Ambulatory Referral to Social Work Care Management Program    RLQ abdominal pain     Unclear etiology due to ER work up being negative including CT imaging. Symptoms have improved in general.. Potentially a viral gastroenteritis. Pts pain has improved since ER visit on 9/22 but has moved to her RLQ. Pts Bentyl was increased to 20 mg 3 times per day preferably before meals to see if this helps with the pain. Pt is referred to GI and is given the number to call. A social work order was also placed to help her obtain medical insurance. Relevant Orders    Ambulatory Referral to Gastroenterology    Ambulatory Referral to Social Work Care Management Program    Does not have health insurance     Pt does not have insurance after losing her job. Pt has been trying to get disability and insurance through medicare but the process has taken a long time. Pt is interested in speaking with  social workers to help get this process completed especially in light of her recent and increased need for medical attention.

## 2023-09-29 NOTE — PROGRESS NOTES
OP CM called to pt again in regards to consult for not having insurance. Pt did not answer and does not have MyChart so email sent as well. Referral also placed for [facility] to see if she can also get in contact with pt to assist with medicaid.

## 2023-10-05 ENCOUNTER — PATIENT OUTREACH (OUTPATIENT)
Dept: FAMILY MEDICINE CLINIC | Facility: CLINIC | Age: 38
End: 2023-10-05

## 2023-10-05 NOTE — PROGRESS NOTES
[facility] 165 completed a chart review prior to contacting patient. Called patient to discuss referral received. Referral need:  Medically uninsured     Patient identity verify by name and date of birth. Introduced myself and explained my role. Patient agrees to Case management out reach coordination services. At the time of call pt stated she was sick and was going to call  back and hang up the phone. CMOC will give patient time to call back. If no return call is receive, CMOC will try a second attempt to reach patient in about 2 weeks.     Anticipated next contact date: 10/19/23

## 2023-10-19 ENCOUNTER — PATIENT OUTREACH (OUTPATIENT)
Dept: FAMILY MEDICINE CLINIC | Facility: CLINIC | Age: 38
End: 2023-10-19

## 2023-10-19 NOTE — PROGRESS NOTES
[address] [place] [address] 165 completed a chart review prior to contacting patient. Called patient to follow up on referral received. Referral need: Medically uninsured     Unable to reach patient, left a message requesting a call back. My chart unable to reach message also sent. If no return call is received, [address] [place] [address] 165 will make a second attempt to reach patient in about 2 weeks.      Anticipated next contact date: 11/2/23

## 2023-10-23 ENCOUNTER — PATIENT OUTREACH (OUTPATIENT)
Dept: FAMILY MEDICINE CLINIC | Facility: CLINIC | Age: 38
End: 2023-10-23

## 2023-10-23 NOTE — PROGRESS NOTES
Incoming return call received from patient. Pt apologized for missing CMOC's call, per patient she is  in the process of applying for social security disability and has been very busy. [facility] offer patient assistance applying for [employer], pt currently uninsured. Per patient she is hoping that she will be awarded  Medicare if she is found eligible for social security disability. Pt has an appt with her  this afternoon and will discuss if applying for medicaid is appropriate at this time. Pt will be calling CMOC back  to update on her decision.     CMOC will remain available to assist patient as needed

## 2023-10-30 ENCOUNTER — PATIENT OUTREACH (OUTPATIENT)
Dept: FAMILY MEDICINE CLINIC | Facility: CLINIC | Age: 38
End: 2023-10-30

## 2023-10-30 NOTE — PROGRESS NOTES
[facility] completed a chart review prior to contacting patient. Called patient to  follow up on referral received. Referral need:  Medicaid    Pt was going to discuss applying for medicaid with  helping her with Social Security disability. Pt unsure  if she should apply for medicaid or wait to get Medicare awarded if she is find eligible for SSD. Unable to reach patient, left a message requesting a call back if she is still interest in applying for Medical Assistance. CMOC will remain available may patient decide to apply for medicaid.

## 2023-11-02 ENCOUNTER — TELEPHONE (OUTPATIENT)
Dept: NEUROLOGY | Facility: CLINIC | Age: 38
End: 2023-11-02

## 2023-11-02 NOTE — TELEPHONE ENCOUNTER
Please see telephone encounter from 8/24/2023 as Gato Rajan did attempt to reach out to patient multiple times to discuss [employer] PAP application. She also mailed it to her. Please let her know this and see if she did receive the application. If not, please have her reach back out to [place].

## 2023-11-02 NOTE — TELEPHONE ENCOUNTER
Called and advised pt of the below. She verbalized clear understanding. Pt states that she cannot recall if she received PAP application. Advised pt that I will forward this enc to [place]. Pt verbalized understanding.

## 2023-11-02 NOTE — TELEPHONE ENCOUNTER
Recd vm 10/31/taken off 11/2    Hello. My name is Al Coffman. My birthday is February 29th. 1985. I am calling to request a prescription from [facility]. we had discussed a possible solution while I attempt to  get my disability claim in or, or get qualified for anything at no cost. I'm sorry, I can't remember the medication, but I would like to request that prescription be called in as my migraines are getting much worse.     [phone]

## 2023-11-02 NOTE — TELEPHONE ENCOUNTER
MSW attempted to reach patient at [phone]. No answer. MSW left a message requesting callback. Awaiting same. [employer] sent to patient in an attempt to reach her as well.

## 2023-11-03 ENCOUNTER — TELEPHONE (OUTPATIENT)
Dept: NEUROLOGY | Facility: CLINIC | Age: 38
End: 2023-11-03

## 2023-11-03 NOTE — TELEPHONE ENCOUNTER
MSW phoned patient at [phone] and was able to reach her. MSW advised that this writer had been trying to reach her back in August about the [employer] PAP and that this writer was unsuccessful and had to send an unable to reach letter. Patient apologized for not calling back sooner, cited that she has memory issues and that her mother handles her mail. Patient stated that her mom did receive this writer's letter along with the application, but put it aside and forgot about it. Patient stated that she recently received it from her mother, but has not started to work on same yet as she is sick. Patient requested to call this writer back next week once she is feeling better to discuss the program and application process. MSW offered to contact her mother to get the application process moving along more quickly, but patient stated that she prefers to handle same herself and will call this writer back next week.

## 2023-11-03 NOTE — TELEPHONE ENCOUNTER
Patient called at 10:55 am  to cancel their 11/3/23 appointment at 12:30 pm for a 3 month follow up       Offered the patient a VV Patient decline at this time       Reason : Patient is very ill and not feeling up to a VV today

## 2023-11-07 NOTE — TELEPHONE ENCOUNTER
MSW had yet to hear back from patient, so MSW placed call to her this date at [phone]. Patient stated that she still was not feeling well, stated that she had a really bad headache. Patient asked if she could call this writer back tomorrow. MSW will await patient's call.

## 2023-11-13 ENCOUNTER — PATIENT OUTREACH (OUTPATIENT)
Dept: FAMILY MEDICINE CLINIC | Facility: CLINIC | Age: 38
End: 2023-11-13

## 2023-11-13 NOTE — LETTER
11/13/23    Dear Roma Warner,    I am a  with [address]. I have made several attempts to contact you by phone. Please call me back at your earliest convenient time so that I can assist you with your care needs. My direct number is [phone]    Sincerely,         Francheska Núñez.  Epifanio Hidden, [address] [facility]   Outpatient Care Management  [address], [address] [place] [address] Street  Phone: [address], [address]

## 2023-11-21 ENCOUNTER — TELEPHONE (OUTPATIENT)
Dept: SLEEP CENTER | Facility: CLINIC | Age: 38
End: 2023-11-21

## 2023-11-27 ENCOUNTER — PATIENT OUTREACH (OUTPATIENT)
Dept: FAMILY MEDICINE CLINIC | Facility: CLINIC | Age: 38
End: 2023-11-27

## 2023-11-27 ENCOUNTER — TELEPHONE (OUTPATIENT)
Dept: NEUROLOGY | Facility: CLINIC | Age: 38
End: 2023-11-27

## 2023-11-27 NOTE — PROGRESS NOTES
[address] [facility] 165 completed a chart review prior to contacting patient. Called patient to follow up on her decision to complete a medical assistance application. Three unsuccessfully attempts made to reach patient. Will close referral at this time due to lack of patient engagement. Anticipated next contact date: N.A referral closed.

## 2023-11-27 NOTE — TELEPHONE ENCOUNTER
Called patient and left voicemail to confirm their upcoming appointment with Dr. Favio Carreon. Informed patient about arriving in the [place] location 15 minutes prior to appointment to get checked in and go over chart.

## 2023-12-07 ENCOUNTER — TELEPHONE (OUTPATIENT)
Dept: NEUROLOGY | Facility: CLINIC | Age: 38
End: 2023-12-07

## 2023-12-07 NOTE — TELEPHONE ENCOUNTER
Called patient and left voicemail to confirm their upcoming appointment with Dr. Jessenia Linton. Informed patient about arriving in the [place] location 15 minutes prior to appointment to get checked in and go over chart.

## 2023-12-14 ENCOUNTER — OFFICE VISIT (OUTPATIENT)
Dept: NEUROLOGY | Facility: CLINIC | Age: 38
End: 2023-12-14

## 2023-12-14 VITALS
TEMPERATURE: 98.4 F | DIASTOLIC BLOOD PRESSURE: 88 MMHG | HEART RATE: 88 BPM | SYSTOLIC BLOOD PRESSURE: 169 MMHG | HEIGHT: 63 IN | BODY MASS INDEX: 47.84 KG/M2 | WEIGHT: 270 LBS

## 2023-12-14 DIAGNOSIS — G43.009 MIGRAINE WITHOUT AURA AND WITHOUT STATUS MIGRAINOSUS, NOT INTRACTABLE: Primary | ICD-10-CM

## 2023-12-14 PROCEDURE — 99215 OFFICE O/P EST HI 40 MIN: CPT | Performed by: PSYCHIATRY & NEUROLOGY

## 2023-12-14 NOTE — PROGRESS NOTES
[facility] Consult - Follow up   PATIENT:  Ana Whitehead  MRN:  [id number]  :  1985  DATE OF SERVICE:  2023  REFERRED BY: No ref. provider found  PMD: Vicente Cohen MD    Assessment/Plan:   Ana Whitehead is a very pleasant  43 y.o. female with a past medical history that includes migraine, PTSD, BMI over 45, vertigo, B12 deficiency, vitamin-D deficiency, insomnia, anxiety, depression, mild degenerative disc disease C4-5, class IV Mallampati score referred here for evaluation of headache. My initial evaluation 3/29/2022     Migraine without aura and without status migrainosus, not intractable  History of possible concussion - resolved  Features idiopathic intracranial hypertension based on imaging without known papilledema   BUCKY not on CPAP (2023, SOPHIE 8.5, O2 down to 85%) -cannot afford to treat without insurance yet  We discussed her history of multiple possible concussions although we discussed it is also difficult to diagnose concussion definitively and how posttraumatic headache with migrainous features can often have similar presentation. Please see EMR and HPI for details. Most recently on 2022, she was seated on a couch when the back of the couch collapsed causing her to fall and hit the right side of her head on an end table she thinks, does not remember hitting her head, but had a red conchis at the right cheek area. She reports she does not remember a few seconds of time which we discussed can happen as a stress reaction are related to concussion and had progressively worsening up chronic vertigo and posttraumatic headache with migrainous features. She was evaluated emergency department where noncontrast head CT was unremarkable for acute pathology and she subsequently followed up with primary care provider who kept her out of work and asked her to follow up with Neurology.   - As of 2022:  She reports following the injury vertigo actually got much better, she has chronic daily back on headaches for years dating back to at least 03/2020 that is worse about 1 day a week for which she is not interested in prescription preventative, did start trial of rizatriptan for abortive. She also has history of anxiety, depression and some symptoms of posttraumatic stress with functional neurologic disorder with hyper startle response, avoidance behavior and wearing sunglasses indoors, intrusion symptoms, negative impact on cognition and mood and arousal and reactivity changes. Also some symptoms of deconditioning and maladaptive coping. We discussed continue follow-up with PCP and recommended establishing care with a counselor for which I will have our  see if she can help her with a list.  She was reassured by this information and we discussed gradual return to normalcy. Referral to sleep medicine for possible sleep apnea contributing to symptoms. - as of 5/6/2022: She reports she continues to have near daily headaches that are mild about 3/10 and worse about 2 times in the past month for which she did not yet try rizatriptan, however did go to ED today instead and sumatriptan plus IV fluids and Zofran helped. She is still working on following up with Sleep Medicine, eye doctor, counseling. She is now interested in prescription preventative and will start trial of amitriptyline with gradual titration up and again recommended taking rizatriptan next migraine.  - as of 8/19/2022: She reports she is doing a lot better, continues to have daily headaches that are about 3/10, but reports improvement on amitriptyline 50 mg which she started end of June, about 6 weeks ago. None more than a 5/10 in 2 months, no longer having daily migraines, so hasnt needed rizatriptan. She is continuing to re-expose herself to TV, screens etc slowly. - as of 1/10/2023: Follow-up with [facility]ry on topiramate 25 mg / 50 mg.   Continues to have PT for dizziness. - as of 4/10/2023: Since last visit tried and failed amitriptyline although it helped with migraine she had side effects. She stopped topiramate since last visit once she started Aimovig. She reports improvement since last visit on aimovig monthly  thus far after one dose with reduction in migraines. (On it through the pharmaceutical trial). Trying to get out more which is exhausting. Saw sleep medicine last month and home sleep study ordered out-of-pocket. Mood overall is better and gave some more resources. - as of 7/24/2023: She reports doing significantly better on Aimovig and the frequency has gone down to at most once a week and severity has gone down to where she finally feels functional.  Breakthrough maybe once a week she takes ibuprofen and it helps bring the pain down again. Baseline 1 out of 10 and we discussed on retrospective review I do see some findings consistent with IIH and at any time if she would like could consider lumbar puncture and addition of acetazolamide/Diamox which she would prefer to hold off at this time although we discussed I will be certainly more forceful if she does have progressive vision changes at her next eye exam as I suspect it could be due to untreated sleep apnea contributing to the possible idiopathic intracranial hypertension although no known papilledema  - as of 12/14/2023: Unfortunately she has not followed up with eye doctor still and therefore unknown if she has papilledema and we discussed how important this is. She reports she has been depressed due to the time of year and other circumstances including deaths in the family which kept her in bed more than usual, little better now and wanting to work with social workers again through PCP to try and obtain insurance and through asked to try and get Aimovig back on board since it was stopped once the study ended.   Currently moderate to severe headaches approximately 1-2 a month and milder headaches 2-3 a week. Ibuprofen typically helps and she saves rizatriptan for if severe and also helps but expensive. She is not currently interested in prescription preventative or abortive additions, although we discussed acetazolamide if needed, she worries about the tingling she had on topiramate. She continues to not treat her sleep apnea and we discussed I absolutely recommend CPAP and again discussed morbidity and mortality if remains untreated. Workup:  - noncontrast head CT 03/04/2020: No acute intracranial abnormality.  - CTA head and neck with without contrast 11/02/2021: Normal CT angiogram of the head and neck. - MRI brain IAC with without contrast 2/9/22: No IAC or CP angle pathology. Unremarkable MRI of the brain. *We discussed as of my retrospective review 7/24/2023 and 12/14/2023 she has a partially empty sella, prominent optic nerve sheath bilaterally with tortuosity bilaterally, tight ventricles, cerebellar tonsils overlie the foramen magnum with tight junction  - noncontrast head CT 03/17/2022: No acute intracranial abnormality. - noncontrast head CT 05/06/2022: No acute intracranial abnormality. -CTA head and neck 05/06/2022: Negative CTA head and neck for large vessel occlusion, dissection, aneurysm, or high-grade stenosis. Preventative:  - we discussed headache hygiene and lifestyle factors that may improve headaches  -  aimovig monthly-initially was through the study and no longer getting it since August 2023 and working with her  to try and get it back on board. Discussed proper use, possible side effects and risks. - Past/ failed/contraindicated: Amitriptyline, topiramate - tingling   - future options: Alternative CGRP med    Abortive:  - discussed not taking over-the-counter or prescription pain medications more than 3 days per week to prevent medication overuse/rebound headache  -     rizatriptan (MAXALT) 10 MG tablet;  Take 1 tablet (10 mg total) by mouth once as needed for migraine May repeat in 2 hours if needed. Max 2/24 hours, 9/month. Discussed proper use, possible side effects and risks. - On through other providers: back up: steroids on hand if needed for back up, Ibuprofen, meclizine  - Past/ failed/contraindicated:  OTC meds  - past:  Has tolerated steroids in the past, migraine cocktails have brought pain down, sumatriptan in ED helped with IV fluids and Zofran  - future options:  prochlorperazine, Toradol IM or p.o., could consider trial for 5 days of Depakote or dexamethasone for prolonged migraine, ubrelvy, reyvow, nurtec      Patient instructions      Try and sleep on your side     Follow up with eye doctor for dilated eye exam and please let me know if they see any signs of papilledema or swelling behind your eyes and certainly go to the ER if you have any acute vision changes for lumbar puncture which could decrease pressure right away. We discussed I am not officially diagnosing you with idiopathic intracranial hypertension however you do have some subtle findings on imaging that could suggest this may be slightly elevated due to the untreated sleep apnea. If you ever wanted we could add acetazolamide/Diamox at any time to treat, get a lumbar puncture outpatient any time to treat or further work up, and do not trust that the eye doctor will send me the report. - "Rewire Your Anxious Brain: How to Use the Neuroscience of Fear to End Anxiety, Panic, and Worry" By Colette Diaz PhD    Headache/migraine treatment:   Abortive medications (for immediate treatment of a headache): It is ok to take ibuprofen, acetaminophen or naproxen (Advil, Tylenol,  Aleve, Excedrin) if they help your headaches you should limit these to No more than 3 times a week to avoid medication overuse/rebound headaches. For your more moderate to severe migraines take this medication early   Maxalt (rizatriptan) 10mg tabs - take one at the onset of headache.  May repeat one time after 1-2 hours if pain has not resolved. (Max 2 a day and 9 a month)       Prescription preventive medications for headaches/migraines   (to take every day to help prevent headaches - not to take at the time of headache):  [x]Aimovig 1 shot monthly if we can get it back         *Typically these types of medications take time untill you see the benefit, although some may see improvement in days, often it may take weeks, especially if the medication is being titrated up to a beneficial level. Please contact us if there are any concerns or questions regarding the medication. Lifestyle Recommendations:  [x] SLEEP - Maintain a regular sleep schedule: Adults need at least 7-8 hours of uninterrupted a night. Maintain good sleep hygiene:  Going to bed and waking up at consistent times, avoiding excessive daytime naps, avoiding caffeinated beverages in the evening, avoid excessive stimulation in the evening and generally using bed primarily for sleeping. One hour before bedtime would recommend turning lights down lower, decreasing your activity (may read quietly, listen to music at a low volume). When you get into bed, should eliminate all technology (no texting, emailing, playing with your phone, iPad or tablet in bed). [x] HYDRATION - Maintain good hydration. Drink  2L of fluid a day (4 typical small water bottles)  [x] DIET - Maintain good nutrition. In particular don't skip meals and try and eat healthy balanced meals regularly. [x] TRIGGERS - Look for other triggers and avoid them: Limit caffeine to 1-2 cups a day or less. Avoid dietary triggers that you have noticed bring on your headaches (this could include aged cheese, peanuts, MSG, aspartame and nitrates). [x] EXERCISE - physical exercise as we all know is good for you in many ways, and not only is good for your heart, but also is beneficial for your mental health, cognitive health and  chronic pain/headaches.  I would encourage at the least 5 days of physical exercise weekly for at least 30 minutes. Education and Follow-up  [x] Please call with any questions or concerns. Of course if any new concerning symptoms go to the emergency department. [x] Follow up 3-4 months, sooner if needed       CC: We had the pleasure of evaluating Caio Bean in neurological consultation today. Caio Bean is a   left  handed female who presents today for evaluation of headaches. History obtained from patient as well as available medical record review.   History of Present Illness:   Interval history as of 2023  - no significant new or concerning neurologic symptoms since last visit   - eye doctor - been a while - plans on it  - was working with advocate to try and get insurance and lost contact since due to [facility], depressed lately no SI - was trying to work on getting [employer] after the study ended or somehow get emergency insurance through PCP  - grandpa passed away and dad went to be with him in his final weeks, bad time of year emotionally los sister 3 year ago on Dec 21st, bipolar depression, heart murmur, CHF on autopsy and passed overnight   - BUCKY not on CPAP - followed up with sleep medicine rescheduled to April - not able to afford without insurance  - disability officially submitted after 3 weeks of trying to work on it - was hell     Headaches and migraines   1-2 migraines a month  2-3 milder headaches a week    Preventative:   - Aimovig 1 shot monthly was through the study, then the study ended and last dose was in - 2023    Abortive:   - ibuprofen  - rizatriptan - helps, but expensive      Interval history as of 2023  - no significant new or concerning neurologic symptoms since last visit   -Mood- did have some more headaches in the past month as both friend and grandfather   - eye doctor years ago - wears glasses, looking to get another one     Headaches and migraines   She reports doing well and improving on the shots  Frequency has gone down to at most once a week and severity has gone down to where she finally feels functional  Breakthroughs maybe once a week and she takes ibuprofen for that and it helps bring it down  To being able to function again like a 3/10. Baseline is a 1 out of 10 daily    Preventative:   - Aimovig 1 shot monthly through the study     Abortive:   -Not needing rizatriptan much - expensive and ibuprofen helps   Denies bothersome side effects     Home sleep study 6/4/2023, SOPHIE 8.5, O2 down to 85%, and not yet treating due to cant afford to treat  Starts on back settle in and then to fall asleep goes to one side or other        Interval history as of 4/10/2023  - no significant new or concerning neurologic symptoms since last visit   -She has been doing exposure therapy for PTSD and screen time  - Followed up with [facility] Mcintosh 1/10/2023  - trying to go out more and its exhausting   - sleep medicine 3/16/23 and home study ordered   - mood - a little discouraged when the dizziness worsened but staying positive   - no vision changes lately   - not in with counselor     Headaches and migraines   She reports after the first week of 1/2 no migraines and the baseline was better  Two weeks after first shot dizzy all day   Now that shot is wearing off migraine last weekend and baseline headache and dizziness improving  Classic migraines - last sat first in 2.5 weeks and the rest of the headaches that are milder 4-5/10     Preventative:   - trial of aimovig through the trial - first shot last month 3/14/23 and next shot 4/11/23  -Amitriptyline 50 mg nightly-she called in 10/14/2022 sick to her stomach and not taking amitriptyline for the past couple of days and having trouble sleeping the past 4 nights which would not be surprising if this medication is stopped suddenly. Even on amitriptyline only 2 to 4 hours of sleep.   Amitriptyline was helping decrease the number of migraines, but wanted to try something else and topiramate was sent - Trial of topiramate with gradual titration up to 50 mg twice daily and was helping with the severity of the migraines but the baseline number of headaches and intensity was getting worse/background 1/10 - up to 4/10 and started weaning off two weeks ago or so    Abortive:   - rizatriptan as needed and has not needed in a while, sometime in Feb  Denies bothersome side effects   - through PCP - not tried steroids prescribed by PCP 11/4/22      Interval history as of 1/10/2023  Is attempting to get insurance coverage but does not have any at this time     - has not gone to sleep medicine  - has not followed up with eye due to cost  - vertigo has improved with PT. No longer taking medication for this     Goes to bed at 1030 pm  Wakes up at 830 am  Wakes up multiple times 3-4+ a night  Sleep is not restful     - Continues to be out of work, no job, is going to start looking for one      Mild headaches last 20 minutes to a few days if she doesn't take her medication (ibuprofen), if she takes ibuprofen will last 20 minutes   Migraines last 3-4 hours with the rizatriptan.   Only had to repeat 1 time in the past 2 months    Interval history as of 8/19/2022  - denies any new or concerning neurologic symptoms since last visit   - Saw [facility]ar 6/28/22   - Vertigo has improved and still goes to PT once a week, no longer takes meclizine as it was not helping  - Was referred to sleep medicine but she has no insurance currently and was unable to make an appointment with them due to cost  - Sleep is worse than previous, now sleeping 2 hours and then waking up, used to have more problems falling asleep but now the problem is staying asleep  - Continues to be out of work, no job   - Has not been able to follow up with eye doctor due to cost  - No ED visits since May  - Is working on exposure therapy every day but doesn't feel like she is making improvements currently and has been stuck trying to improve her time looking at screens, can only tolerate an hour of TV or 20 mins of computer    Headaches and migraines   - baseline headache is now a 3/10 from a 1/10 last time  - has not had a headache that is >5/10 in more than 2 months  Preventative: amitriptyline now up to 50 mg HS - just started it late June   Abortive: Maxalt (has not had to use it at all)  Denies bothersome side effects        Interval history as of 5/6/2022  - PCP is managing return to work, she is going back to work on Monday   -she had VNG which showed some sort of peripheral abnormality on the left contributing to her vertigo and is back with vestibular therapy  - she continues to struggle with deconditioning to lights and activities  -she has not yet followed up with counselor as recommended although  did offer list of counselors covered by her insurance, but has someone at PCP office that she is looking into   - She has not yet followed up with Sleep Medicine is recommended  - has not yet seen eye doctor for dilated eye exam, planning for next month     Headaches and migraines   She had she continues to have chronic near daily headaches that are typically mild at about 3/10, but only one significant migraine 6/10 yesterday where she took aleve and today was worse and went with ED. She went to the emergency department this morning due to migraine with diplopia, did not try rizatriptan and had improvement with sumatriptan a migraine cocktail with significant improvement in symptoms. She in fact saw Neurology while in the emergency department and CTA head and neck and CT head were performed. Has mostly not been taking any medications for headaches. Headache was worse yesterday and she had a naproxen in early afternoon. She has not been taking maxalt at home. Has been working on exposure therapy at home with light and screens and is only up to moderate tolerance.      Preventative: nothing   Abortive: had not tried Rizatriptan yet      History as of our initial visit 03/29/2022: History of concussion  - 9th grade. States that she hit her head during indoor soccer and perhaps had a few seconds of LOC vs anmesia. - Most of 20s did not have medical insurance and had some hits to the head   - 2/29/2020 hit her head on her car. Didn't have LOC. Headaches, motion and light sensitivity started 20 minutes after. Next time lost balance   - 3/4/2020 when she was stepping out of her car, loss balance, did not hit head. Went to ER and then did therapy afterwards. Quit her job after this. - Patient started a job in August 2021 after being out of work for 1.5 years. She feels like this was over stimulating for her. She felt noise and light were overwhelming for her.     - November of 2021 patient presented to her family doctor with a severe headache that was unrelenting for 1 and half weeks. Patient had stabbing on the left side of her head. dizziness and felt unsteady. She was sent to the emergency room for evaluation via ambulance. CTA of the head and neck was done in the emergency room which was negative   - Since the ER, states that she developed dizziness 1 week before [place] out of nowhere. working from home since 12/27/2021 due to her dizziness. - Does wear her sunglasses frequently during the day indoors. 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   States at home uses less, however she did place them on 2 times during our visit  - She has followed with Sports Medicine, physical therapy, primary care, saw my neurology colleague [facility] Schaeffer 02/02/2022  - went back to work after visit [facility] Schaeffer, was working from home until episode 3/17/22    On 03/17/2022 she was seated on a couch when the back of the couch collapsed causing her to fall and hit the right side of her head on an end table she thinks, does not remember hitting her head, but had a red conchis at the right cheek area  Acute symptoms included:  Does not remember a few seconds of time, unknown if LOC as unwitnessed, progressively worsening vertigo/room spinning and generalized headache with photophobia and phonophobia and nausea    - as of 3/29/2022: she continues to have chronic photophobia, migraines, but vertigo after the incident actually has now been better than prior     Mood:   - anxiety and depression in the past, worse later due to medical conditions   - post traumatic stress symptoms - hypervigilance, impact on cognition and mood, lack of emotional filter  Things at work are bad, HR said if no emotional filter you should not come back   Never worked with a counselor   No SI    Work  8-4  M-F  Out the past 2 weeks      Headaches started at what age? 20 years old  How often do the headaches occur?   - as of 3/29/2022: chronic daily background headaches for years since at least 3/2020, worse when vertigo kicked in Dec 2021 and 1 day a week above 3/10    Aura? without aura      Last eye exam: eye sight improving, followed a long time ago, 7 years ago     Where is your headache located and pain quality?   - bifrontal pressure like an iron band across forehead is most common and radiates back across the top of the scalp until bioccipital   - ice pick left parietal   What is the intensity of pain? Average: 1/10, worst 7/10  Associated symptoms:   [x] Nausea       [] Vomiting      [x] Photophobia     [x]Phonophobia      [] Osmophobia  [] Blurred vision   [x] Light-headed or dizzy - chronic     [] Tinnitus - not for prolonged periods and not severe  [] Hands or feet tingle or feel numb/paresthesias    [] Ptosis      [] Facial droop  [] Lacrimation  [] Nasal congestion/rhinorrhea        Things that make the headache worse? No specific movements, sometimes bending over    Headache triggers:  Lights, can be worse in am, stress, weather changes, unsure if hormonal     Have you seen someone else for headaches or pain?  Yes many providers   Are you current pregnant or planning on getting pregnant? No plans, not active   Have you ever had any Brain imaging? yes    What medications do you take or have you taken for your headaches?    ABORTIVE:      Ibuprofen dulls it  Meclizine    Past  Something as needed in the past   Steroids in the past   Migraine cocktails in ED have helped bring pain down     PREVENTIVE:   -    MVI     Past/ failed/contraindicated:  -      LIFESTYLE  Sleep   - averages: 6-8 hours, snores, never had a sleep study  Problems falling asleep?:   Yes  Problems staying asleep?:  Yes, 1-4 for unknown reasons      Water: 6 cups per day  Caffeine: usually 0-1 soda per day      The following portions of the patient's history were reviewed and updated as appropriate: allergies, current medications, past family history, past medical history, past social history, past surgical history and problem list.    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  Lives with cat    Past Medical History:     Past Medical History:   Diagnosis Date    Concussion     Edema of left lower extremity 07/12/2021    Migraine     Sleep apnea     TBI (traumatic brain injury) ([address] [facility])     [id number] stated by patient       Patient Active Problem List   Diagnosis    Obesity    Headache    Vertigo    Worsening headaches    B12 deficiency    Vitamin D deficiency    Snoring    Post-traumatic stress    Chronic post-traumatic headache    Migraine    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    Neurologic complaint, functional    Traumatic injury of ankle    Insomnia    Circadian rhythm sleep disorder    Obstructive sleep apnea    LLQ pain    Nausea and vomiting    RLQ abdominal pain    Does not have health insurance       Medications:      Current Outpatient Medications   Medication Sig Dispense Refill    ibuprofen (MOTRIN) 200 mg tablet Take 400 mg by mouth every 6 (six) hours as needed for mild pain      Multiple Vitamins-Minerals (ONE-A-DAY WOMENS PO) Take 1 tablet by mouth in the morning      rizatriptan (MAXALT) 10 mg tablet Take 1 tablet (10 mg total) by mouth once as needed for migraine May repeat in 2 hours if needed. Max 2/24 hours, 9/month. 9 tablet 11    dicyclomine (BENTYL) 20 mg tablet Take 1 tablet (20 mg total) by mouth 3 (three) times a day 20 tablet 0     No current facility-administered medications for this visit.         Allergies:    No Known Allergies    Family History:     Family History   Problem Relation Age of Onset    Cancer Mother     Heart disease Father        Social History:     Social History     Socioeconomic History    Marital status: Single     Spouse name: Not on file    Number of children: Not on file    Years of education: Not on file    Highest education level: Not on file   Occupational History    Not on file   Tobacco Use    Smoking status: Never    Smokeless tobacco: Never   Vaping Use    Vaping status: Never Used   Substance and Sexual Activity    Alcohol use: Not Currently    Drug use: Never    Sexual activity: Not on file   Other Topics Concern    Not on file   Social History Narrative    CONSUMES 1 SODA PER DAY     Social Determinants of Health     Financial Resource Strain: Not on file   Food Insecurity: Not on file   Transportation Needs: Not on file   Physical Activity: Not on file   Stress: Not on file   Social Connections: Not on file   Intimate Partner Violence: Not on file   Housing Stability: Not on file         Objective:       Physical Exam:                                                                 Vitals:            Constitutional:    /88 (BP Location: Left arm, Patient Position: Sitting, Cuff Size: Standard)   Pulse 88   Temp 98.4 °F (36.9 °C) (Temporal)   Ht 5' 3" (1.6 m)   Wt 122 kg (270 lb) BMI 47.83 kg/m²   BP Readings from Last 3 Encounters:   12/14/23 169/88   09/27/23 118/72   09/22/23 126/67     Pulse Readings from Last 3 Encounters:   12/14/23 88   09/27/23 80   09/22/23 66         Well developed, well nourished, well groomed. Psychiatric:  Normal behavior and appropriate affect, wearing sunglasses indoors, cheerful despite recent low mood       Neurological Examination:     Mental status/cognitive function:   Recent and remote memory appear intact to history today. Attention span and concentration as well as fund of knowledge are appropriate for age. Normal language and spontaneous speech. Cranial Nerves:   VII-facial expression symmetric  Motor Exam: symmetric bulk throughout. no atrophy, fasciculations or abnormal movements noted. Coordination:  no apparent dysmetria, ataxia or tremor noted  Gait: steady casual gait with cane if needed for stability         Pertinent lab results:   - routine labs ordered 02/08/2022 and not yet obtained  11/02/2021 CMP and CBC unremarkable  TSH normal     EKG 05/06/2022 sinus rhythm with sinus arrhythmia, normal EKG, rate 74,   EKG 5/15/2014,  normal sinus rhythm, rate 80,      Imaging: I have personally reviewed imaging and radiology read   - noncontrast head CT 03/04/2020: No acute intracranial abnormality.  - CTA head and neck with without contrast 11/02/2021: Normal CT angiogram of the head and neck. - MRI brain IAC with without contrast 2/9/22: No IAC or CP angle pathology. Unremarkable MRI of the brain. *We discussed as of my retrospective review 7/24/2023 and 12/14/2023 she has a partially empty sella, prominent optic nerve sheath bilaterally with tortuosity bilaterally, tight ventricles, cerebellar tonsils overlie the foramen magnum with tight junction  - noncontrast head CT 03/17/2022: No acute intracranial abnormality.     Review of Systems:   ROS obtained by medical assistant and personally reviewed, but if any symptoms listed below say negative, does not mean patient has not had this symptom since last visit, please see HPI for details of symptoms discussed this visit. I recommended PCP follow up for non neurologic problems. Review of Systems   Constitutional:  Negative for appetite change and fever. HENT: Negative. Negative for hearing loss, tinnitus, trouble swallowing and voice change. Eyes: Negative. Negative for photophobia and pain. Respiratory: Negative. Negative for shortness of breath. Cardiovascular: Negative. Negative for palpitations. Gastrointestinal: Negative. Negative for nausea and vomiting. Endocrine: Negative. Negative for cold intolerance. Genitourinary: Negative. Negative for dysuria, frequency and urgency. Musculoskeletal: Negative. Negative for myalgias and neck pain. Skin: Negative. Negative for rash. Neurological:  Positive for headaches. Negative for dizziness, tremors, seizures, syncope, facial asymmetry, speech difficulty, weakness, light-headedness and numbness. Hematological: Negative. Does not bruise/bleed easily. Psychiatric/Behavioral:  Positive for sleep disturbance. Negative for confusion and hallucinations. All other systems reviewed and are negative. I have spent 38 minutes with Patient  today in which greater than 50% of this time was spent in counseling/coordination of care regarding Diagnostic results, Prognosis, Risks and benefits of tx options, Instructions for management, Patient and family education, Importance of tx compliance, Risk factor reductions, Impressions, Counseling / Coordination of care, Documenting in the medical record, Reviewing / ordering tests, medicine, procedures  , and Obtaining or reviewing history  . I also spent 5 minutes non face to face for this patient the same day.        Author:  Desiree Roberto MD 12/14/2023 2:42 PM

## 2023-12-14 NOTE — PATIENT INSTRUCTIONS
Follow up with eye doctor for dilated eye exam and please let me know if they see any signs of papilledema or swelling behind your eyes and certainly go to the ER if you have any acute vision changes for lumbar puncture which could decrease pressure right away. We discussed I am not officially diagnosing you with idiopathic intracranial hypertension however you do have some subtle findings on imaging that could suggest this may be slightly elevated due to the untreated sleep apnea. If you ever wanted we could add acetazolamide/Diamox at any time to treat, get a lumbar puncture outpatient any time to treat or further work up, and do not trust that the eye doctor will send me the report. - "Rewire Your Anxious Brain: How to Use the Neuroscience of Fear to End Anxiety, Panic, and Worry" By Butch Chinchilla PhD    Headache/migraine treatment:   Abortive medications (for immediate treatment of a headache): It is ok to take ibuprofen, acetaminophen or naproxen (Advil, Tylenol,  Aleve, Excedrin) if they help your headaches you should limit these to No more than 3 times a week to avoid medication overuse/rebound headaches. For your more moderate to severe migraines take this medication early   Maxalt (rizatriptan) 10mg tabs - take one at the onset of headache. May repeat one time after 1-2 hours if pain has not resolved. (Max 2 a day and 9 a month)       Prescription preventive medications for headaches/migraines   (to take every day to help prevent headaches - not to take at the time of headache):  [x]Aimovig 1 shot monthly if we can get it back         *Typically these types of medications take time untill you see the benefit, although some may see improvement in days, often it may take weeks, especially if the medication is being titrated up to a beneficial level. Please contact us if there are any concerns or questions regarding the medication.      Lifestyle Recommendations:  [x] SLEEP - Maintain a regular sleep schedule: Adults need at least 7-8 hours of uninterrupted a night. Maintain good sleep hygiene:  Going to bed and waking up at consistent times, avoiding excessive daytime naps, avoiding caffeinated beverages in the evening, avoid excessive stimulation in the evening and generally using bed primarily for sleeping. One hour before bedtime would recommend turning lights down lower, decreasing your activity (may read quietly, listen to music at a low volume). When you get into bed, should eliminate all technology (no texting, emailing, playing with your phone, iPad or tablet in bed). [x] HYDRATION - Maintain good hydration. Drink  2L of fluid a day (4 typical small water bottles)  [x] DIET - Maintain good nutrition. In particular don't skip meals and try and eat healthy balanced meals regularly. [x] TRIGGERS - Look for other triggers and avoid them: Limit caffeine to 1-2 cups a day or less. Avoid dietary triggers that you have noticed bring on your headaches (this could include aged cheese, peanuts, MSG, aspartame and nitrates). [x] EXERCISE - physical exercise as we all know is good for you in many ways, and not only is good for your heart, but also is beneficial for your mental health, cognitive health and  chronic pain/headaches. I would encourage at the least 5 days of physical exercise weekly for at least 30 minutes. Education and Follow-up  [x] Please call with any questions or concerns. Of course if any new concerning symptoms go to the emergency department.   [x] Follow up 3-4 months, sooner if needed

## 2024-03-27 ENCOUNTER — TELEPHONE (OUTPATIENT)
Dept: NEUROLOGY | Facility: CLINIC | Age: 39
End: 2024-03-27

## 2024-03-27 NOTE — TELEPHONE ENCOUNTER
Pt called in and stated she can't get out of bed today and needs to reschedule her appt with Mary Malhotra.    Pt rescheduled for 5/8/24 @ 8:15

## 2024-04-16 ENCOUNTER — TELEPHONE (OUTPATIENT)
Dept: SLEEP CENTER | Facility: CLINIC | Age: 39
End: 2024-04-16

## 2024-04-16 NOTE — TELEPHONE ENCOUNTER
Patient left message on the nurse line inquiring if she should cancel her appointment with Dr. Carrasco 4/23/2024 since she has no insurance, nor initiated treatment with CPAP at this time.    Returned call from patient, patient cancelled appointment with Dr. Carrasco 4/23/2024.  [facility] when she has insurance to re-schedule.

## 2024-04-24 ENCOUNTER — TELEPHONE (OUTPATIENT)
Dept: NEUROLOGY | Facility: CLINIC | Age: 39
End: 2024-04-24

## 2024-04-24 NOTE — TELEPHONE ENCOUNTER
Called and spoke to patient to confirm their upcoming appointment with Dr. Vines. Informed patient about arriving in the [place] location 15 minutes prior to their appointment to get checked in and going over chart.

## 2024-04-25 ENCOUNTER — TELEPHONE (OUTPATIENT)
Age: 39
End: 2024-04-25

## 2024-04-25 NOTE — TELEPHONE ENCOUNTER
Pt called requesting a letter of medical exception be sent to [facility] to excuse from jury duty.  They advised her to ask doctor to state that her condition is permanent.    Juror ID# [id number]  Fax:  [phone]    She asked for a return call once completed and sent.    Thank you!

## 2024-06-07 ENCOUNTER — OFFICE VISIT (OUTPATIENT)
Dept: FAMILY MEDICINE CLINIC | Facility: CLINIC | Age: 39
End: 2024-06-07

## 2024-06-07 VITALS
OXYGEN SATURATION: 97 % | DIASTOLIC BLOOD PRESSURE: 80 MMHG | SYSTOLIC BLOOD PRESSURE: 140 MMHG | HEART RATE: 78 BPM | HEIGHT: 63 IN | WEIGHT: 273 LBS | BODY MASS INDEX: 48.37 KG/M2

## 2024-06-07 DIAGNOSIS — Z13.220 LIPID SCREENING: ICD-10-CM

## 2024-06-07 DIAGNOSIS — R29.818 DISABILITY DUE TO NEUROLOGICAL DISORDER: ICD-10-CM

## 2024-06-07 DIAGNOSIS — F43.10 POST-TRAUMATIC STRESS: ICD-10-CM

## 2024-06-07 DIAGNOSIS — G43.009 MIGRAINE WITHOUT AURA AND WITHOUT STATUS MIGRAINOSUS, NOT INTRACTABLE: Primary | ICD-10-CM

## 2024-06-07 DIAGNOSIS — G47.33 OBSTRUCTIVE SLEEP APNEA: ICD-10-CM
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
## 2024-06-07 PROBLEM — R29.6 FALLS: Status: ACTIVE | Noted: 2024-06-07

## 2024-06-07 NOTE — LETTER
Date: 6/7/2024    To whom it may concern:     This is to certify that Ania Alejandre, Juror ID# [id number], has been under my care for the following diagnosis: Migraine headaches, posttraumatic stress disorder, neurologic disorder..    It is unlikely that these conditions will change in the future.    I feel that Ania Alejandre is unable to serve on Jury Duty at this time for the above mentioned medical reasons.                  Sincerely,  Braxton Johnson MD

## 2024-06-07 NOTE — ASSESSMENT & PLAN NOTE
Patient continues to have some issues on occasion with falls.  She had 1 recently on Tuesday.  Slipped after spilling some liquids.    Has been an issue before.  Will continue to follow.  Has had vertigo symptoms previously as well, which do not make it any better.  Continue neurology follow-up.

## 2024-06-07 NOTE — PATIENT INSTRUCTIONS
1. Migraine without aura and without status migrainosus, not intractable  Assessment & Plan:  Continues with significant issues with regard to headaches.  Follow with neurology.  Currently using Maxalt.  No other changes from this office.  Continue neurology follow-up.    2. Obstructive sleep apnea  3. Post-traumatic stress  Assessment & Plan:  Has noted some significant stresses at times.  Some aspect of PTSD.  Will continue to follow.  Seen neurology currently.  Not on antidepressants at the moment.  4. Fall, initial encounter  Assessment & Plan:  Patient continues to have some issues on occasion with falls.  She had 1 recently on Tuesday.  Slipped after spilling some liquids.    Has been an issue before.  Will continue to follow.  Has had vertigo symptoms previously as well, which do not make it any better.  Continue neurology follow-up.  5. 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        COVID 19 Instructions    Ania Alejandre was advised to limit contact with others to essential tasks such as getting food, medications, and medical care.    Proper handwashing reviewed, and Hand sanitzer when washing is not available.    If the patient develops symptoms of COVID 19, the patient should call the office as soon as possible.    It is strongly recommended that Flu Vaccinations be obtained.      Virtual Visits:  Arsenio: This works on smart phones (any phone with Internet browsing capability).  You should get a text message when the provider is ready to see you.  Click on the link in the text message, and the call should start.  You will need to type in your name, and allow camera and microphone access.  This is HIPPA compliant, and secure.      If you have not already done so, get immunized to COVID 19.      We are committed to getting you vaccinated as soon as possible and will be closely  following CDC and [facility] guidelines as they are released and revised.  Please refer to our COVID-19 vaccine webpage for the most up to date information on the vaccine and our distribution efforts.    This site will also have the most up to date recommendations for COVID booster vaccine.    https://www.[facility].org/covid-19/protect-yourself/covid-19-vaccine    Call 1-108-HGXJCVU ([phone]), option 7    You can also visit https://www.vaccines.gov/ to find vaccines in your area.    OUR LOCATION:    [facility]  [address] [place] [address], [address]  [place], [place], [address] [phone]  Fax: [phone]    Lab services, Rheumatology, and OB/GYN are at this location as well.

## 2024-06-07 NOTE — ASSESSMENT & PLAN NOTE
Has noted some significant stresses at times.  Some aspect of PTSD.  Will continue to follow.  Seen neurology currently.  Not on antidepressants at the moment.

## 2024-06-07 NOTE — ASSESSMENT & PLAN NOTE
Continues with significant issues with regard to headaches.  Follow with neurology.  Currently using Maxalt.  No other changes from this office.  Continue neurology follow-up.

## 2024-06-07 NOTE — ASSESSMENT & PLAN NOTE
Significant issues with function due to her neurologic issues with headaches, migraines, dizziness, weakness.

## 2024-06-07 NOTE — PROGRESS NOTES
Ambulatory Visit  Name: Ania Alejandre      : 1985      MRN: [id number]  Encounter Provider: Braxton Johnson MD  Encounter Date: 2024   Encounter department: [facility]    Assessment & Plan   1. Migraine without aura and without status migrainosus, not intractable  Assessment & Plan:  Continues with significant issues with regard to headaches.  Follow with neurology.  Currently using Maxalt.  No other changes from this office.  Continue neurology follow-up.    2. Obstructive sleep apnea  3. Post-traumatic stress  Assessment & Plan:  Has noted some significant stresses at times.  Some aspect of PTSD.  Will continue to follow.  Seen neurology currently.  Not on antidepressants at the moment.  4. Fall, initial encounter  Assessment & Plan:  Patient continues to have some issues on occasion with falls.  She had 1 recently on Tuesday.  Slipped after spilling some liquids.    Has been an issue before.  Will continue to follow.  Has had vertigo symptoms previously as well, which do not make it any better.  Continue neurology follow-up.  5. 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    6. Lipid screening  Comments:  At some point would recommend laboratory studies for screening.      Depression Screening and Follow-up Plan: Patient's depression screening was positive with a PHQ-2 score of 4. Their PHQ-9 score was 12. Patient assessed for underlying major depression. Brief counseling provided and recommend additional follow-up/re-evaluation next office visit.       History of Present Illness     Here to follow up on multiple issues.    HA: Continues to have problems.  She is looking into disability.  Continues with Maxalt as needed.    On good days, she can last 3-4 hours of activity.  Otherwise is much less, and sometimes only can manage to feed the  "cat.    Patient did go to neurology last in December.  At that point, they did not make any adjustments.  Reviewed the note.    Sleep apnea: Still has some issues.  Sleeping on her side now, which helps somewhat.  She is still looking to use CPAP, will need to look at insurance issues with that, however.            Review of Systems   Constitutional: Negative.    HENT: Negative.     Eyes: Negative.    Respiratory: Negative.     Cardiovascular: Negative.    Gastrointestinal: Negative.    Endocrine: Negative.    Genitourinary: Negative.    Musculoskeletal: Negative.    Skin: Negative.    Allergic/Immunologic: Negative.    Neurological: Negative.    Hematological: Negative.    Psychiatric/Behavioral: Negative.         Objective     /80 (BP Location: Left arm, Patient Position: Sitting, Cuff Size: Extra-Large)   Pulse 78   Ht 5' 3\" (1.6 m)   Wt 124 kg (273 lb)   SpO2 97%   BMI 48.36 kg/m²     Physical Exam  Vitals and nursing note reviewed.   Constitutional:       Appearance: Normal appearance. She is well-developed.      Comments: Patient appears to be uncomfortable, multiple changes in position.  Wearing dark glasses secondary to her discomfort with her eyes.     HENT:      Head: Normocephalic and atraumatic.   Cardiovascular:      Rate and Rhythm: Normal rate and regular rhythm.      Pulses:           Carotid pulses are 2+ on the right side and 2+ on the left side.     Heart sounds: Normal heart sounds.   Pulmonary:      Effort: Pulmonary effort is normal.      Breath sounds: Normal breath sounds. No wheezing or rales.   Chest:      Chest wall: No tenderness.   Neurological:      Mental Status: She is alert.       Administrative Statements     "

## 2024-09-08 NOTE — PROGRESS NOTES
[facility] completed a chart review prior to contacting patient. Called patient to follow up on her decision to complete a medical assistance application. Unable to reach patient. Unable to reach letter sent. If no return call is received [facility] will close referral due to lack of patient engagement.     Anticipated next contact date: 11/27/23
n/a

## 2024-12-12 ENCOUNTER — OFFICE VISIT (OUTPATIENT)
Dept: FAMILY MEDICINE CLINIC | Facility: CLINIC | Age: 39
End: 2024-12-12

## 2024-12-12 VITALS
OXYGEN SATURATION: 99 % | RESPIRATION RATE: 18 BRPM | HEIGHT: 63 IN | WEIGHT: 274 LBS | HEART RATE: 111 BPM | SYSTOLIC BLOOD PRESSURE: 128 MMHG | BODY MASS INDEX: 48.55 KG/M2 | TEMPERATURE: 97.4 F | DIASTOLIC BLOOD PRESSURE: 70 MMHG

## 2024-12-12 DIAGNOSIS — J06.9 UPPER RESPIRATORY TRACT INFECTION, UNSPECIFIED TYPE: Primary | ICD-10-CM

## 2024-12-12 DIAGNOSIS — G43.009 MIGRAINE WITHOUT AURA AND WITHOUT STATUS MIGRAINOSUS, NOT INTRACTABLE: ICD-10-CM

## 2024-12-12 DIAGNOSIS — Z13.220 LIPID SCREENING: ICD-10-CM

## 2024-12-12 DIAGNOSIS — F43.10 POST-TRAUMATIC STRESS: ICD-10-CM

## 2024-12-12 DIAGNOSIS — G47.33 OBSTRUCTIVE SLEEP APNEA: ICD-10-CM

## 2024-12-12 LAB
SARS-COV-2 AG UPPER RESP QL IA: NEGATIVE
VALID CONTROL: NORMAL

## 2024-12-12 PROCEDURE — 99214 OFFICE O/P EST MOD 30 MIN: CPT | Performed by: FAMILY MEDICINE

## 2024-12-12 PROCEDURE — 87811 SARS-COV-2 COVID19 W/OPTIC: CPT | Performed by: FAMILY MEDICINE

## 2024-12-12 RX ORDER — AZITHROMYCIN 250 MG/1
TABLET, FILM COATED ORAL
Qty: 6 TABLET | Refills: 0 | Status: SHIPPED | OUTPATIENT
Start: 2024-12-12 | End: 2024-12-17

## 2024-12-12 NOTE — PATIENT INSTRUCTIONS
1. Upper respiratory tract infection, unspecified type  -     POCT Rapid Covid Ag  -     azithromycin (ZITHROMAX) 250 mg tablet; Take 2 tablets on day 1, then 1 tablet daily days 2 through 5  2. Migraine without aura and without status migrainosus, not intractable  Assessment & Plan:  Continues to have some days with headache and other days without.  Has been able to increase her duration on computer, but still finds she needs to be off for a bit with this.  She works for about a day to day and a half, she will then need at least 24 hours to recover prior to being able to go back to doing anything.  Again, this results in significant headaches for her.  Follow-up with neurology in the future.         3. Obstructive sleep apnea  Assessment & Plan:  Stable.  Still has issues.  Has not been able to get CPAP secondary to insurance questions.  Once her regular insurance kicks in, she will then go for CPAP machine.       4. Post-traumatic stress  Assessment & Plan:  Would encourage counseling.  Again, she is trying to do that, but it is difficult with insurance issues.  Does have an advocate.         5. Lipid screening        1. Upper respiratory tract infection, unspecified type  -     POCT Rapid Covid Ag  -     azithromycin (ZITHROMAX) 250 mg tablet; Take 2 tablets on day 1, then 1 tablet daily days 2 through 5  2. Migraine without aura and without status migrainosus, not intractable  Assessment & Plan:  Continues to have some days with headache and other days without.  Has been able to increase her duration on computer, but still finds she needs to be off for a bit with this.  She works for about a day to day and a half, she will then need at least 24 hours to recover prior to being able to go back to doing anything.  Again, this results in significant headaches for her.  Follow-up with neurology in the future.         3. Obstructive sleep apnea  Assessment & Plan:  Stable.  Still has issues.  Has not been able to get  CPAP secondary to insurance questions.  Once her regular insurance kicks in, she will then go for CPAP machine.       4. Post-traumatic stress  Assessment & Plan:  Would encourage counseling.  Again, she is trying to do that, but it is difficult with insurance issues.  Does have an advocate.         5. Lipid screening      Chief Complaint   Patient presents with    Follow-up     Patient in office for 6 months follow up. Patient will like to discuss ongoing cold symptoms of sore throat, headache, congestion and cough.

## 2024-12-12 NOTE — ASSESSMENT & PLAN NOTE
Continues to have some days with headache and other days without.  Has been able to increase her duration on computer, but still finds she needs to be off for a bit with this.  She works for about a day to day and a half, she will then need at least 24 hours to recover prior to being able to go back to doing anything.  Again, this results in significant headaches for her.  Follow-up with neurology in the future.

## 2024-12-12 NOTE — PROGRESS NOTES
Name: Ania Alejandre      : 1985      MRN: [id number]  Encounter Provider: Braxton Johnson MD  Encounter Date: 2024   Encounter department: [facility]  :  Assessment & Plan  Upper respiratory tract infection, unspecified type  Likely VURI, but for 2 weeks now. Would treat for Bronchitis with Z-emily. Follow in future.    Orders:  •  POCT Rapid Covid Ag  •  azithromycin (ZITHROMAX) 250 mg tablet; Take 2 tablets on day 1, then 1 tablet daily days 2 through 5    Migraine without aura and without status migrainosus, not intractable  Continues to have some days with headache and other days without.  Has been able to increase her duration on computer, but still finds she needs to be off for a bit with this.  She works for about a day to day and a half, she will then need at least 24 hours to recover prior to being able to go back to doing anything.  Again, this results in significant headaches for her.  Follow-up with neurology in the future.         Obstructive sleep apnea  Stable.  Still has issues.  Has not been able to get CPAP secondary to insurance questions.  Once her regular insurance kicks in, she will then go for CPAP machine.       Post-traumatic stress  Would encourage counseling.  Again, she is trying to do that, but it is difficult with insurance issues.  Does have an advocate.         Lipid screening  No blood work lately.  Given insurance issues would not recommend that be done right now.             1. Upper respiratory tract infection, unspecified type  -     POCT Rapid Covid Ag  -     azithromycin (ZITHROMAX) 250 mg tablet; Take 2 tablets on day 1, then 1 tablet daily days 2 through 5  2. Migraine without aura and without status migrainosus, not intractable  Assessment & Plan:  Continues to have some days with headache and other days without.  Has been able to increase her duration on computer, but still finds she needs to be off for a bit with this.  She works  for about a day to day and a half, she will then need at least 24 hours to recover prior to being able to go back to doing anything.  Again, this results in significant headaches for her.  Follow-up with neurology in the future.         3. Obstructive sleep apnea  Assessment & Plan:  Stable.  Still has issues.  Has not been able to get CPAP secondary to insurance questions.  Once her regular insurance kicks in, she will then go for CPAP machine.       4. Post-traumatic stress  Assessment & Plan:  Would encourage counseling.  Again, she is trying to do that, but it is difficult with insurance issues.  Does have an advocate.         5. Lipid screening      Chief Complaint   Patient presents with   • Follow-up     Patient in office for 6 months follow up. Patient will like to discuss ongoing cold symptoms of sore throat, headache, congestion and cough.            History of Present Illness     Patient has been having cold symptoms for about the last 2 weeks or so.  She wondered about COVID with this.  Some sore throat, headache, congestion, cough.    Migraines: She does still have symptoms.  Somewhat better than before, but still having problems.  She is not quite as affected by the light as she was before.  Is able to work for about 3-4 hours, then needs to rest for the next several hours.  Can work for 1 to 1.5 days of work, but then needs to recover for 1-2 days.      BUCKY: Trying to get insurance to get CPAP.  Wakes every couple hours.    PTSD: Still issues. Making some progress. Was not in counseling now with insurance issues.          Review of Systems   Constitutional:  Negative for appetite change, chills, diaphoresis, fatigue and fever.   HENT:  Positive for congestion and sore throat. Negative for ear pain, facial swelling, postnasal drip, rhinorrhea, sinus pressure, sinus pain, trouble swallowing and voice change.    Respiratory:  Positive for cough. Negative for chest tightness, shortness of breath and  "wheezing.    Cardiovascular:  Negative for chest pain.   Gastrointestinal:  Negative for abdominal pain, diarrhea, nausea and vomiting.   Musculoskeletal:  Negative for myalgias.   Neurological:  Positive for headaches.       Objective   /70 (BP Location: Right arm, Patient Position: Sitting, Cuff Size: Adult)   Pulse (!) 111   Temp (!) 97.4 °F (36.3 °C) (Temporal)   Resp 18   Ht 5' 3\" (1.6 m)   Wt 124 kg (274 lb)   SpO2 99%   BMI 48.54 kg/m²      Physical Exam  Vitals and nursing note reviewed.   Constitutional:       Appearance: Normal appearance. She is well-developed.   HENT:      Head: Normocephalic and atraumatic.      Comments: Normal palpation/percussion of face  Cardiovascular:      Rate and Rhythm: Normal rate and regular rhythm.      Pulses:           Carotid pulses are 2+ on the right side and 2+ on the left side.     Heart sounds: Normal heart sounds.   Pulmonary:      Effort: Pulmonary effort is normal.      Breath sounds: Normal breath sounds. No wheezing or rales.   Chest:      Chest wall: No tenderness.   Neurological:      Mental Status: She is alert.         "

## 2024-12-12 NOTE — ASSESSMENT & PLAN NOTE
Would encourage counseling.  Again, she is trying to do that, but it is difficult with insurance issues.  Does have an advocate.

## 2024-12-12 NOTE — ASSESSMENT & PLAN NOTE
Stable.  Still has issues.  Has not been able to get CPAP secondary to insurance questions.  Once her regular insurance kicks in, she will then go for CPAP machine.

## 2025-03-24 ENCOUNTER — TELEPHONE (OUTPATIENT)
Dept: NEUROLOGY | Facility: CLINIC | Age: 40
End: 2025-03-24

## 2025-03-24 ENCOUNTER — TELEPHONE (OUTPATIENT)
Age: 40
End: 2025-03-24

## 2025-03-24 ENCOUNTER — TELEMEDICINE (OUTPATIENT)
Dept: NEUROLOGY | Facility: CLINIC | Age: 40
End: 2025-03-24

## 2025-03-24 DIAGNOSIS — G43.009 MIGRAINE WITHOUT AURA AND WITHOUT STATUS MIGRAINOSUS, NOT INTRACTABLE: Primary | ICD-10-CM

## 2025-03-24 DIAGNOSIS — G47.33 OSA (OBSTRUCTIVE SLEEP APNEA): ICD-10-CM

## 2025-03-24 PROCEDURE — 99214 OFFICE O/P EST MOD 30 MIN: CPT | Performed by: PSYCHIATRY & NEUROLOGY

## 2025-03-24 RX ORDER — FREMANEZUMAB-VFRM 225 MG/1.5ML
INJECTION SUBCUTANEOUS
Qty: 1 ML | Refills: 11 | Status: SHIPPED | OUTPATIENT
Start: 2025-03-24

## 2025-03-24 RX ORDER — RIZATRIPTAN BENZOATE 10 MG/1
10 TABLET ORAL ONCE AS NEEDED
Qty: 9 TABLET | Refills: 11 | Status: SHIPPED | OUTPATIENT
Start: 2025-03-24

## 2025-03-24 NOTE — TELEPHONE ENCOUNTER
Patient called in requesting to be seen virtually. Her last OV was 12/14/2023. She states that she does not feel comfortable coming into the office due to having to use stairs. Is patient able to be changed to virtual? Please call the patient with decision.    Thank you,  Rosetta

## 2025-03-24 NOTE — PATIENT INSTRUCTIONS
"I still recommend following up with sleep medicine for treatment of the sleep apnea    Follow up with eye doctor for dilated eye exam and please let me know if they see any signs of papilledema or swelling behind your eyes and certainly go to the ER if you have any acute vision changes for lumbar puncture which could decrease pressure right away.  We discussed I am not officially diagnosing you with idiopathic intracranial hypertension however you do have some subtle findings on imaging that could suggest this may be slightly elevated due to the untreated sleep apnea.  If you ever wanted we could add acetazolamide/Diamox at any time to treat, get a lumbar puncture outpatient any time to treat or further work up, and do not trust that the eye doctor will send me the report.    - \"Rewire Your Anxious Brain: How to Use the Neuroscience of Fear to End Anxiety, Panic, and Worry\" By Viraj Caba    Headache/migraine treatment:   Abortive medications (for immediate treatment of a headache):   It is ok to take ibuprofen, acetaminophen or naproxen (Advil, Tylenol,  Aleve, Excedrin) if they help your headaches you should limit these to No more than 3 times a week to avoid medication overuse/rebound headaches.     For your more moderate to severe migraines take this medication early   Maxalt (rizatriptan) 10mg tabs - take one at the onset of headache. May repeat one time after 1-2 hours if pain has not resolved.   (Max 2 a day and 9 a month)       Prescription preventive medications for headaches/migraines   (to take every day to help prevent headaches - not to take at the time of headache):  [x]    -     fremanezumab-vfrm (Ajovy) 225 MG/1.5ML auto-injector; Inject once a month subcutaneously    PAP through      *Typically these types of medications take time untill you see the benefit, although some may see improvement in days, often it may take weeks, especially if the medication is being titrated up to a " beneficial level. Please contact us if there are any concerns or questions regarding the medication.     Lifestyle Recommendations:  [x] SLEEP - Maintain a regular sleep schedule: Adults need at least 7-8 hours of uninterrupted a night. Maintain good sleep hygiene:  Going to bed and waking up at consistent times, avoiding excessive daytime naps, avoiding caffeinated beverages in the evening, avoid excessive stimulation in the evening and generally using bed primarily for sleeping.  One hour before bedtime would recommend turning lights down lower, decreasing your activity (may read quietly, listen to music at a low volume). When you get into bed, should eliminate all technology (no texting, emailing, playing with your phone, iPad or tablet in bed).  [x] HYDRATION - Maintain good hydration.  Drink  2L of fluid a day (4 typical small water bottles)  [x] DIET - Maintain good nutrition. In particular don't skip meals and try and eat healthy balanced meals regularly.  [x] TRIGGERS - Look for other triggers and avoid them: Limit caffeine to 1-2 cups a day or less. Avoid dietary triggers that you have noticed bring on your headaches (this could include aged cheese, peanuts, MSG, aspartame and nitrates).  [x] EXERCISE - physical exercise as we all know is good for you in many ways, and not only is good for your heart, but also is beneficial for your mental health, cognitive health and  chronic pain/headaches. I would encourage at the least 5 days of physical exercise weekly for at least 30 minutes.     Education and Follow-up  [x] Please call with any questions or concerns. Of course if any new concerning symptoms go to the emergency department.  [x] Follow up 6 months, sooner if needed

## 2025-03-24 NOTE — PROGRESS NOTES
Review of Systems   Constitutional:  Negative for appetite change and fever.   HENT: Negative.  Negative for hearing loss, tinnitus, trouble swallowing and voice change.    Eyes:  Positive for photophobia. Negative for pain.   Respiratory: Negative.  Negative for shortness of breath.    Cardiovascular: Negative.  Negative for palpitations.   Gastrointestinal:  Positive for nausea. Negative for vomiting.   Endocrine: Negative.  Negative for cold intolerance.   Genitourinary: Negative.  Negative for dysuria, frequency and urgency.   Musculoskeletal: Negative.  Negative for myalgias and neck pain.   Skin: Negative.  Negative for rash.   Neurological:  Positive for dizziness (vertigo) and headaches (low grade everyday, worsening 2-3 a week). Negative for tremors, seizures, syncope, facial asymmetry, speech difficulty, weakness, light-headedness and numbness.   Hematological: Negative.  Does not bruise/bleed easily.   Psychiatric/Behavioral:  Positive for sleep disturbance. Negative for confusion and hallucinations.    All other systems reviewed and are negative.

## 2025-03-24 NOTE — TELEPHONE ENCOUNTER
----- Message from Isabel Vines MD sent at 3/24/2025  3:21 PM EDT -----  I am sending over Sanjana although she is self-pay so we already know it is not going to be affordable, could  team please help with PAP, she has been out of work for 2 years or more with no income, living on savings. Thank you so much for your help, not sure if nursing team is needed as PA likely wont be needed but I am not sure so sending to both of you. If for some reason [employer]ovig or emgality are covered by PAP better, I would be happy to switch over, thank you

## 2025-03-24 NOTE — PROGRESS NOTES
Virtual Regular VisitName: Ania Alejandre      : 1985      MRN: [id number]  Encounter Provider: Isabel Vines MD  Encounter Date: 3/24/2025   Encounter department: NEUROLOGY [facility]    Assessment & Plan  Migraine without aura and without status migrainosus, not intractable      Assessment/Plan:   Ania Alejandre is a very pleasant  40 y.o. female with a past medical history that includes migraine, PTSD, BMI over 45, vertigo, B12 deficiency, vitamin-D deficiency, insomnia, anxiety, depression, mild degenerative disc disease C4-5, class IV Mallampati score referred here for evaluation of headache.  My initial evaluation 3/29/2022     Migraine without aura and without status migrainosus, not intractable  History of possible concussion - resolved   BUCKY not on CPAP (2023, SOPHIE 8.5, O2 down to 85%) -cannot afford to treat without insurance yet  We discussed her history of multiple possible concussions although we discussed it is also difficult to diagnose concussion definitively and how posttraumatic headache with migrainous features can often have similar presentation.  Please see EMR and HPI for details.  Most recently on 2022, she was seated on a couch when the back of the couch collapsed causing her to fall and hit the right side of her head on an end table she thinks, does not remember hitting her head, but had a red conchis at the right cheek area.  She reports she does not remember a few seconds of time which we discussed can happen as a stress reaction are related to concussion and had progressively worsening up chronic vertigo and posttraumatic headache with migrainous features.  She was evaluated emergency department where noncontrast head CT was unremarkable for acute pathology and she subsequently followed up with primary care provider who kept her out of work and asked her to follow up with Neurology.  - As of 2022:  She reports following the injury vertigo actually  got much better, she has chronic daily back on headaches for years dating back to at least 03/2020 that is worse about 1 day a week for which she is not interested in prescription preventative, did start trial of rizatriptan for abortive.  She also has history of anxiety, depression and some symptoms of posttraumatic stress with functional neurologic disorder with hyper startle response, avoidance behavior and wearing sunglasses indoors, intrusion symptoms, negative impact on cognition and mood and arousal and reactivity changes.  Also some symptoms of deconditioning and maladaptive coping.  We discussed continue follow-up with PCP and recommended establishing care with a counselor for which I will have our  see if she can help her with a list.  She was reassured by this information and we discussed gradual return to normalcy.  Referral to sleep medicine for possible sleep apnea contributing to symptoms.  - as of 5/6/2022: She reports she continues to have near daily headaches that are mild about 3/10 and worse about 2 times in the past month for which she did not yet try rizatriptan, however did go to ED today instead and sumatriptan plus IV fluids and Zofran helped.  She is still working on following up with Sleep Medicine, eye doctor, counseling.  She is now interested in prescription preventative and will start trial of amitriptyline with gradual titration up and again recommended taking rizatriptan next migraine.  - as of 8/19/2022: She reports she is doing a lot better, continues to have daily headaches that are about 3/10, but reports improvement on amitriptyline 50 mg which she started end of June, about 6 weeks ago. None more than a 5/10 in 2 months, no longer having daily migraines, so hasnt needed rizatriptan. She is continuing to re-expose herself to TV, screens etc slowly.   - as of 1/10/2023:  Follow-up with [facility] on topiramate 25 mg / 50 mg.  Continues to have PT for dizziness.   - as  of 4/10/2023: Since last visit tried and failed amitriptyline although it helped with migraine she had side effects.  She stopped topiramate since last visit once she started Aimovig.  She reports improvement since last visit on aimovig monthly  thus far after one dose with reduction in migraines. (On it through the pharmaceutical trial).  Trying to get out more which is exhausting.  Saw sleep medicine last month and home sleep study ordered out-of-pocket.  Mood overall is better and gave some more resources.   - as of 7/24/2023: She reports doing significantly better on Aimovig and the frequency has gone down to at most once a week and severity has gone down to where she finally feels functional.  Breakthrough maybe once a week she takes ibuprofen and it helps bring the pain down again.  Baseline 1 out of 10 and we discussed on retrospective review I do see some findings consistent with IIH and at any time if she would like could consider lumbar puncture and addition of acetazolamide/Diamox which she would prefer to hold off at this time although we discussed I will be certainly more forceful if she does have progressive vision changes at her next eye exam as I suspect it could be due to untreated sleep apnea contributing to the possible idiopathic intracranial hypertension although no known papilledema  - as of 12/14/2023: Unfortunately she has not followed up with eye doctor still and therefore unknown if she has papilledema and we discussed how important this is.  She reports she has been depressed due to the time of year and other circumstances including deaths in the family which kept her in bed more than usual, little better now and wanting to work with social workers again through PCP to try and obtain insurance and through asked to try and get Aimovig back on board since it was stopped once the study ended.  Currently moderate to severe headaches approximately 1-2 a month and milder headaches 2-3 a week.   Ibuprofen typically helps and she saves rizatriptan for if severe and also helps but expensive.  She is not currently interested in prescription preventative or abortive additions, although we discussed acetazolamide if needed, she worries about the tingling she had on topiramate.  She continues to not treat her sleep apnea and we discussed I absolutely recommend CPAP and again discussed morbidity and mortality if remains untreated.  - as of 3/24/2025: She reports that she did not start Aimovig after last visit as the study had ended and she could not afford to pay out-of-pocket and we discussed there are patient assistance programs that should be able to cover Emgality or Ajovy or Aimovig and I will prescribe Ajovy and ask our team to help with paperwork to see if she qualifies as she has not had income for 2 years.  We discussed if needed/approved we could also consider something like Nurtec or Ubrelvy for rescue. Right now gets a daily low level headache that is worse 2-3 times a week.  Right now she is taking rizatriptan/Maxalt which typically helps, but sometimes it does not 20% of the time and has some slight side effects.  We discussed again her untreated sleep apnea being at the root of multiple issues and she plans on treating when she has insurance/can afford to and will follow-up with sleep medicine regarding this is Dr. Carrasco also recommended CPAP.  She is overdue for eye exam and reports no recent vision changes.  We discussed we have multiple additional medications for prevention and rescue if ever needed/wanted for symptoms as well.    Workup:  - noncontrast head CT 03/04/2020: No acute intracranial abnormality.  - CTA head and neck with without contrast 11/02/2021: Normal CT angiogram of the head and neck.  - MRI brain IAC with without contrast 2/9/22: No IAC or CP angle pathology.  Unremarkable MRI of the brain.*We discussed as of my retrospective review 7/24/2023 and 12/14/2023 she has a partially  empty sella, prominent optic nerve sheath bilaterally with tortuosity bilaterally, tight ventricles, cerebellar tonsils overlie the foramen magnum with tight junction  - noncontrast head CT 03/17/2022: No acute intracranial abnormality.  - noncontrast head CT 05/06/2022: No acute intracranial abnormality.   -CTA head and neck 05/06/2022: Negative CTA head and neck for large vessel occlusion, dissection, aneurysm, or high-grade stenosis.    Preventative:  - we discussed headache hygiene and lifestyle factors that may improve headaches  -  trial of   fremanezumab-vfrm (Ajovy) 225 MG/1.5ML auto-injector; Inject once a month subcutaneously. Discussed proper use, off label use with certain meds, possible side effects and risks.  - Past/ failed/contraindicated: Amitriptyline, topiramate - tingling so much she wanted to stop, aimovig helped through trial - no longer getting since 8/2023  - future options: Alternative CGRP med like emgality, qulipta, aimovig, nurtec every other day     Abortive:  - discussed not taking over-the-counter or prescription pain medications more than 3 days per week to prevent medication overuse/rebound headache  -     rizatriptan (MAXALT) 10 MG tablet; Take 1 tablet (10 mg total) by mouth once as needed for migraine May repeat in 2 hours if needed. Max 2/24 hours, 9/month. Discussed proper use, possible side effects and risks.  - On through other providers: back up: steroids on hand if needed for back up, Ibuprofen, meclizine  - Past/ failed/contraindicated:  OTC meds  - past:  Has tolerated steroids in the past, migraine cocktails have brought pain down, sumatriptan in ED helped with IV fluids and Zofran  - future options:  prochlorperazine, Toradol IM or p.o., could consider trial for 5 days of Depakote or dexamethasone for prolonged migraine, ubrelvy, reyvow, nurtec**      Patient instructions      I still recommend following up with sleep medicine for treatment of the sleep apnea      "  Please follow-up with eye doctor/continue to follow regularly for dilated eye exam/or fundus picture and please try and have note sent to me, personally call me if they ever tell you that you have papilledema or anything else they feel is concerning and your neurologist needs to know as the notes do not always make it to me.    - \"Rewire Your Anxious Brain: How to Use the Neuroscience of Fear to End Anxiety, Panic, and Worry\" By Viraj Caba    Headache/migraine treatment:   Abortive medications (for immediate treatment of a headache):   It is ok to take ibuprofen, acetaminophen or naproxen (Advil, Tylenol,  Aleve, Excedrin) if they help your headaches you should limit these to No more than 3 times a week to avoid medication overuse/rebound headaches.     For your more moderate to severe migraines take this medication early   Maxalt (rizatriptan) 10mg tabs - take one at the onset of headache. May repeat one time after 1-2 hours if pain has not resolved.   (Max 2 a day and 9 a month)       Prescription preventive medications for headaches/migraines   (to take every day to help prevent headaches - not to take at the time of headache):  [x]    -     fremanezumab-vfrm (Ajovy) 225 MG/1.5ML auto-injector; Inject once a month subcutaneously    PAP through      *Typically these types of medications take time untill you see the benefit, although some may see improvement in days, often it may take weeks, especially if the medication is being titrated up to a beneficial level. Please contact us if there are any concerns or questions regarding the medication.     Lifestyle Recommendations:  [x] SLEEP - Maintain a regular sleep schedule: Adults need at least 7-8 hours of uninterrupted a night. Maintain good sleep hygiene:  Going to bed and waking up at consistent times, avoiding excessive daytime naps, avoiding caffeinated beverages in the evening, avoid excessive stimulation in the evening and generally using bed " primarily for sleeping.  One hour before bedtime would recommend turning lights down lower, decreasing your activity (may read quietly, listen to music at a low volume). When you get into bed, should eliminate all technology (no texting, emailing, playing with your phone, iPad or tablet in bed).  [x] HYDRATION - Maintain good hydration.  Drink  2L of fluid a day (4 typical small water bottles)  [x] DIET - Maintain good nutrition. In particular don't skip meals and try and eat healthy balanced meals regularly.  [x] TRIGGERS - Look for other triggers and avoid them: Limit caffeine to 1-2 cups a day or less. Avoid dietary triggers that you have noticed bring on your headaches (this could include aged cheese, peanuts, MSG, aspartame and nitrates).  [x] EXERCISE - physical exercise as we all know is good for you in many ways, and not only is good for your heart, but also is beneficial for your mental health, cognitive health and  chronic pain/headaches. I would encourage at the least 5 days of physical exercise weekly for at least 30 minutes.     Education and Follow-up  [x] Please call with any questions or concerns. Of course if any new concerning symptoms go to the emergency department.  [x] Follow up 6 months - only due to self pay for your sake, I am here sooner if needed     Orders:    fremanezumab-vfrm (Ajovy) 225 MG/1.5ML auto-injector; Inject once a month subcutaneously    rizatriptan (MAXALT) 10 mg tablet; Take 1 tablet (10 mg total) by mouth once as needed for migraine May repeat in 2 hours if needed. Max 2/24 hours, 9/month.    BUCKY (obstructive sleep apnea)         Migraine without aura and without status migrainosus, not intractable         BUCKY (obstructive sleep apnea)             History of Present Illness         CC:   We had the pleasure of evaluating Ania Alejandre in neurological consultation today. Ania Alejandre presents today for evaluation of headaches.   History obtained from patient as well as  available medical record review.  History of Present Illness:   Interval history as of 3/24/2025  - Of note/managed by others: (Had to copy of her interval history as last note was not started on a virtual visit format)  - She was to follow-up in 3 to 4 months after last visit in December 2023 and is now following up 1 year and 4 months later, please see EMR for details since last visit which include following up with PCP  - has been working with advocates through [facility] to try and get insurance  - out of work since 2 years, not driving, wanting [place]   - no significant new or concerning neurologic symptoms since last visit reported  - last eye exam: over a year, knows she is due, no vision changes     - sleep: - BUCKY not on CPAP (6/4/2023, SOPHIE 8.5, O2 down to 85%) -cannot afford to treat without insurance yet  Starts on back and then goes the side  Last saw sleep medicine 8/14/2020    Headaches and migraines   Ania reports that has been pushing herself a little more to be social and therefore getting more headaches.  (Did 3 sessions in 4 days of bello and dragons - 10-11 hours of socializing)  Triggered last Tuesday, better worse again yesterday with vertigo     She has an everyday low level headache and it gets worse about 2-3 times a week.  Rizatriptan and Ibuprofen work about 80% of the time, and she needs a refill of the rizatriptan.       Preventative:   -  aimovig monthly-not had since 8/2023    Abortive:   Rizatriptan - 80% of the time helps, 9/10 as wears off feels a little weird    No reported bothersome side effects      Please see previous notes/EMR for details from previous visits.     Objective   There were no vitals taken for this visit.      Objective:     Exam limited by Video  Physical Exam:                                                                 Vitals:            Constitutional:    There were no vitals taken for this visit.  BP Readings from Last 3 Encounters:   12/12/24 128/70   06/07/24  140/80   12/14/23 169/88     Pulse Readings from Last 3 Encounters:   12/12/24 (!) 111   06/07/24 78   12/14/23 88         Well developed, well nourished, No dysmorphic features.         Normocephalic atraumatic.     Normal behavior and appropriate affect        Able to answer questions appropriately, provide history of recent events   Normal language and spontaneous speech.  facial expression symmetric      Administrative Statements   Encounter provider Isabel Vines MD    The Patient is located at Home and in the following state in which I hold an active license [place].    The patient was identified by name and date of birth. Ania Alejandre was informed that this is a telemedicine visit and that the visit is being conducted through the Epic Embedded platform. She agrees to proceed..  My office door was closed. No one else was in the room.  She acknowledged consent and understanding of privacy and security of the video platform. The patient has agreed to participate and understands they can discontinue the visit at any time.    I have spent a total time of 30 minutes in caring for this patient on the day of the visit/encounter including Prognosis, Risks and benefits of tx options, Instructions for management, Patient education, Importance of tx compliance, Risk factor reductions, Impressions, Counseling / Coordination of care, Documenting in the medical record, Obtaining or reviewing history  , and Communicating with other healthcare professionals, not including the time spent for establishing the audio/video connection.

## 2025-03-24 NOTE — ASSESSMENT & PLAN NOTE
Assessment/Plan:   Ania Alejandre is a very pleasant  40 y.o. female with a past medical history that includes migraine, PTSD, BMI over 45, vertigo, B12 deficiency, vitamin-D deficiency, insomnia, anxiety, depression, mild degenerative disc disease C4-5, class IV Mallampati score referred here for evaluation of headache.  My initial evaluation 3/29/2022     Migraine without aura and without status migrainosus, not intractable  History of possible concussion - resolved   BUCKY not on CPAP (6/4/2023, SOHPIE 8.5, O2 down to 85%) -cannot afford to treat without insurance yet  We discussed her history of multiple possible concussions although we discussed it is also difficult to diagnose concussion definitively and how posttraumatic headache with migrainous features can often have similar presentation.  Please see EMR and HPI for details.  Most recently on 03/17/2022, she was seated on a couch when the back of the couch collapsed causing her to fall and hit the right side of her head on an end table she thinks, does not remember hitting her head, but had a red conchis at the right cheek area.  She reports she does not remember a few seconds of time which we discussed can happen as a stress reaction are related to concussion and had progressively worsening up chronic vertigo and posttraumatic headache with migrainous features.  She was evaluated emergency department where noncontrast head CT was unremarkable for acute pathology and she subsequently followed up with primary care provider who kept her out of work and asked her to follow up with Neurology.  - As of 03/29/2022:  She reports following the injury vertigo actually got much better, she has chronic daily back on headaches for years dating back to at least 03/2020 that is worse about 1 day a week for which she is not interested in prescription preventative, did start trial of rizatriptan for abortive.  She also has history of anxiety, depression and some symptoms of  posttraumatic stress with functional neurologic disorder with hyper startle response, avoidance behavior and wearing sunglasses indoors, intrusion symptoms, negative impact on cognition and mood and arousal and reactivity changes.  Also some symptoms of deconditioning and maladaptive coping.  We discussed continue follow-up with PCP and recommended establishing care with a counselor for which I will have our  see if she can help her with a list.  She was reassured by this information and we discussed gradual return to normalcy.  Referral to sleep medicine for possible sleep apnea contributing to symptoms.  - as of 5/6/2022: She reports she continues to have near daily headaches that are mild about 3/10 and worse about 2 times in the past month for which she did not yet try rizatriptan, however did go to ED today instead and sumatriptan plus IV fluids and Zofran helped.  She is still working on following up with Sleep Medicine, eye doctor, counseling.  She is now interested in prescription preventative and will start trial of amitriptyline with gradual titration up and again recommended taking rizatriptan next migraine.  - as of 8/19/2022: She reports she is doing a lot better, continues to have daily headaches that are about 3/10, but reports improvement on amitriptyline 50 mg which she started end of June, about 6 weeks ago. None more than a 5/10 in 2 months, no longer having daily migraines, so hasnt needed rizatriptan. She is continuing to re-expose herself to TV, screens etc slowly.   - as of 1/10/2023:  Follow-up with [facility] on topiramate 25 mg / 50 mg.  Continues to have PT for dizziness.   - as of 4/10/2023: Since last visit tried and failed amitriptyline although it helped with migraine she had side effects.  She stopped topiramate since last visit once she started Aimovig.  She reports improvement since last visit on aimovig monthly  thus far after one dose with reduction in migraines. (On it  through the pharmaceutical trial).  Trying to get out more which is exhausting.  Saw sleep medicine last month and home sleep study ordered out-of-pocket.  Mood overall is better and gave some more resources.   - as of 7/24/2023: She reports doing significantly better on Aimovig and the frequency has gone down to at most once a week and severity has gone down to where she finally feels functional.  Breakthrough maybe once a week she takes ibuprofen and it helps bring the pain down again.  Baseline 1 out of 10 and we discussed on retrospective review I do see some findings consistent with IIH and at any time if she would like could consider lumbar puncture and addition of acetazolamide/Diamox which she would prefer to hold off at this time although we discussed I will be certainly more forceful if she does have progressive vision changes at her next eye exam as I suspect it could be due to untreated sleep apnea contributing to the possible idiopathic intracranial hypertension although no known papilledema  - as of 12/14/2023: Unfortunately she has not followed up with eye doctor still and therefore unknown if she has papilledema and we discussed how important this is.  She reports she has been depressed due to the time of year and other circumstances including deaths in the family which kept her in bed more than usual, little better now and wanting to work with social workers again through PCP to try and obtain insurance and through asked to try and get Aimovig back on board since it was stopped once the study ended.  Currently moderate to severe headaches approximately 1-2 a month and milder headaches 2-3 a week.  Ibuprofen typically helps and she saves rizatriptan for if severe and also helps but expensive.  She is not currently interested in prescription preventative or abortive additions, although we discussed acetazolamide if needed, she worries about the tingling she had on topiramate.  She continues to not  treat her sleep apnea and we discussed I absolutely recommend CPAP and again discussed morbidity and mortality if remains untreated.  - as of 3/24/2025: She reports that she did not start Aimovig after last visit as the study had ended and she could not afford to pay out-of-pocket and we discussed there are patient assistance programs that should be able to cover Emgality or Ajovy or Aimovig and I will prescribe Ajovy and ask our team to help with paperwork to see if she qualifies as she has not had income for 2 years.  We discussed if needed/approved we could also consider something like Nurtec or Ubrelvy for rescue. Right now gets a daily low level headache that is worse 2-3 times a week.  Right now she is taking rizatriptan/Maxalt which typically helps, but sometimes it does not 20% of the time and has some slight side effects.  We discussed again her untreated sleep apnea being at the root of multiple issues and she plans on treating when she has insurance/can afford to and will follow-up with sleep medicine regarding this is Dr. Carrasco also recommended CPAP.  She is overdue for eye exam and reports no recent vision changes.  We discussed we have multiple additional medications for prevention and rescue if ever needed/wanted for symptoms as well.    Workup:  - noncontrast head CT 03/04/2020: No acute intracranial abnormality.  - CTA head and neck with without contrast 11/02/2021: Normal CT angiogram of the head and neck.  - MRI brain IAC with without contrast 2/9/22: No IAC or CP angle pathology.  Unremarkable MRI of the brain.*We discussed as of my retrospective review 7/24/2023 and 12/14/2023 she has a partially empty sella, prominent optic nerve sheath bilaterally with tortuosity bilaterally, tight ventricles, cerebellar tonsils overlie the foramen magnum with tight junction  - noncontrast head CT 03/17/2022: No acute intracranial abnormality.  - noncontrast head CT 05/06/2022: No acute intracranial  abnormality.   -CTA head and neck 05/06/2022: Negative CTA head and neck for large vessel occlusion, dissection, aneurysm, or high-grade stenosis.    Preventative:  - we discussed headache hygiene and lifestyle factors that may improve headaches  -  trial of   fremanezumab-vfrm (Ajovy) 225 MG/1.5ML auto-injector; Inject once a month subcutaneously. Discussed proper use, off label use with certain meds, possible side effects and risks.  - Past/ failed/contraindicated: Amitriptyline, topiramate - tingling so much she wanted to stop, aimovig helped through trial - no longer getting since 8/2023  - future options: Alternative CGRP med like emgality, qulipta, aimovig, nurtec every other day     Abortive:  - discussed not taking over-the-counter or prescription pain medications more than 3 days per week to prevent medication overuse/rebound headache  -     rizatriptan (MAXALT) 10 MG tablet; Take 1 tablet (10 mg total) by mouth once as needed for migraine May repeat in 2 hours if needed. Max 2/24 hours, 9/month. Discussed proper use, possible side effects and risks.  - On through other providers: back up: steroids on hand if needed for back up, Ibuprofen, meclizine  - Past/ failed/contraindicated:  OTC meds  - past:  Has tolerated steroids in the past, migraine cocktails have brought pain down, sumatriptan in ED helped with IV fluids and Zofran  - future options:  prochlorperazine, Toradol IM or p.o., could consider trial for 5 days of Depakote or dexamethasone for prolonged migraine, ubrelvy, reyvow, nurtec**      Patient instructions      I still recommend following up with sleep medicine for treatment of the sleep apnea       Please follow-up with eye doctor/continue to follow regularly for dilated eye exam/or fundus picture and please try and have note sent to me, personally call me if they ever tell you that you have papilledema or anything else they feel is concerning and your neurologist needs to know as the notes  "do not always make it to me.    - \"Rewire Your Anxious Brain: How to Use the Neuroscience of Fear to End Anxiety, Panic, and Worry\" By Viraj PhD    Headache/migraine treatment:   Abortive medications (for immediate treatment of a headache):   It is ok to take ibuprofen, acetaminophen or naproxen (Advil, Tylenol,  Aleve, Excedrin) if they help your headaches you should limit these to No more than 3 times a week to avoid medication overuse/rebound headaches.     For your more moderate to severe migraines take this medication early   Maxalt (rizatriptan) 10mg tabs - take one at the onset of headache. May repeat one time after 1-2 hours if pain has not resolved.   (Max 2 a day and 9 a month)       Prescription preventive medications for headaches/migraines   (to take every day to help prevent headaches - not to take at the time of headache):  [x]    -     fremanezumab-vfrm (Ajovy) 225 MG/1.5ML auto-injector; Inject once a month subcutaneously    PAP through      *Typically these types of medications take time untill you see the benefit, although some may see improvement in days, often it may take weeks, especially if the medication is being titrated up to a beneficial level. Please contact us if there are any concerns or questions regarding the medication.     Lifestyle Recommendations:  [x] SLEEP - Maintain a regular sleep schedule: Adults need at least 7-8 hours of uninterrupted a night. Maintain good sleep hygiene:  Going to bed and waking up at consistent times, avoiding excessive daytime naps, avoiding caffeinated beverages in the evening, avoid excessive stimulation in the evening and generally using bed primarily for sleeping.  One hour before bedtime would recommend turning lights down lower, decreasing your activity (may read quietly, listen to music at a low volume). When you get into bed, should eliminate all technology (no texting, emailing, playing with your phone, iPad or tablet in " bed).  [x] HYDRATION - Maintain good hydration.  Drink  2L of fluid a day (4 typical small water bottles)  [x] DIET - Maintain good nutrition. In particular don't skip meals and try and eat healthy balanced meals regularly.  [x] TRIGGERS - Look for other triggers and avoid them: Limit caffeine to 1-2 cups a day or less. Avoid dietary triggers that you have noticed bring on your headaches (this could include aged cheese, peanuts, MSG, aspartame and nitrates).  [x] EXERCISE - physical exercise as we all know is good for you in many ways, and not only is good for your heart, but also is beneficial for your mental health, cognitive health and  chronic pain/headaches. I would encourage at the least 5 days of physical exercise weekly for at least 30 minutes.     Education and Follow-up  [x] Please call with any questions or concerns. Of course if any new concerning symptoms go to the emergency department.  [x] Follow up 6 months - only due to self pay for your sake, I am here sooner if needed     Orders:    fremanezumab-vfrm (Ajovy) 225 MG/1.5ML auto-injector; Inject once a month subcutaneously    rizatriptan (MAXALT) 10 mg tablet; Take 1 tablet (10 mg total) by mouth once as needed for migraine May repeat in 2 hours if needed. Max 2/24 hours, 9/month.

## 2025-03-25 ENCOUNTER — TELEPHONE (OUTPATIENT)
Age: 40
End: 2025-03-25

## 2025-03-25 NOTE — TELEPHONE ENCOUNTER
MSW attempted to reach patient at [phone] to offer the Ajovy PAP offered by [employer] and review income guidelines for same (300% of the FPL). No answer. MSW left a message requesting callback. Awaiting same.

## 2025-03-26 NOTE — TELEPHONE ENCOUNTER
MSW attempted to reach patient at [phone] to offer the Ajovy PAP offered by [employer] and review income guidelines for same (300% of the FPL). No answer. MSW left a message requesting callback. Awaiting same.

## 2025-03-27 NOTE — TELEPHONE ENCOUNTER
"MSW attempted to reach patient at [phone] to offer the Ajovy PAP offered by [employer] and review income guidelines for same (300% of the FPL). No answer. MSW left a message requesting callback. Awaiting same.     Since there have been 3 unsuccessful attempts to reach patient, MSW sent an \"Unable to Reach\" letter via [employer] this date. If same is not read within a timely manner, MSW will mail a copy of the letter to patient's home address.     "

## 2025-03-27 NOTE — TELEPHONE ENCOUNTER
MSW received completed provider section of Ajovy PAP gregory.     MSW will await patient section and zero income letter.

## 2025-03-27 NOTE — TELEPHONE ENCOUNTER
Provider portion of the Ajovy PAP gregory was sent to EVERARDO Abbott, who will get same to Dr. Vines for completion/signature.     Patient called back ([phone]). MSW reviewed income guidelines and patient reports that she is under same. Patient will complete the patient section of the Ajovy PAP gregory (updated PAP application sent to patient). Patient will complete/return the application to this writer via MyChart ASAP along with a zero income letter.    Once both sections are completed, MSW will fax application and supporting documents to [facility] for processing.

## 2025-03-27 NOTE — TELEPHONE ENCOUNTER
Provider section of the Ajovy PAP application initiated and will be sent to Dr. Vines for completion.

## 2025-03-28 NOTE — TELEPHONE ENCOUNTER
"MSW received patient's section of the Ajovy PAP application this date via [employer] and also received patient's zero income letter via email.     MSW noted that patient \"signed\" the application electronically using the same font that she used to complete the application. MSW phoned [employer] at [phone] and spoke with Renata. She stated that when they receive the application, it will initially go into a \"missing information\" queue but patient will be notified by phone call. If they have patient on the phone, they can confirm her email address and can send a link to \"My EZ Consent\" which patient can go onto to provide their consent. Renata stated that they can accept an electronic signature only via this portal or their online application.      MSW faxed completed application, med/allergy list, and zero income letter to [employer] at [phone]. Successful fax notice received.    MSW phoned patient back at [phone] to advise that [employer] will be reaching out to her to obtain her electronic signature once they receive/process her application. Patient will be alert to calls from [employer].     MSW will follow.   "

## 2025-03-31 NOTE — TELEPHONE ENCOUNTER
"MSW phoned [email or web address] at [phone] and spoke with Renata. She confirmed that they received the faxed. Per Renata stated application processing times can be 7-10 business days, but that she did send a message to the team to be begin processing the application which may reduce the application processing times. Renata suggested a callback on 4/4. Renata confirmed that they will need to reach out to her to confirm email address to send link for \"My EZ Consent\".    MSW phoned patient at [phone] to provide above update. No answer. MSW left a detailed message with above information. MSW directed patient to be alert to call from [email or web address] about the need for the electronic signature. MSW did provide this writer's name/number should patient have any questions/concerns for this writer in the interim.    MSW will update patient as able after 4/4/25.  "

## 2025-04-04 NOTE — TELEPHONE ENCOUNTER
MSW phoned [facility] at [phone] and spoke with Jett. He stated that they are missing proof of income for the patient. MSW advised that patient submitted a zero income letter which was faxed with the rest of the application. Jett stated that the provider needs to send an attestation letter regarding patient's lack of income/lack of insurance and it must be signed/returned to [facility] for continued processing.     Attestation letter drafted. Dr. Vines is out of the office this date and next week.     Letter will need to be signed by Dr. Vines as she is familiar with patient's situation.    MSW alerted patient that attestation letter needs to be signed by Dr. Vines and she is not due back in the office until 4/14.

## 2025-04-07 NOTE — TELEPHONE ENCOUNTER
MSW spoke with EVERARDO Fonseca is not willing to sign attestation letter but she will see if any other [place] provider is agreeable. MSW will follow.

## 2025-04-07 NOTE — TELEPHONE ENCOUNTER
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

## 2025-04-15 NOTE — TELEPHONE ENCOUNTER
LATE ENTRY FROM 4/14/25:    MSW reached out to EVERARDO Fonseca, but she was out of the office. Form to be signed on 4/15/25.

## 2025-04-16 NOTE — TELEPHONE ENCOUNTER
MSW received signed attestation letter back from Dr. Vines this date.    MSW faxed signed attestation letter to [facility] at [phone]. Successful fax notice received.     MSW phoned patient at [phone]. No answer. MSW left a message with above info.    MSW will follow-up with [facility] Cam[facility] on 4/17.

## 2025-04-17 NOTE — TELEPHONE ENCOUNTER
[facility] phoned [facility] at [phone] and spoke with Raymundo. She was able to locate the faxed attestation letter from 4/16/25 and stated that she was going to work to process the application. Raymundo stated that once she is done, she does need to forward the application to another person but they are normally quick to make the final determination. Raymundo stated that she will follow the application and should be able to call this writer with a determination on 4/18.     MSW phoned patient at [phone]. No answer. MSW left a detailed message with above info.     MSW will await callback from [facility] and will update patient as able.

## 2025-04-18 NOTE — TELEPHONE ENCOUNTER
MSW phoned [facility] at [phone] and spoke with Ghazala RIGGS - she stated that the application is in progress and that they have all they need (they did receive the signed attestation from our office).     YSABEL will follow.

## 2025-04-18 NOTE — TELEPHONE ENCOUNTER
"MSW retrieved the following message from Raymundo at [facility]:    \"Lynne Humphries, this is Stormi. I was just giving you a call that I said I would. I just wanted to try to reach you before you left on your half day, but I probably missed you and I do apologize for that. I haven't heard back regarding the final review that is being done of my work, but I did complete what I said I would and I did submit Ania Mortensen application for approval. So I didn't see any problem with it except that I did pull up this large amount for income and I just, I said I think it's probably previous or she previously lived or her sister's income. But I just, we have two attestations on file, we should be able to move forward. So hopefully they will say yes, but I don't think we're gonna have anything before Monday. So in any case, I'm working on it and I hope that you have a wonderful weekend. OK, bye.\"    MSW will follow.    "

## 2025-04-22 NOTE — TELEPHONE ENCOUNTER
YSABEL received a callback from patient ([phone]). She stated that she was in contact with the [facility] and that the shipment of Ajovy should be delivered on 4/23. Patient will notify this writer once she receives the package.    Patient is aware that her current application is approved from 4/21/25-4/21/26. Patient made aware that she will need to reapply if she needs continued assistance after 4/21/26. Patient is aware that if she receives insurance in the meantime, that we will need to try to secure the Ajovy from her insurance. Patient verbalized understanding.

## 2025-04-24 NOTE — TELEPHONE ENCOUNTER
MSW attempted to reach patient this date at [phone] to follow-up to see if she received the Ajovy shipment. No answer. MSW left a message requesting callback. Awaiting same.

## 2025-04-25 NOTE — TELEPHONE ENCOUNTER
"MSW retrieved the following message from patient this date:    \"Hi, Lynne, it's Ania Alejandre returning your call. I did receive my medication yesterday. I apologize I didn't call you back. While I was trying to bring it inside, I wound up smacking my head against the door frame. So I spent pretty much all of yesterday in bed In today's it may have to be the same, but I did want to let you know I received it. There's no need to call me back unless you need some information. I will. Just as a disclaimer, my phone is muted and I will probably miss your call, but I will get back to you as soon as I'm able. Thank you and I hope you have a great weekend. Bye bye.\"    The Social Work Team will be available to patient as needed.     "

## 2025-07-21 ENCOUNTER — OFFICE VISIT (OUTPATIENT)
Dept: FAMILY MEDICINE CLINIC | Facility: CLINIC | Age: 40
End: 2025-07-21

## 2025-07-21 ENCOUNTER — APPOINTMENT (OUTPATIENT)
Dept: RADIOLOGY | Facility: CLINIC | Age: 40
End: 2025-07-21
Attending: FAMILY MEDICINE

## 2025-07-21 VITALS
OXYGEN SATURATION: 97 % | HEART RATE: 101 BPM | DIASTOLIC BLOOD PRESSURE: 78 MMHG | HEIGHT: 63 IN | SYSTOLIC BLOOD PRESSURE: 126 MMHG | WEIGHT: 267.2 LBS | BODY MASS INDEX: 47.34 KG/M2

## 2025-07-21 DIAGNOSIS — R29.6 FALLS: ICD-10-CM

## 2025-07-21 DIAGNOSIS — S82.892A CLOSED FRACTURE OF LEFT ANKLE, INITIAL ENCOUNTER: ICD-10-CM

## 2025-07-21 DIAGNOSIS — S92.505A CLOSED NONDISPLACED FRACTURE OF PHALANX OF LESSER TOE OF LEFT FOOT, UNSPECIFIED PHALANX, INITIAL ENCOUNTER: ICD-10-CM

## 2025-07-21 DIAGNOSIS — S92.505A CLOSED NONDISPLACED FRACTURE OF PHALANX OF LESSER TOE OF LEFT FOOT, UNSPECIFIED PHALANX, INITIAL ENCOUNTER: Primary | ICD-10-CM

## 2025-07-21 PROBLEM — S92.502A: Status: ACTIVE | Noted: 2025-07-21

## 2025-07-21 PROCEDURE — 73610 X-RAY EXAM OF ANKLE: CPT

## 2025-07-21 PROCEDURE — 73630 X-RAY EXAM OF FOOT: CPT

## 2025-07-21 PROCEDURE — 99214 OFFICE O/P EST MOD 30 MIN: CPT | Performed by: FAMILY MEDICINE

## 2025-07-21 NOTE — ASSESSMENT & PLAN NOTE
Patient did have a fall prior to the injuries as noted.  Encouraged her to get up slowly from seated, wait for a little bit before moving (likely most important).  Encourage hydration.

## 2025-07-21 NOTE — PROGRESS NOTES
Name: Ania Alejandre      : 1985      MRN: [id number]  Encounter Provider: Braxton Johnson MD  Encounter Date: 2025   Encounter department: [facility]  :  Assessment & Plan  Closed nondisplaced fracture of phalanx of lesser toe of left foot, unspecified phalanx, initial encounter  Possible fracture of the 3rd, 4th and 5th toes.  Point tenderness over all of these.  X-ray foot.  There is also some tenderness at the 4th and 5th metatarsals.  Again, x-ray of the foot.  Orders:  •  XR foot 3+ vw left; Future  •  XR ankle 3+ vw left; Future    Closed fracture of left ankle, initial encounter  Some tenderness at the inferior aspect of the lateral malleolus on the left.  This brings up the possibility of fracture at the ankle, though again I doubt this.  X-ray to confirm.  Orders:  •  XR foot 3+ vw left; Future  •  XR ankle 3+ vw left; Future    Falls  Patient did have a fall prior to the injuries as noted.  Encouraged her to get up slowly from seated, wait for a little bit before moving (likely most important).  Encourage hydration.             1. Closed nondisplaced fracture of phalanx of lesser toe of left foot, unspecified phalanx, initial encounter  Assessment & Plan:  Possible fracture of the 3rd, 4th and 5th toes.  Point tenderness over all of these.  X-ray foot.  There is also some tenderness at the 4th and 5th metatarsals.  Again, x-ray of the foot.  Orders:  •  XR foot 3+ vw left; Future  •  XR ankle 3+ vw left; Future    Orders:  -     XR foot 3+ vw left; Future; Expected date: 2025  -     XR ankle 3+ vw left; Future; Expected date: 2025  2. Closed fracture of left ankle, initial encounter  Assessment & Plan:  Some tenderness at the inferior aspect of the lateral malleolus on the left.  This brings up the possibility of fracture at the ankle, though again I doubt this.  X-ray to confirm.  Orders:  •  XR foot 3+ vw left; Future  •  XR ankle 3+ vw left;  "Future    Orders:  -     XR foot 3+ vw left; Future; Expected date: 07/21/2025  -     XR ankle 3+ vw left; Future; Expected date: 07/21/2025  3. Falls  Assessment & Plan:  Patient did have a fall prior to the injuries as noted.  Encouraged her to get up slowly from seated, wait for a little bit before moving (likely most important).  Encourage hydration.           Chief Complaint   Patient presents with   • Fall     Patient fell yesterday and hurt left toe and ankle             History of Present Illness   Patient fell yesterday.  Was after standing up quickly, but does not remember how.  Left leg twisted, and irritated.  Ankle feels irrritated.  Bruises all over.    Reports she did not hit her head.    Significant amount of tenderness at the toes and ankle.        Review of Systems   Constitutional: Negative.    HENT: Negative.     Respiratory: Negative.     Musculoskeletal:         Irritation per HPI   Skin:         Ecchymosis       Objective   /78 (BP Location: Right arm, Patient Position: Sitting, Cuff Size: Standard)   Pulse 101   Ht 5' 3\" (1.6 m)   Wt 121 kg (267 lb 3.2 oz)   SpO2 97%   BMI 47.33 kg/m²      Physical Exam  Vitals and nursing note reviewed.   Constitutional:       Appearance: Normal appearance. She is well-developed.   HENT:      Head: Normocephalic and atraumatic.     Cardiovascular:      Rate and Rhythm: Normal rate and regular rhythm.      Pulses:           Carotid pulses are 2+ on the right side and 2+ on the left side.     Heart sounds: Normal heart sounds.   Pulmonary:      Effort: Pulmonary effort is normal.      Breath sounds: Normal breath sounds. No wheezing or rales.   Chest:      Chest wall: No tenderness.     Musculoskeletal:        Back:         Feet:      Skin:         Neurological:      Mental Status: She is alert.         "

## 2025-07-21 NOTE — ASSESSMENT & PLAN NOTE
Possible fracture of the 3rd, 4th and 5th toes.  Point tenderness over all of these.  X-ray foot.  There is also some tenderness at the 4th and 5th metatarsals.  Again, x-ray of the foot.  Orders:  •  XR foot 3+ vw left; Future  •  XR ankle 3+ vw left; Future

## 2025-07-21 NOTE — ASSESSMENT & PLAN NOTE
Some tenderness at the inferior aspect of the lateral malleolus on the left.  This brings up the possibility of fracture at the ankle, though again I doubt this.  X-ray to confirm.  Orders:  •  XR foot 3+ vw left; Future  •  XR ankle 3+ vw left; Future

## 2025-07-21 NOTE — PATIENT INSTRUCTIONS
1. Closed nondisplaced fracture of phalanx of lesser toe of left foot, unspecified phalanx, initial encounter  Assessment & Plan:  Possible fracture of the 3rd, 4th and 5th toes.  Point tenderness over all of these.  X-ray foot.  There is also some tenderness at the 4th and 5th metatarsals.  Again, x-ray of the foot.  Orders:    XR foot 3+ vw left; Future    XR ankle 3+ vw left; Future    Orders:  -     XR foot 3+ vw left; Future; Expected date: 07/21/2025  -     XR ankle 3+ vw left; Future; Expected date: 07/21/2025  2. Closed fracture of left ankle, initial encounter  Assessment & Plan:  Some tenderness at the inferior aspect of the lateral malleolus on the left.  This brings up the possibility of fracture at the ankle, though again I doubt this.  X-ray to confirm.  Orders:    XR foot 3+ vw left; Future    XR ankle 3+ vw left; Future    Orders:  -     XR foot 3+ vw left; Future; Expected date: 07/21/2025  -     XR ankle 3+ vw left; Future; Expected date: 07/21/2025  3. Falls  Assessment & Plan:  Patient did have a fall prior to the injuries as noted.  Encouraged her to get up slowly from seated, wait for a little bit before moving (likely most important).  Encourage hydration.           COVID 19 Instructions    Ania Alejandre was advised to limit contact with others to essential tasks such as getting food, medications, and medical care.    Proper handwashing reviewed, and Hand sanitzer when washing is not available.    If the patient develops symptoms of COVID 19, the patient should call the office as soon as possible.    It is strongly recommended that Flu Vaccinations be obtained.      Virtual Visits:  You should get a text message when the provider is ready to see you.  Click on the link in the text message, and the call should start.  Make sure you allow camera and microphone access.  This is HIPPA compliant, and secure.  Also, you could access this visit from [employer] in the [email or web address] gregory.      If you have  not already done so, get immunized to COVID 19.      We are committed to getting you vaccinated as soon as possible and will be closely following [facility] and [facility] guidelines as they are released and revised.  Please refer to our COVID-19 vaccine webpage for the most up to date information on the vaccine and our distribution efforts.    This site will also have the most up to date recommendations for COVID booster vaccine.    [email or web address]    Call [phone] ([phone]), option [phone]    You can also visit https://www.vaccines.gov/ to find vaccines in your area.    OUR LOCATION:    [facility]  [address] [place] [address], [address]  [place], [place], [address] [phone]  Fax: [phone]    Lab services, Rheumatology, and OB/GYN are at this location as well.

## 2025-07-23 ENCOUNTER — TELEPHONE (OUTPATIENT)
Age: 40
End: 2025-07-23

## 2025-07-23 NOTE — TELEPHONE ENCOUNTER
Received call from patient regarding xray results - advised stil in process   Once resulted provider will be in touch